# Patient Record
Sex: MALE | Race: BLACK OR AFRICAN AMERICAN | Employment: UNEMPLOYED | URBAN - METROPOLITAN AREA
[De-identification: names, ages, dates, MRNs, and addresses within clinical notes are randomized per-mention and may not be internally consistent; named-entity substitution may affect disease eponyms.]

---

## 2017-01-25 ENCOUNTER — OFFICE VISIT (OUTPATIENT)
Dept: CARDIOLOGY CLINIC | Age: 52
End: 2017-01-25

## 2017-01-25 VITALS
HEIGHT: 71 IN | DIASTOLIC BLOOD PRESSURE: 100 MMHG | SYSTOLIC BLOOD PRESSURE: 165 MMHG | WEIGHT: 173 LBS | HEART RATE: 100 BPM | BODY MASS INDEX: 24.22 KG/M2

## 2017-01-25 DIAGNOSIS — R06.02 SHORT OF BREATH ON EXERTION: Primary | ICD-10-CM

## 2017-01-25 DIAGNOSIS — M51.36 DDD (DEGENERATIVE DISC DISEASE), LUMBAR: ICD-10-CM

## 2017-01-25 DIAGNOSIS — I10 ESSENTIAL HYPERTENSION: ICD-10-CM

## 2017-01-25 DIAGNOSIS — D86.9 SARCOID: ICD-10-CM

## 2017-01-25 DIAGNOSIS — I42.9 CARDIOMYOPATHY (HCC): ICD-10-CM

## 2017-01-25 RX ORDER — LOSARTAN POTASSIUM 25 MG/1
TABLET ORAL DAILY
COMMUNITY
End: 2017-01-25 | Stop reason: SDUPTHER

## 2017-01-25 RX ORDER — LOSARTAN POTASSIUM 25 MG/1
25 TABLET ORAL DAILY
Qty: 30 TAB | Refills: 11 | Status: SHIPPED | OUTPATIENT
Start: 2017-01-25 | End: 2017-05-30 | Stop reason: SDUPTHER

## 2017-01-25 NOTE — LETTER
1/25/2017 11:52 PM 
Patient:  Mitchell Adams YOB: 1955 Date of Visit: 1/25/2017 Dear MD Dragan Jade [facility] [address] [facility] 7 [address] VIA In Basket Sandy Dye MD 
[address] [facility] [address] [facility] 7 [address] VIA Facsimile: [phone] 
 : 
Mr. Andres Gonzalez is a 62 yo M with a history of pulmonary sarcoid, alcohol abuse (quit in December 2015), bipolar, had an admission for abdominal pain and CHF in December 2015, found to have an EF of 10-15%; was felt to be alcohol related. Also had cholecystitis and a drain in place, later 3/2016 lap choly with Dr. Nick Sanchez. Echo 2/2016 noted EF improved to 40%. Echo 5/2016 EF had decreased to 20%. Cardiac cath 05/24/2016 noted no coronary artery disease. EF 9/2016 EF normalized to 55%. Sees PCP for herniated disc with sciatica. He had been doing okay, but the last three days he has had a headache. He does admit to having a lot of stressors and a sister that is missing. He has been short of breath at times when he is walking stairs. He denies any exertional chest pains, but will have occasional brief chest discomfort at rest.  He has continued to avoid tobacco and alcohol use. He did gain weight after being off alcohol and tobacco and Percocet over the holidays and we talked a little bit about this. He was on Lisinopril before, but had a dry mouth and this was stopped. He said there was some discussion about putting him on Clonidine when he sees his primary care physician. He is compensated on exam with clear lungs. Assessment and Plan: 1. Shortness of breath. Given his history of cardiomyopathy, will obtain an echocardiogram for further evaluation. He overall seems euvolemic and compensated here. His last echocardiogram noted an EF of 55%. Cardiac cath 5/2016 that noted no CAD. His EF had been as low as 25% in 5/2016. Will continue his beta-blocker. Instead of Clonidine, he may do better on ARB and gave him a prescription for Losartan 25 mg. He will see his primary care physician next week and discuss this further. Will tentatively have him follow up in four to five months if his echocardiogram is okay. 2. Essential hypertension. His blood pressure had been normal, but the last few days it has been higher. It is likely secondary to his headaches. Adding Losartan 25 mg as noted above. 3. Chest pains. Noncardiac. Possibly musculoskeletal.  Cardiac catheterization 2016 noted no CAD. 4. Chronic back pains. Followed by Dr. Carrol Rosales. 5. Pulmonary sarcoid. Followed by [facility]. 6. History of alcohol abuse and tobacco use. Quit in 12/2015 and 03/2016. If you have questions, please do not hesitate to call me. Sincerely, Parveen Sinclair MD

## 2017-01-25 NOTE — PROGRESS NOTES
[place]: Vicki Rodriguez  [phone]    History of Present Illness:   Mr. Rowena Heredia is a 64 yo M with a history of pulmonary sarcoid, alcohol abuse (quit in December 2015), bipolar, had an admission for abdominal pain and CHF in December 2015, found to have an EF of 10-15%; was felt to be alcohol related. Also had cholecystitis and a drain in place, later 3/2016 lap choly with Dr. Shawna Avitia. Echo 2/2016 noted EF improved to 40%. Echo 5/2016 EF had decreased to 20%. Cardiac cath 05/24/2016 noted no coronary artery disease. EF 9/2016 EF normalized to 55%. Sees PCP for herniated disc with sciatica. He had been doing okay, but the last three days he has had a headache. He does admit to having a lot of stressors and a sister that is missing. He has been short of breath at times when he is walking stairs. He denies any exertional chest pains, but will have occasional brief chest discomfort at rest.  He has continued to avoid tobacco and alcohol use. He did gain weight after being off alcohol and tobacco and Percocet over the holidays and we talked a little bit about this. He was on Lisinopril before, but had a dry mouth and this was stopped. He said there was some discussion about putting him on Clonidine when he sees his primary care physician. He is compensated on exam with clear lungs and no lower extremity edema. Assessment and Plan:   1. Shortness of breath. Given his history of cardiomyopathy, will obtain an echocardiogram for further evaluation. He overall seems euvolemic and compensated here. His last echocardiogram noted an EF of 55%. Cardiac cath 5/2016 that noted no CAD. His EF had been as low as 25% in 5/2016. Will continue his beta-blocker. Instead of Clonidine, he may do better on ARB and gave him a prescription for Losartan 25 mg. He will see his primary care physician next week and discuss this further.   Will tentatively have him follow up in four to five months if his echocardiogram is okay. 2. Headache. It appears like this may be a tension headache. Recommend he take prn Tylenol. 3. Essential hypertension. His blood pressure had been normal, but the last few days it has been higher. It is likely secondary to his headaches. Adding Losartan 25 mg as noted above. 4. Chest pains. Noncardiac. Possibly musculoskeletal.  Cardiac catheterization 2016 noted no CAD. 5. Chronic back pains. Followed by Dr. Matt Ferrer. 6. Pulmonary sarcoid. Followed by [facility]. 7. History of alcohol abuse and tobacco use. Quit in 12/2015 and 03/2016.   8. Bipolar. He  has a past medical history of Asthma; Bipolar disorder ([address] Cataracts, bilateral; DDD (degenerative disc disease); Fracture of left humerus; Glaucoma; Heart failure ([address] Hypertension; Ill-defined condition (2000); Ill-defined condition; Ill-defined condition (2016); Other ill-defined conditions(799.89); Sarcoid ([address] and Sarcoidosis ([facility] [address] All other systems negative except as above. PE  Vitals:    01/25/17 1529   BP: (!) 165/100   Pulse: 100   Weight: 173 lb (78.5 kg)   Height: 5' 11\" (1.803 m)    Body mass index is 24.13 kg/(m^2).    General appearance - alert, well appearing, and in no distress  Mental status - affect appropriate to mood  Eyes - sclera anicteric, moist mucous membranes  Neck - supple, no JVD  Chest - clear to auscultation, no wheezes, rales or rhonchi  Heart - normal rate, regular rhythm, normal S1, S2, no murmurs, rubs, clicks or gallops  Abdomen - soft, nontender, nondistended, no masses or organomegaly  Extremities - peripheral pulses normal, no pedal edema      Recent Labs:  Lab Results   Component Value Date/Time    Cholesterol, total 143 12/30/2015 03:24 AM    HDL Cholesterol 84 12/30/2015 03:24 AM    LDL, calculated 45.8 12/30/2015 03:24 AM    Triglyceride 66 12/30/2015 03:24 AM    CHOL/HDL Ratio 1.7 12/30/2015 03:24 AM     Lab Results   Component Value Date/Time    Creatinine (POC) 1.1 12/28/2015 07:03 PM    Creatinine 1.22 06/07/2016 04:00 AM     Lab Results   Component Value Date/Time    BUN 17 06/07/2016 04:00 AM    BUN (POC) 14 12/28/2015 07:03 PM     Lab Results   Component Value Date/Time    Potassium 4.2 06/07/2016 04:00 AM     No results found for: HBA1C, HGBE8, WDI5VTPI, YUF2QDDH  Lab Results   Component Value Date/Time    Hemoglobin (POC) 18.7 12/28/2015 07:03 PM    HGB 15.3 06/07/2016 04:00 AM     Lab Results   Component Value Date/Time    PLATELET 755 82/33/2368 04:00 AM       Reviewed:  Past Medical History   Diagnosis Date    Asthma     Bipolar disorder (HCC)      Bi-polar, Anxiety    Cataracts, bilateral     DDD (degenerative disc disease)     Fracture of left humerus      non operative    Glaucoma     Heart failure ([facility]      Degenerated heart failure    Hypertension     Ill-defined condition 2000     DX WITH SARCODOSIS    Ill-defined condition      USES O2 AT 3;30AM TO 5 AM EVERDAY & AS NEEDED    Ill-defined condition 2016     HEP C    Other ill-defined conditions(799.89)      Glaucoma    Sarcoid ([facility]     Sarcoidosis ([facility] [address] [facility]      lung      History   Smoking Status    Former Smoker    Packs/day: 0.25    Years: 6.00    Quit date: 2/6/2016   Smokeless Tobacco    Never Used     History   Alcohol Use No     Allergies   Allergen Reactions    Pcn [Penicillins] Anaphylaxis    Shellfish Derived Anaphylaxis    Iodine Hives and Swelling       Current Outpatient Prescriptions   Medication Sig    predniSONE (DELTASONE) 20 mg tablet Take 1 Tab by mouth daily (with breakfast). Indications: sarcoidosis    carvedilol (COREG) 25 mg tablet Take 1 Tab by mouth two (2) times daily (with meals). Indications: CHRONIC HEART FAILURE, HYPERTENSION    furosemide (LASIX) 40 mg tablet Take 1 Tab by mouth daily.  potassium chloride SR 20 mEq TbER Take 20 mEq by mouth daily.  lithium carbonate 150 mg capsule Take 150 mg by mouth two (2) times a day.     prednisoLONE acetate (PRED FORTE) 1 % ophthalmic suspension Administer 1 Drop to both eyes four (4) times daily. Indications: CATARACTS; SARCOIDOSIS    aspirin delayed-release 325 mg tablet Take 325 mg by mouth daily.  lidocaine (LIDODERM) 5 % 1 Patch by TransDERmal route daily as needed for Pain. Apply patch to the affected area for 12 hours a day and remove for 12 hours a day.  nitroglycerin (NITROSTAT) 0.4 mg SL tablet 1 Tab by SubLINGual route every five (5) minutes as needed for Chest Pain (Max dose of 3).  ALPRAZolam (XANAX) 1 mg tablet Take 1 mg by mouth nightly. No current facility-administered medications for this visit.         Edward Jeffers MD  [facility] [place]  [address] [facility] [address]  [address]

## 2017-01-25 NOTE — MR AVS SNAPSHOT
Visit Information Date & Time Provider Department Dept. Phone Encounter #  
 1/25/2017  2:00 PM Hayde Lopez MD [facility] [phone] [id number] Your Appointments 2/6/2017  3:00 PM  
ECHO CARDIOGRAMS 2D with Henry Walker [facility] (HOLLY SCHEDULING) Appt Note: Per Dr. Elena Portillo dx SOB  
 [address] [facility] [address] [facility] [address] [facility] [address]  
[address] [facility] [address] [address]  
  
    
 2/24/2017  8:30 AM  
Any with Sujatha Galvez MD  
[address] [facility] [address]) Appt Note: follow up 3mnths; 3 month follow up  
 [address] [facility]  
  
   
 [phone] [id number] 5/24/2017  2:00 PM  
ESTABLISHED PATIENT with Hayde Lopez MD  
[facility] ([address]) Appt Note: 4-5 month follow up  
 [address]  
[address] [address] Upcoming Health Maintenance Date Due Hepatitis C Screening 3/9/1955 Pneumococcal 19-64 Medium Risk (1 of 1 - PPSV23) 3/9/1976 FOBT Q 1 YEAR AGE 50-75 3/9/2007 DTaP/Tdap/Td series (1 - Tdap) 12/18/2009 INFLUENZA AGE 9 TO ADULT 8/1/2016 Allergies as of 1/25/2017  Review Complete On: 1/25/2017 By: Cristina Villa RN Severity Noted Reaction Type Reactions Pcn [Penicillins] High 01/18/2016    Anaphylaxis Shellfish Derived High 03/01/2016    Anaphylaxis Iodine  04/28/2010    Hives, Swelling Current Immunizations  Reviewed on 1/3/2016 Name Date Influenza Vaccine Whole 11/28/2009 TD Vaccine 12/17/2009 Not reviewed this visit You Were Diagnosed With   
  
 Codes Comments Systolic congestive heart failure, unspecified congestive heart failure chronicity ([facility] [address]    -  Primary ICD-10-CM: I50.20 ICD-9-CM: 428.20, 428.0 Essential hypertension     ICD-10-CM: I10 
ICD-9-CM: 401.9 Essential hypertension with goal blood pressure less than 140/90     ICD-10-CM: I10 
ICD-9-CM: 401.9 Vitals BP Pulse Height(growth percentile) Weight(growth percentile) BMI Smoking Status (!) 165/100 (BP 1 Location: Right arm, BP Patient Position: Sitting) 100 5' 11\" (1.803 m) 173 lb (78.5 kg) 24.13 kg/m2 Former Smoker Vitals History BMI and BSA Data Body Mass Index Body Surface Area  
 24.13 kg/m 2 1.98 m 2 Preferred Pharmacy Pharmacy Name Phone [address], [address] [phone] Your Updated Medication List  
  
   
This list is accurate as of: 1/25/17  3:54 PM.  Always use your most recent med list.  
  
  
  
  
 ALPRAZolam 1 mg tablet Commonly known as:  Ivan January Take 1 mg by mouth nightly. aspirin delayed-release 325 mg tablet Take 325 mg by mouth daily. carvedilol 25 mg tablet Commonly known as:  Chilo Marshal Take 1 Tab by mouth two (2) times daily (with meals). Indications: CHRONIC HEART FAILURE, HYPERTENSION  
  
 furosemide 40 mg tablet Commonly known as:  LASIX Take 1 Tab by mouth daily. lidocaine 5 % Commonly known as:  LIDODERM  
1 Patch by TransDERmal route daily as needed for Pain. Apply patch to the affected area for 12 hours a day and remove for 12 hours a day. lithium carbonate 150 mg capsule Take 150 mg by mouth two (2) times a day. losartan 25 mg tablet Commonly known as:  COZAAR Take 1 Tab by mouth daily. nitroglycerin 0.4 mg SL tablet Commonly known as:  NITROSTAT  
1 Tab by SubLINGual route every five (5) minutes as needed for Chest Pain (Max dose of 3). potassium chloride SR 20 mEq tablet Commonly known as:  K-TAB Take 20 mEq by mouth daily. prednisoLONE acetate 1 % ophthalmic suspension Commonly known as:  PRED FORTE Administer 1 Drop to both eyes four (4) times daily. Indications: CATARACTS; SARCOIDOSIS  
  
 predniSONE 20 mg tablet Commonly known as:  Shade Ok Take 1 Tab by mouth daily (with breakfast). Indications: sarcoidosis Prescriptions Printed Refills  
 losartan (COZAAR) 25 mg tablet 11 Sig: Take 1 Tab by mouth daily. Class: Print Route: Oral  
  
We Performed the Following AMB POC EKG ROUTINE W/ 12 LEADS, INTER & REP [78236 CPT(R)] Introducing [facility]! Jasmine Asif introduces [employer] patient portal. Now you can access parts of your medical record, email your doctor's office, and request medication refills online. 1. In your internet browser, go to [email or web address] 2. Click on the First Time User? Click Here link in the Sign In box. You will see the New Member Sign Up page. 3. Enter your [employer] Access Code exactly as it appears below. You will not need to use this code after youve completed the sign-up process. If you do not sign up before the expiration date, you must request a new code. · [employer] Access Code: [email or web address] Expires: 4/25/2017  3:24 PM 
 
4. Enter the last four digits of your Social Security Number (xxxx) and Date of Birth (mm/dd/yyyy) as indicated and click Submit. You will be taken to the next sign-up page. 5. Create a [employer] ID. This will be your [employer] login ID and cannot be changed, so think of one that is secure and easy to remember. 6. Create a [employer] password. You can change your password at any time. 7. Enter your Password Reset Question and Answer. This can be used at a later time if you forget your password. 8. Enter your e-mail address. You will receive e-mail notification when new information is available in [address] 9. Click Sign Up. You can now view and download portions of your medical record.  
10. Click the Download Summary menu link to download a portable copy of your medical information. If you have questions, please visit the Frequently Asked Questions section of the [employer] website. Remember, [employer] is NOT to be used for urgent needs. For medical emergencies, dial 911. Now available from your iPhone and Android! Please provide this summary of care documentation to your next provider. Your primary care clinician is listed as Lorenzo Mtz. If you have any questions after today's visit, please call [phone].

## 2017-02-06 ENCOUNTER — CLINICAL SUPPORT (OUTPATIENT)
Dept: CARDIOLOGY CLINIC | Age: 52
End: 2017-02-06

## 2017-02-06 DIAGNOSIS — D86.9 SARCOID: ICD-10-CM

## 2017-02-06 DIAGNOSIS — I42.9 CARDIOMYOPATHY (HCC): ICD-10-CM

## 2017-02-06 DIAGNOSIS — R06.02 SHORT OF BREATH ON EXERTION: ICD-10-CM

## 2017-02-06 DIAGNOSIS — I10 ESSENTIAL HYPERTENSION: ICD-10-CM

## 2017-02-06 DIAGNOSIS — M51.36 DDD (DEGENERATIVE DISC DISEASE), LUMBAR: ICD-10-CM

## 2017-02-08 ENCOUNTER — TELEPHONE (OUTPATIENT)
Dept: CARDIOLOGY CLINIC | Age: 52
End: 2017-02-08

## 2017-02-08 NOTE — TELEPHONE ENCOUNTER
----- Message from Roverto Rodriguez MD sent at 2/7/2017  2:37 PM EST -----  Please let pt know echo was normal, if shortness of breath persists, would do treadmill nuclear stress test.  F/u in 2-3 months if not already scheduled. thx      Above echo results given. Scheduled patient for a treadmill nuc. Pre-testing instructions mailed to patient.  2 pt identifiers used      Future Appointments  Date Time Provider [facility] [place]   2/20/2017 1:00 PM [place] [address] Karena [facility]   2/24/2017 8:30 AM Shelia Gibson  [address]   5/24/2017 2:00 PM Roverto Rodriguez  [address]

## 2017-02-20 ENCOUNTER — CLINICAL SUPPORT (OUTPATIENT)
Dept: CARDIOLOGY CLINIC | Age: 52
End: 2017-02-20

## 2017-02-20 DIAGNOSIS — I42.8 NON-ISCHEMIC CARDIOMYOPATHY (HCC): ICD-10-CM

## 2017-02-20 DIAGNOSIS — R06.02 SHORTNESS OF BREATH: ICD-10-CM

## 2017-02-21 ENCOUNTER — TELEPHONE (OUTPATIENT)
Dept: CARDIOLOGY CLINIC | Age: 52
End: 2017-02-21

## 2017-02-21 NOTE — TELEPHONE ENCOUNTER
----- Message from Talia Holden MD sent at 2/21/2017  2:11 PM EST -----  Please let pt know stress test was normal.  Symptoms non cardiac. Consider pulmonary evaluation if still short of breath. thx      Above stress test results given to patient. 2 pt identifiers used  He will f/u with his PCP.

## 2017-02-27 ENCOUNTER — OFFICE VISIT (OUTPATIENT)
Dept: INTERNAL MEDICINE CLINIC | Age: 52
End: 2017-02-27

## 2017-02-27 VITALS
WEIGHT: 178 LBS | TEMPERATURE: 97.9 F | SYSTOLIC BLOOD PRESSURE: 149 MMHG | RESPIRATION RATE: 18 BRPM | BODY MASS INDEX: 24.83 KG/M2 | OXYGEN SATURATION: 94 % | DIASTOLIC BLOOD PRESSURE: 106 MMHG | HEART RATE: 86 BPM

## 2017-02-27 DIAGNOSIS — I25.2 HX OF MYOCARDIAL INFARCTION: ICD-10-CM

## 2017-02-27 DIAGNOSIS — M54.41 CHRONIC LOW BACK PAIN WITH RIGHT-SIDED SCIATICA, UNSPECIFIED BACK PAIN LATERALITY: ICD-10-CM

## 2017-02-27 DIAGNOSIS — R06.09 DOE (DYSPNEA ON EXERTION): Primary | ICD-10-CM

## 2017-02-27 DIAGNOSIS — R09.02 HYPOXIA: ICD-10-CM

## 2017-02-27 DIAGNOSIS — G89.29 CHRONIC LOW BACK PAIN WITH RIGHT-SIDED SCIATICA, UNSPECIFIED BACK PAIN LATERALITY: ICD-10-CM

## 2017-02-27 RX ORDER — METHYLPREDNISOLONE 4 MG/1
TABLET ORAL
Qty: 1 DOSE PACK | Refills: 0 | Status: SHIPPED | OUTPATIENT
Start: 2017-02-27 | End: 2017-08-17 | Stop reason: ALTCHOICE

## 2017-02-27 RX ORDER — TRAMADOL HYDROCHLORIDE 50 MG/1
50 TABLET ORAL
Qty: 30 TAB | Refills: 0 | Status: SHIPPED | OUTPATIENT
Start: 2017-02-27 | End: 2017-08-17 | Stop reason: SDUPTHER

## 2017-02-27 NOTE — PROGRESS NOTES
Mr. Nadira Ivory is a 63y.o. year old male who had concerns including Back Pain and Breathing Problem. HPI:  Chief Complaint   Patient presents with    Back Pain     chronic back pain    Breathing Problem     shortness of breath x3 weeks, stress test done on 2/20/17, ECHO done per patient normal. Followed by Dr Pan Saab. Ambulatory oxygen 88%. Past Medical History:   Diagnosis Date    Asthma     Bipolar disorder (HCC)     Bi-polar, Anxiety    Cataracts, bilateral     DDD (degenerative disc disease)     Fracture of left humerus     non operative    Glaucoma     Heart failure (HCC)     Degenerated heart failure    Hypertension     Ill-defined condition 2000    DX WITH SARCODOSIS    Ill-defined condition     USES O2 AT 3;30AM TO 5 AM EVERDAY & AS NEEDED    Ill-defined condition 2016    HEP C    Other ill-defined conditions(799.89)     Glaucoma    Sarcoid ([address]     Sarcoidosis (HCC)     lung      Current Outpatient Prescriptions   Medication Sig Dispense    methylPREDNISolone (MEDROL DOSEPACK) 4 mg tablet Per pack instruction 1 Dose Pack    traMADol (ULTRAM) 50 mg tablet Take 1 Tab by mouth every eight (8) hours as needed for Pain. Max Daily Amount: 150 mg. 30 Tab    losartan (COZAAR) 25 mg tablet Take 1 Tab by mouth daily. 30 Tab    predniSONE (DELTASONE) 20 mg tablet Take 1 Tab by mouth daily (with breakfast). Indications: sarcoidosis 100 Tab    carvedilol (COREG) 25 mg tablet Take 1 Tab by mouth two (2) times daily (with meals). Indications: CHRONIC HEART FAILURE, HYPERTENSION 60 Tab    furosemide (LASIX) 40 mg tablet Take 1 Tab by mouth daily. 30 Tab    potassium chloride SR 20 mEq TbER Take 20 mEq by mouth daily. 30 Tab    lithium carbonate 150 mg capsule Take 150 mg by mouth two (2) times a day.  prednisoLONE acetate (PRED FORTE) 1 % ophthalmic suspension Administer 1 Drop to both eyes four (4) times daily.  Indications: CATARACTS; SARCOIDOSIS     aspirin delayed-release 325 mg tablet Take 325 mg by mouth daily.  lidocaine (LIDODERM) 5 % 1 Patch by TransDERmal route daily as needed for Pain. Apply patch to the affected area for 12 hours a day and remove for 12 hours a day.  nitroglycerin (NITROSTAT) 0.4 mg SL tablet 1 Tab by SubLINGual route every five (5) minutes as needed for Chest Pain (Max dose of 3). 1 Bottle    ALPRAZolam (XANAX) 1 mg tablet Take 1 mg by mouth nightly. No current facility-administered medications for this visit. Reviewed PmHx, RxHx, FmHx, SocHx, AllgHx and updated and dated in the chart. ROS: Negative except for BOLD  General: fever, chills, fatigue  Respiratory: cough, SOB, wheezing  Cardiovascular:  CP, palpitation, LAW, edema   Gastrointestinal: N/V/D, bleeding  Genito-Urinary: dysuria, hematuria  Musculoskeletal: muscle weakness, pain, swelling    OBJECTIVE:   Visit Vitals    BP (!) 149/106    Pulse 86    Temp 97.9 °F (36.6 °C)    Resp 18    Wt 178 lb (80.7 kg)    SpO2 94%    BMI 24.83 kg/m2     GEN: The patient appears well, alert, oriented x 3, in reports pain distress. ENT: bilateral TM and canal normal.  Neck supple. No adenopathy or thyromegaly. HE. Lungs: clear bilaterally, good air entry, no wheezes, rhonchi or rales. Cardiovascular: regular rate and rhythm. S1 and S2 normal, no murmurs,  Abdomen: + BS, soft without tenderness, guarding, rebound, mass or organomegaly. Extremities: no edema, normal peripheral pulses. Neurological: normal, gross sensory and motor in tact without focal findings. Assessment/ Plan:       ICD-10-CM ICD-9-CM    1. LAW (dyspnea on exertion) R06.09 786.09 REFERRAL TO PULMONARY DISEASE   Peristent, cleared by cardiology wit neg stress test.   methylPREDNISolone (MEDROL DOSEPACK) 4 mg tablet   2. Chronic low back pain with right-sided sciatica, unspecified back pain laterality M54.41 724.2     G89.29 724.3      338.29    3.  Hx of myocardial infarction I25.2 412 REFERRAL TO PULMONARY DISEASE      methylPREDNISolone (MEDROL DOSEPACK) 4 mg tablet   4. Hypoxia R09.02 799.02 Qualifies for oxygen. Refer pulmonary start oxgen        reviewed with no sign of misuse. I have discussed the diagnosis with the patient and the intended plan as seen in the above orders. The patient has received an after-visit summary and questions were answered concerning future plans. Medication Side Effects and Warnings were discussed with patient.     Follow-up Disposition:  Return in about 1 month (around 3/27/2017) for Follow up- Chronic Care in Place Pulmonary Sandy Hernandez MD

## 2017-02-27 NOTE — PROGRESS NOTES
Chief Complaint   Patient presents with    Back Pain     chronic back pain    Breathing Problem     shortness of breath x3 weeks, stress test done on 2/20/17   Pt states he is always short of breath for 3 weeks straight. Back pain is constant and at a scale of 10.    Pt's Ambulatory O2 Sat 88%

## 2017-05-01 ENCOUNTER — TELEPHONE (OUTPATIENT)
Dept: INTERNAL MEDICINE CLINIC | Age: 52
End: 2017-05-01

## 2017-05-01 NOTE — TELEPHONE ENCOUNTER
Juarez (pt) called in requesting Rx refill for:lisinopril (PRINIVIL, ZESTRIL) 20 mg tablet. Please send to [address].  Pt contact # [phone]

## 2017-05-01 NOTE — TELEPHONE ENCOUNTER
Received Message stating Patient called requesting refill for Lisinopril. In patient chart lisinopril is not listed.

## 2017-05-03 RX ORDER — LISINOPRIL 5 MG/1
TABLET ORAL
Qty: 30 TAB | Refills: 10 | Status: SHIPPED | OUTPATIENT
Start: 2017-05-03 | End: 2017-05-30 | Stop reason: ALTCHOICE

## 2017-05-30 ENCOUNTER — OFFICE VISIT (OUTPATIENT)
Dept: INTERNAL MEDICINE CLINIC | Age: 52
End: 2017-05-30

## 2017-05-30 VITALS
HEART RATE: 98 BPM | WEIGHT: 174 LBS | SYSTOLIC BLOOD PRESSURE: 146 MMHG | DIASTOLIC BLOOD PRESSURE: 89 MMHG | RESPIRATION RATE: 14 BRPM | OXYGEN SATURATION: 91 % | HEIGHT: 71 IN | BODY MASS INDEX: 24.36 KG/M2 | TEMPERATURE: 98.1 F

## 2017-05-30 DIAGNOSIS — I50.20 SYSTOLIC CONGESTIVE HEART FAILURE, UNSPECIFIED CONGESTIVE HEART FAILURE CHRONICITY: ICD-10-CM

## 2017-05-30 DIAGNOSIS — I10 ESSENTIAL HYPERTENSION: Primary | ICD-10-CM

## 2017-05-30 DIAGNOSIS — I10 ESSENTIAL HYPERTENSION WITH GOAL BLOOD PRESSURE LESS THAN 140/90: ICD-10-CM

## 2017-05-30 DIAGNOSIS — R21 RASH: ICD-10-CM

## 2017-05-30 DIAGNOSIS — D86.9 SARCOID: ICD-10-CM

## 2017-05-30 DIAGNOSIS — F31.70 BIPOLAR DISORDER IN FULL REMISSION, MOST RECENT EPISODE UNSPECIFIED TYPE (HCC): ICD-10-CM

## 2017-05-30 DIAGNOSIS — Z11.59 NEED FOR HEPATITIS C SCREENING TEST: ICD-10-CM

## 2017-05-30 DIAGNOSIS — Z12.11 SCREEN FOR COLON CANCER: ICD-10-CM

## 2017-05-30 RX ORDER — CARVEDILOL 25 MG/1
25 TABLET ORAL 2 TIMES DAILY WITH MEALS
Qty: 120 TAB | Refills: 6 | Status: SHIPPED | OUTPATIENT
Start: 2017-05-30 | End: 2017-06-07 | Stop reason: SDUPTHER

## 2017-05-30 RX ORDER — KETOCONAZOLE 20 MG/G
CREAM TOPICAL DAILY
Qty: 30 G | Refills: 4 | Status: SHIPPED | OUTPATIENT
Start: 2017-05-30 | End: 2017-08-17 | Stop reason: SDUPTHER

## 2017-05-30 RX ORDER — LIDOCAINE 50 MG/G
1 PATCH TOPICAL
Qty: 1 PACKAGE | Refills: 8 | Status: SHIPPED | OUTPATIENT
Start: 2017-05-30 | End: 2017-07-26 | Stop reason: SDUPTHER

## 2017-05-30 RX ORDER — LOSARTAN POTASSIUM 25 MG/1
25 TABLET ORAL DAILY
Qty: 90 TAB | Refills: 3 | Status: SHIPPED | OUTPATIENT
Start: 2017-05-30 | End: 2017-07-26 | Stop reason: SDUPTHER

## 2017-05-30 NOTE — PROGRESS NOTES
Mr. Nael Blanca is a 63y.o. year old male who had concerns including Hypertension; Pain (Chronic); and Rash. HPI:  Chief Complaint   Patient presents with    Hypertension     3 month follow up    Pain (Chronic)     3 month follow up, was seeing Dr. Lane Friend but she moved. Has not found anyone yet because \"no one takes his insurance\".  Rash     Intermittent, on left side of neck. Itching. Has changed soaps/detergents/lotions with no resolution. BP Readings from Last 3 Encounters:   05/30/17 146/89   02/27/17 (!) 149/106   01/25/17 (!) 165/100     Pulmonary_ July 5, next appt. Using oxygen intermittently. 'legs feel weak after walking a few blocks, I have to sit down, and I feel a little light headed'  Gets better after sitting down. He does fine when he is on oxygen. Past Medical History:   Diagnosis Date    Asthma     Bipolar disorder (HCC)     Bi-polar, Anxiety    Cataracts, bilateral     DDD (degenerative disc disease)     Fracture of left humerus     non operative    Glaucoma     Heart failure (HCC)     Degenerated heart failure    Hypertension     Ill-defined condition 2000    DX WITH SARCODOSIS    Ill-defined condition     USES O2 AT 3;30AM TO 5 AM EVERDAY & AS NEEDED    Ill-defined condition 2016    HEP C    Other ill-defined conditions     Glaucoma    Sarcoid ([facility]     Sarcoidosis ([facility]     lung      Current Outpatient Prescriptions   Medication Sig Dispense    carvedilol (COREG) 25 mg tablet Take 1 Tab by mouth two (2) times daily (with meals). Indications: Chronic Heart Failure, hypertension 120 Tab    losartan (COZAAR) 25 mg tablet Take 1 Tab by mouth daily. 90 Tab    lidocaine (LIDODERM) 5 % 1 Patch by TransDERmal route daily as needed for Pain. Apply patch to the affected area for 12 hours a day and remove for 12 hours a day. 1 Package    ketoconazole (NIZORAL) 2 % topical cream Apply  to affected area daily.  Rash on neck, continue for 2 weeks after rash resolves 30 g    methylPREDNISolone (MEDROL DOSEPACK) 4 mg tablet Per pack instruction 1 Dose Pack    traMADol (ULTRAM) 50 mg tablet Take 1 Tab by mouth every eight (8) hours as needed for Pain. Max Daily Amount: 150 mg. 30 Tab    predniSONE (DELTASONE) 20 mg tablet Take 1 Tab by mouth daily (with breakfast). Indications: sarcoidosis 100 Tab    potassium chloride SR 20 mEq TbER Take 20 mEq by mouth daily. 30 Tab    lithium carbonate 150 mg capsule Take 150 mg by mouth two (2) times a day.  prednisoLONE acetate (PRED FORTE) 1 % ophthalmic suspension Administer 1 Drop to both eyes four (4) times daily. Indications: CATARACTS; SARCOIDOSIS     aspirin delayed-release 325 mg tablet Take 325 mg by mouth daily.  nitroglycerin (NITROSTAT) 0.4 mg SL tablet 1 Tab by SubLINGual route every five (5) minutes as needed for Chest Pain (Max dose of 3). 1 Bottle    ALPRAZolam (XANAX) 1 mg tablet Take 1 mg by mouth nightly. No current facility-administered medications for this visit. Reviewed PmHx, RxHx, FmHx, SocHx, AllgHx and updated and dated in the chart. ROS: Negative except for BOLD  General: fever, chills, fatigue  Respiratory: cough, SOB, wheezing  Cardiovascular:  CP, palpitation, LAW, edema   Gastrointestinal: N/V/D, bleeding  Genito-Urinary: dysuria, hematuria  Musculoskeletal: muscle weakness, pain, swelling    OBJECTIVE:   Visit Vitals    /89 (BP 1 Location: Left arm, BP Patient Position: Sitting)    Pulse 98    Temp 98.1 °F (36.7 °C) (Oral)    Resp 14    Ht 5' 11\" (1.803 m)    Wt 174 lb (78.9 kg)    SpO2 91%    BMI 24.27 kg/m2     GEN: The patient appears well, alert, oriented x 3, in no distress. Lungs: clear bilaterally, fiar air entry, no wheezes, rhonchi or rales. Cardiovascular: regular rate and rhythm. S1 and S2 normal, no murmurs,  Abdomen: + BS, soft without tenderness, guarding, rebound, mass or organomegaly. Extremities: no edema, normal peripheral pulses.    Rash: hypopigmented macules on left side of neck. No scaling or pustules. Assessment/ Plan:       ICD-10-CM ICD-9-CM    1. Essential hypertension I10 401.9 carvedilol (COREG) 25 mg tablet      losartan (COZAAR) 25 mg tablet   2. Essential hypertension with goal blood pressure less than 140/90 I10 401.9 carvedilol (COREG) 25 mg tablet      METABOLIC PANEL, BASIC   3. Sarcoid ([facility] D86.9 135    4. Systolic congestive heart failure, unspecified congestive heart failure chronicity (HCC) I50.20 428.20 carvedilol (COREG) 25 mg tablet     428.0 losartan (COZAAR) 25 mg tablet   5. Need for hepatitis C screening test Z11.59 V73.89 HEPATITIS C AB   6. Screen for colon cancer Z12.11 V76.51 OCCULT BLOOD, IMMUNOASSAY (FIT)   7. Bipolar disorder in full remission, most recent episode unspecified type ([email or web address] F31.70 296.80 LITHIUM   8. Rash R21 782.1 ketoconazole (NIZORAL) 2 % topical cream  ?tinea versicolor. Non infectious. Medication compliance? HTN. Goal BP < 140   Keep follow up appointment with pulmonary. Daytime oxygen needed given oxygen <92%, sx given hypoxia. Discussed risk. Pt encouraged to call [facility] for portable oxygen options. 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 The patient has received an after-visit summary and questions were answered concerning future plans. Medication Side Effects and Warnings were discussed with patient. Follow-up Disposition:  Return in about 4 weeks (around 6/27/2017) for annual exam, recheck bp.       Anuel Erazo MD

## 2017-05-30 NOTE — MR AVS SNAPSHOT
Visit Information Date & Time Provider Department Dept. Phone Encounter #  
 5/30/2017  8:30 AM Luz Farley MD [facility] and [address] [place] [address] [phone] [id number] Follow-up Instructions Return in about 4 weeks (around 6/27/2017) for annual exam, recheck bp. Your Appointments 6/2/2017  9:20 AM  
ESTABLISHED PATIENT with Kervin Shaw MD  
[facility] ([address]) Appt Note: 4-5 month follow up; 4-5 month follow up pt r/s from 5/24 to [facility] [address]  
[address] [facility] 7 [address] Upcoming Health Maintenance Date Due Hepatitis C Screening 3/9/1955 Pneumococcal 19-64 Medium Risk (1 of 1 - PPSV23) 3/9/1976 FOBT Q 1 YEAR AGE 50-75 3/9/2007 ZOSTER VACCINE AGE 60> 3/9/2017 DTaP/Tdap/Td series (1 - Tdap) 7/28/2017* INFLUENZA AGE 9 TO ADULT 8/1/2017 *Topic was postponed. The date shown is not the original due date. Allergies as of 5/30/2017  Review Complete On: 5/30/2017 By: Dave Veliz Severity Noted Reaction Type Reactions Pcn [Penicillins] High 01/18/2016    Anaphylaxis Shellfish Derived High 03/01/2016    Anaphylaxis Iodine  04/28/2010    Hives, Swelling Current Immunizations  Reviewed on 1/3/2016 Name Date Influenza Vaccine Whole 11/28/2009 TD Vaccine 12/17/2009 Not reviewed this visit You Were Diagnosed With   
  
 Codes Comments Essential hypertension    -  Primary ICD-10-CM: I10 
ICD-9-CM: 401.9 Essential hypertension with goal blood pressure less than 140/90     ICD-10-CM: I10 
ICD-9-CM: 401.9 Sarcoid ([address]     ICD-10-CM: D86.9 ICD-9-CM: 135 Systolic congestive heart failure, unspecified congestive heart failure chronicity (HCC)     ICD-10-CM: I50.20 ICD-9-CM: 428.20, 428.0  Need for hepatitis C screening test     ICD-10-CM: Z11.59 
 ICD-9-CM: V73.89 Screen for colon cancer     ICD-10-CM: Z12.11 ICD-9-CM: V76.51 Bipolar disorder in full remission, most recent episode unspecified type ([facility]     ICD-10-CM: F31.70 ICD-9-CM: 296.80 Vitals BP Pulse Temp Resp Height(growth percentile) Weight(growth percentile) 146/89 (BP 1 Location: Left arm, BP Patient Position: Sitting) 98 98.1 °F (36.7 °C) (Oral) 14 5' 11\" (1.803 m) 174 lb (78.9 kg) SpO2 BMI Smoking Status 91% 24.27 kg/m2 Former Smoker Vitals History BMI and BSA Data Body Mass Index Body Surface Area  
 24.27 kg/m 2 1.99 m 2 Preferred Pharmacy Pharmacy Name Phone [address] [phone] Your Updated Medication List  
  
   
This list is accurate as of: 5/30/17  9:41 AM.  Always use your most recent med list.  
  
  
  
  
 ALPRAZolam 1 mg tablet Commonly known as:  Chris Oh Take 1 mg by mouth nightly. aspirin delayed-release 325 mg tablet Take 325 mg by mouth daily. carvedilol 25 mg tablet Commonly known as:  Urban Ирина Take 1 Tab by mouth two (2) times daily (with meals). Indications: Chronic Heart Failure, hypertension  
  
 lidocaine 5 % Commonly known as:  LIDODERM  
1 Patch by TransDERmal route daily as needed for Pain. Apply patch to the affected area for 12 hours a day and remove for 12 hours a day. lithium carbonate 150 mg capsule Take 150 mg by mouth two (2) times a day. losartan 25 mg tablet Commonly known as:  COZAAR Take 1 Tab by mouth daily. methylPREDNISolone 4 mg tablet Commonly known as:  Jacquelyn Angie Per pack instruction  
  
 nitroglycerin 0.4 mg SL tablet Commonly known as:  NITROSTAT  
1 Tab by SubLINGual route every five (5) minutes as needed for Chest Pain (Max dose of 3). potassium chloride SR 20 mEq tablet Commonly known as:  K-TAB Take 20 mEq by mouth daily. prednisoLONE acetate 1 % ophthalmic suspension Commonly known as:  PRED FORTE Administer 1 Drop to both eyes four (4) times daily. Indications: CATARACTS; SARCOIDOSIS  
  
 predniSONE 20 mg tablet Commonly known as:  Earlie Reveal Take 1 Tab by mouth daily (with breakfast). Indications: sarcoidosis  
  
 traMADol 50 mg tablet Commonly known as:  ULTRAM  
Take 1 Tab by mouth every eight (8) hours as needed for Pain. Max Daily Amount: 150 mg.  
  
  
  
  
Prescriptions Sent to Pharmacy Refills  
 carvedilol (COREG) 25 mg tablet 6 Sig: Take 1 Tab by mouth two (2) times daily (with meals). Indications: Chronic Heart Failure, hypertension Class: Normal  
 Pharmacy: [address] [place] [address] Ph #: [phone] Route: Oral  
 losartan (COZAAR) 25 mg tablet 3 Sig: Take 1 Tab by mouth daily. Class: Normal  
 Pharmacy: [address] [place] [address] Ph #: [phone] Route: Oral  
 lidocaine (LIDODERM) 5 % 8 Si Patch by TransDERmal route daily as needed for Pain. Apply patch to the affected area for 12 hours a day and remove for 12 hours a day. Class: Normal  
 Pharmacy: [address] [place] [address] Ph #: [phone] Route: TransDERmal  
  
We Performed the Following HEPATITIS C AB [55173 CPT(R)] LITHIUM G806322 CPT(R)] METABOLIC PANEL, BASIC [67055 CPT(R)] OCCULT BLOOD, IMMUNOASSAY (FIT) [id number] CPT(R)] Follow-up Instructions Return in about 4 weeks (around 2017) for annual exam, recheck bp. Introducing [facility]! [employer] introduces RecordSetter patient portal. Now you can access parts of your medical record, email your doctor's office, and request medication refills online. 1. In your internet browser, go to [email or web address] 2. Click on the First Time User? Click Here link in the Sign In box. You will see the New Member Sign Up page. 3. Enter your NONO Access Code exactly as it appears below. You will not need to use this code after youve completed the sign-up process. If you do not sign up before the expiration date, you must request a new code. · NONO Access Code: [email or web address] Expires: 8/28/2017  9:41 AM 
 
4. Enter the last four digits of your Social Security Number (xxxx) and Date of Birth (mm/dd/yyyy) as indicated and click Submit. You will be taken to the next sign-up page. 5. Create a NONO ID. This will be your NONO login ID and cannot be changed, so think of one that is secure and easy to remember. 6. Create a NONO password. You can change your password at any time. 7. Enter your Password Reset Question and Answer. This can be used at a later time if you forget your password. 8. Enter your e-mail address. You will receive e-mail notification when new information is available in [address] 9. Click Sign Up. You can now view and download portions of your medical record. 10. Click the Download Summary menu link to download a portable copy of your medical information. If you have questions, please visit the Frequently Asked Questions section of the NONO website. Remember, NONO is NOT to be used for urgent needs. For medical emergencies, dial 911. Now available from your iPhone and Android! Please provide this summary of care documentation to your next provider. Your primary care clinician is listed as Seamus Hyde. If you have any questions after today's visit, please call [phone].

## 2017-05-30 NOTE — PROGRESS NOTES
1. Have you been to the ER, urgent care clinic since your last visit? Hospitalized since your last visit? No    2. Have you seen or consulted any other health care providers outside of the [address] [place] [address] since your last visit? Include any pap smears or colon screening. No      Chief Complaint   Patient presents with    Hypertension     3 month follow up    Pain (Chronic)     3 month follow up, was seeing Dr. Jessie Gold but she moved. Has not found anyone yet because \"no one takes his insurance\".  Rash     Intermittent, on left side of neck. Itching. Has changed soaps/detergents/lotions with no resolution. Not fasting. Had TDaP and Pneumonia shots at [facility] but is going through a lawsuit with them.

## 2017-05-31 LAB
BUN SERPL-MCNC: 13 MG/DL (ref 8–27)
BUN/CREAT SERPL: 11 (ref 10–24)
CALCIUM SERPL-MCNC: 9.9 MG/DL (ref 8.6–10.2)
CHLORIDE SERPL-SCNC: 101 MMOL/L (ref 96–106)
CO2 SERPL-SCNC: 22 MMOL/L (ref 18–29)
CREAT SERPL-MCNC: 1.16 MG/DL (ref 0.76–1.27)
GLUCOSE SERPL-MCNC: 81 MG/DL (ref 65–99)
HCV AB S/CO SERPL IA: <0.1 S/CO RATIO (ref 0–0.9)
LITHIUM SERPL-SCNC: <0.1 MMOL/L (ref 0.6–1.2)
POTASSIUM SERPL-SCNC: 4.6 MMOL/L (ref 3.5–5.2)
SODIUM SERPL-SCNC: 145 MMOL/L (ref 134–144)

## 2017-06-07 ENCOUNTER — OFFICE VISIT (OUTPATIENT)
Dept: CARDIOLOGY CLINIC | Age: 52
End: 2017-06-07

## 2017-06-07 VITALS
HEART RATE: 68 BPM | BODY MASS INDEX: 24.3 KG/M2 | WEIGHT: 173.6 LBS | DIASTOLIC BLOOD PRESSURE: 100 MMHG | RESPIRATION RATE: 16 BRPM | SYSTOLIC BLOOD PRESSURE: 150 MMHG | HEIGHT: 71 IN

## 2017-06-07 DIAGNOSIS — J45.20 MILD INTERMITTENT ASTHMA WITHOUT COMPLICATION: ICD-10-CM

## 2017-06-07 DIAGNOSIS — I10 ESSENTIAL HYPERTENSION WITH GOAL BLOOD PRESSURE LESS THAN 140/90: ICD-10-CM

## 2017-06-07 DIAGNOSIS — D86.9 SARCOID: ICD-10-CM

## 2017-06-07 DIAGNOSIS — I73.9 CLAUDICATION OF BOTH LOWER EXTREMITIES (HCC): Primary | ICD-10-CM

## 2017-06-07 DIAGNOSIS — I10 BENIGN ESSENTIAL HTN: ICD-10-CM

## 2017-06-07 DIAGNOSIS — I10 ESSENTIAL HYPERTENSION: ICD-10-CM

## 2017-06-07 DIAGNOSIS — I50.20 SYSTOLIC CONGESTIVE HEART FAILURE, UNSPECIFIED CONGESTIVE HEART FAILURE CHRONICITY: ICD-10-CM

## 2017-06-07 DIAGNOSIS — M51.36 DDD (DEGENERATIVE DISC DISEASE), LUMBAR: ICD-10-CM

## 2017-06-07 RX ORDER — CARVEDILOL 25 MG/1
25 TABLET ORAL 2 TIMES DAILY WITH MEALS
Qty: 60 TAB | Refills: 11 | Status: SHIPPED | OUTPATIENT
Start: 2017-06-07 | End: 2017-07-26 | Stop reason: SDUPTHER

## 2017-06-07 NOTE — LETTER
6/7/2017 11:12 PM 
 
Patient:  Curtis Perez YOB: 1955 Date of Visit: 6/7/2017 Dear Kenji Ruby MD 
[address] [facility] [address] [facility] [address] VIA Facsimile: [phone] Geraldina Holstein, MD 
[address] [facility] 7 [address] VIA In Basket 
 : 
Mr. 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 Brand. Cardiac cath 05/24/2016 noted no coronary artery disease. At his last visit due to shortness of breath, obtained an echocardiogram that showed preserved LV function unchanged. He started seeing pulmonary, Dr. Maciel Daily for his history of pulmonary sarcoid and asthma. He was started on steroids and inhaler and this helped some, but he still gets short of breath at times. He does complain of weakness in his legs if he goes half a block. No exertional chest pains. No significant palpitations. He is compensated on exam with clear lungs and no lower extremity edema. Blood pressure is elevated at 150/100, heart rate of 68 bpm, but he said he ran out of his Coreg. Assessment and Plan: 1. Claudication. Will obtain ABIs for further evaluation. 2. Dyspnea, pulmonary sarcoid. Followed closely by pulmonary Dr. Maciel Daily and medications are being adjusted. His echocardiogram earlier this year demonstrated preserved LV function and his cardiac catheterization in 2016 noted no CAD. 3. Essential hypertension. Blood pressure is elevated here. He says he ran out of Coreg and will refill this. 4. Chest pains. Noncardiac, possibly musculoskeletal.  
5. Chronic back pain. Followed by Dr. Arabella Romero. 6. History of alcohol and tobacco abuse. Quit in 12/2015 and 03/2016.  
7. Bipolar. If you have questions, please do not hesitate to call me. Sincerely, Beni Nevarez MD

## 2017-06-07 NOTE — PROGRESS NOTES
[place]: Shauna Hunt  [phone]    History of Present Illness:   Mr. 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 Rox José. Cardiac cath 05/24/2016 noted no coronary artery disease. At his last visit due to shortness of breath, obtained an echocardiogram that showed preserved LV function unchanged. He started seeing pulmonary, Dr. Peggy Ferrer for his history of pulmonary sarcoid and asthma. He was started on steroids and inhaler and this helped some, but he still gets short of breath at times. He does complain of weakness in his legs if he goes half a block. No exertional chest pains. No significant palpitations. He is compensated on exam with clear lungs and no lower extremity edema. Blood pressure is elevated at 150/100, heart rate of 68 bpm, but he said he ran out of his Coreg. Assessment and Plan:   1. Claudication. Will obtain ABIs for further evaluation. 2. Dyspnea, pulmonary sarcoid. Followed closely by pulmonary Dr. Peggy Ferrer and medications are being adjusted. His echocardiogram earlier this year demonstrated preserved LV function and his cardiac catheterization in 2016 noted no CAD. 3. Essential hypertension. Blood pressure is elevated here. He says he ran out of Coreg and will refill this. 4. Chest pains. Noncardiac, possibly musculoskeletal.   5. Chronic back pain. Followed by Dr. Melita Wetzel. 6. History of alcohol and tobacco abuse. Quit in 12/2015 and 03/2016.   7. Bipolar. He  has a past medical history of Asthma; Bipolar disorder ([address] Cataracts, bilateral; DDD (degenerative disc disease); Fracture of left humerus; Glaucoma; Heart failure ([address] Hypertension; Ill-defined condition (2000);  Ill-defined condition; Ill-defined condition (2016); Other ill-defined conditions; Sarcoid ([facility] and Sarcoidosis ([facility] All other systems negative except as above. PE  Vitals:    06/07/17 1247   BP: (!) 150/100   Pulse: 68   Resp: 16   Weight: 173 lb 9.6 oz (78.7 kg)   Height: 5' 11\" (1.803 m)    Body mass index is 24.21 kg/(m^2).    General appearance - alert, well appearing, and in no distress  Mental status - affect appropriate to mood  Eyes - sclera anicteric, moist mucous membranes  Neck - supple, no JVD  Chest - clear to auscultation, no wheezes, rales or rhonchi  Heart - normal rate, regular rhythm, normal S1, S2, no murmurs, rubs, clicks or gallops  Abdomen - soft, nontender, nondistended, no masses or organomegaly  Extremities - peripheral pulses normal, no pedal edema      Recent Labs:  Lab Results   Component Value Date/Time    Cholesterol, total 143 12/30/2015 03:24 AM    HDL Cholesterol 84 12/30/2015 03:24 AM    LDL, calculated 45.8 12/30/2015 03:24 AM    Triglyceride 66 12/30/2015 03:24 AM    CHOL/HDL Ratio 1.7 12/30/2015 03:24 AM     Lab Results   Component Value Date/Time    Creatinine (POC) 1.1 12/28/2015 07:03 PM    Creatinine 1.16 05/30/2017 09:26 AM     Lab Results   Component Value Date/Time    BUN 13 05/30/2017 09:26 AM    BUN (POC) 14 12/28/2015 07:03 PM     Lab Results   Component Value Date/Time    Potassium 4.6 05/30/2017 09:26 AM     No results found for: HBA1C, HGBE8, TWU0WDVP, JVK2IUPE  Lab Results   Component Value Date/Time    Hemoglobin (POC) 18.7 12/28/2015 07:03 PM    HGB 15.3 06/07/2016 04:00 AM     Lab Results   Component Value Date/Time    PLATELET 557 48/38/7969 04:00 AM       Reviewed:  Past Medical History:   Diagnosis Date    Asthma     Bipolar disorder (HCC)     Bi-polar, Anxiety    Cataracts, bilateral     DDD (degenerative disc disease)     Fracture of left humerus     non operative    Glaucoma     Heart failure ([address]     Degenerated heart failure    Hypertension     Ill-defined condition 2000    DX WITH SARCODOSIS    Ill-defined condition     USES O2 AT 3;30AM TO 5 AM EVERDAY & AS NEEDED    Ill-defined condition 2016    HEP C    Other ill-defined conditions     Glaucoma    Sarcoid ([facility]     Sarcoidosis ([facility]     lung      History   Smoking Status    Former Smoker    Packs/day: 0.25    Years: 6.00    Quit date: 2/6/2016   Smokeless Tobacco    Never Used     History   Alcohol Use No     Allergies   Allergen Reactions    Pcn [Penicillins] Anaphylaxis    Shellfish Derived Anaphylaxis    Iodine Hives and Swelling       Current Outpatient Prescriptions   Medication Sig    carvedilol (COREG) 25 mg tablet Take 1 Tab by mouth two (2) times daily (with meals). Indications: Chronic Heart Failure, hypertension    losartan (COZAAR) 25 mg tablet Take 1 Tab by mouth daily.  lidocaine (LIDODERM) 5 % 1 Patch by TransDERmal route daily as needed for Pain. Apply patch to the affected area for 12 hours a day and remove for 12 hours a day.  ketoconazole (NIZORAL) 2 % topical cream Apply  to affected area daily. Rash on neck, continue for 2 weeks after rash resolves    methylPREDNISolone (MEDROL DOSEPACK) 4 mg tablet Per pack instruction    traMADol (ULTRAM) 50 mg tablet Take 1 Tab by mouth every eight (8) hours as needed for Pain. Max Daily Amount: 150 mg.  predniSONE (DELTASONE) 20 mg tablet Take 1 Tab by mouth daily (with breakfast). Indications: sarcoidosis    potassium chloride SR 20 mEq TbER Take 20 mEq by mouth daily.  lithium carbonate 150 mg capsule Take 150 mg by mouth two (2) times a day.  prednisoLONE acetate (PRED FORTE) 1 % ophthalmic suspension Administer 1 Drop to both eyes four (4) times daily. Indications: CATARACTS; SARCOIDOSIS    aspirin delayed-release 325 mg tablet Take 325 mg by mouth daily.  nitroglycerin (NITROSTAT) 0.4 mg SL tablet 1 Tab by SubLINGual route every five (5) minutes as needed for Chest Pain (Max dose of 3).     ALPRAZolam Beatrice Harper) 1 mg tablet Take 1 mg by mouth nightly. No current facility-administered medications for this visit.         Mitali Clark MD  [facility] [place]  [facility], [address] [facility] [address]  [address]

## 2017-06-07 NOTE — TELEPHONE ENCOUNTER
Requested Prescriptions     Signed Prescriptions Disp Refills    carvedilol (COREG) 25 mg tablet 60 Tab 11     Sig: Take 1 Tab by mouth two (2) times daily (with meals).  Indications: Chronic Heart Failure, hypertension     Authorizing Provider: Jeronimo Downs     Ordering User: Lisha Montoya

## 2017-06-07 NOTE — MR AVS SNAPSHOT
Visit Information Date & Time Provider Department Dept. Phone Encounter #  
 6/7/2017  1:20 PM Marija Garcia MD [facility] [place] [phone] [id number] Your Appointments 6/7/2017  1:20 PM  
ESTABLISHED PATIENT with Marija Garcia MD  
[facility] ([address]) Appt Note: 4-5 month follow up; 4-5 month follow up pt r/s from 5/24 to 6/2; 4-5 month follow up pt r/s from 06/02 to 06/06; 4-5 month follow up pt r/s from 6/6 to 6/7  
 [address] [place]  
[address] [place]  
  
    
 6/14/2017  1:00 PM  
VASCULAR TEST with VASCULAR, [facility] (HO[facility] SCHEDULING) Appt Note: Per Dr. Catalina Medina ANNIA dx claudication [address] [facility] [address]  
[address] [facility] [address]  
  
    
 6/27/2017  8:30 AM  
Any with Sina Winters MD  
[address] [place] [address] and Primary Care [address] [facility]) Appt Note: 1 month follow up  
 [address] [facility] [place]  
  
   
 [address] [address]  
  
    
 12/6/2017  1:00 PM  
ESTABLISHED PATIENT with Marija Garcia MD  
[facility] ([address]) Appt Note: 6 month follow up  
 [address] [place]  
[phone] Upcoming Health Maintenance Date Due Pneumococcal 19-64 Medium Risk (1 of 1 - PPSV23) 3/9/1976 FOBT Q 1 YEAR AGE 50-75 3/9/2007 ZOSTER VACCINE AGE 60> 3/9/2017 DTaP/Tdap/Td series (1 - Tdap) 7/28/2017* INFLUENZA AGE 9 TO ADULT 8/1/2017 *Topic was postponed. The date shown is not the original due date. Allergies as of 6/7/2017  Review Complete On: 6/7/2017 By: Esperanza Coy Severity Noted Reaction Type Reactions Pcn [Penicillins] High 01/18/2016    Anaphylaxis Shellfish Derived High 03/01/2016    Anaphylaxis Iodine  04/28/2010    Hives, Swelling Current Immunizations  Reviewed on 1/3/2016 Name Date Influenza Vaccine Whole 11/28/2009 TD Vaccine 12/17/2009 Not reviewed this visit Vitals BP Pulse Resp Height(growth percentile) Weight(growth percentile) BMI  
 (!) 150/100 (BP 1 Location: Left arm, BP Patient Position: Sitting) 68 16 5' 11\" (1.803 m) 173 lb 9.6 oz (78.7 kg) 24.21 kg/m2 Smoking Status Former Smoker Vitals History BMI and BSA Data Body Mass Index Body Surface Area  
 24.21 kg/m 2 1.99 m 2 Preferred Pharmacy Pharmacy Name Phone [address], [address] [phone] Your Updated Medication List  
  
   
This list is accurate as of: 6/7/17  1:18 PM.  Always use your most recent med list.  
  
  
  
  
 ALPRAZolam 1 mg tablet Commonly known as:  Terra Pritchetts Take 1 mg by mouth nightly. aspirin delayed-release 325 mg tablet Take 325 mg by mouth daily. carvedilol 25 mg tablet Commonly known as:  Tessie [place] Take 1 Tab by mouth two (2) times daily (with meals). Indications: Chronic Heart Failure, hypertension  
  
 ketoconazole 2 % topical cream  
Commonly known as:  NIZORAL Apply  to affected area daily. Rash on neck, continue for 2 weeks after rash resolves  
  
 lidocaine 5 % Commonly known as:  LIDODERM  
1 Patch by TransDERmal route daily as needed for Pain. Apply patch to the affected area for 12 hours a day and remove for 12 hours a day. lithium carbonate 150 mg capsule Take 150 mg by mouth two (2) times a day. losartan 25 mg tablet Commonly known as:  COZAAR Take 1 Tab by mouth daily. methylPREDNISolone 4 mg tablet Commonly known as:  Marcejose angel Doherty Per pack instruction  
  
 nitroglycerin 0.4 mg SL tablet Commonly known as:  NITROSTAT 1 Tab by SubLINGual route every five (5) minutes as needed for Chest Pain (Max dose of 3). potassium chloride SR 20 mEq tablet Commonly known as:  K-TAB Take 20 mEq by mouth daily. prednisoLONE acetate 1 % ophthalmic suspension Commonly known as:  PRED FORTE Administer 1 Drop to both eyes four (4) times daily. Indications: CATARACTS; SARCOIDOSIS  
  
 predniSONE 20 mg tablet Commonly known as:  Larrie Doom Take 1 Tab by mouth daily (with breakfast). Indications: sarcoidosis  
  
 traMADol 50 mg tablet Commonly known as:  ULTRAM  
Take 1 Tab by mouth every eight (8) hours as needed for Pain. Max Daily Amount: 150 mg. Introducing [facility]! Vianca Marie introduces [employer] patient portal. Now you can access parts of your medical record, email your doctor's office, and request medication refills online. 1. In your internet browser, go to https://"[employer]". [employer]/"[employer]" 2. Click on the First Time User? Click Here link in the Sign In box. You will see the New Member Sign Up page. 3. Enter your [employer] Access Code exactly as it appears below. You will not need to use this code after youve completed the sign-up process. If you do not sign up before the expiration date, you must request a new code. · [employer] Access Code: [email or web address] Expires: 8/28/2017  9:41 AM 
 
4. Enter the last four digits of your Social Security Number (xxxx) and Date of Birth (mm/dd/yyyy) as indicated and click Submit. You will be taken to the next sign-up page. 5. Create a GPX Softwaret ID. This will be your [employer] login ID and cannot be changed, so think of one that is secure and easy to remember. 6. Create a [employer] password. You can change your password at any time. 7. Enter your Password Reset Question and Answer. This can be used at a later time if you forget your password. 8. Enter your e-mail address.  You will receive e-mail notification when new information is available in [employer]. 9. Click Sign Up. You can now view and download portions of your medical record. 10. Click the Download Summary menu link to download a portable copy of your medical information. If you have questions, please visit the Frequently Asked Questions section of the [employer] website. Remember, [employer] is NOT to be used for urgent needs. For medical emergencies, dial 911. Now available from your iPhone and Android! Please provide this summary of care documentation to your next provider. Your primary care clinician is listed as Claudia Bae. If you have any questions after today's visit, please call [phone].

## 2017-06-14 ENCOUNTER — CLINICAL SUPPORT (OUTPATIENT)
Dept: CARDIOLOGY CLINIC | Age: 52
End: 2017-06-14

## 2017-06-14 DIAGNOSIS — I73.9 CLAUDICATION OF BOTH LOWER EXTREMITIES (HCC): ICD-10-CM

## 2017-06-14 NOTE — PROCEDURES
[facility]  *** FINAL REPORT ***    Name: Mila Floyd  MRN: [id number]       Outpatient  : 09 Mar 1955  HIS Order #: [id number]  [address] [facility] Visit #: [id number]  Date: 2017    TYPE OF TEST: Peripheral Arterial Testing    REASON FOR TEST  Claudication (both sides)    Right Leg  Segmentals: Normal                     mmHg  Brachial         128  High thigh  Low thigh  Calf  Posterior tibial 138  Dorsalis pedis   129  Peroneal  Metatarsal  Toe pressure  Doppler:    Normal  PVR:        Normal  Ankle/Brachial: 1.08    Left Leg  Segmentals: Normal                     mmHg  Brachial         113  High thigh  Low thigh  Calf  Posterior tibial 135  Dorsalis pedis   146  Peroneal  Metatarsal  Toe pressure  Doppler:    Normal  PVR:        Normal  Ankle/Brachial: 1.14  Post exercise results:  Speed:  mph  Grade:  Duration: 5     Brachial  Right Ankle  ANNIA    Left Ankle  ANNIA    1:   143         195     1.36       181     1.27  2:  3:  4:  5:    INTERPRETATION/FINDINGS  PROCEDURE:  Evaluation of lower extremity arteries with systolic blood   pressure measurement at the ankle and brachial level and calculation  of the ankle/brachial pressure index (ANNIA). The exam may also include   pulse volume recording (PVR) plethysmography at the ankle level. FINDINGS:  Normal triphasic Doppler waveforms are demonstrated within  the distal posterior tibial and dorsalis pedis arteries bilaterally. Pulse volume recordings at the ankle and metatarsal levels are normal  in configuration. Resting ABIs are normal at 1.08 on the right and  1.14 on the left. Exercise: The patient walked for 5 minutes. He exhibited significant   dyspnea. Post exercise, there was a rise in ankle pressure  bilaterally and ABIs remained within normal limits at 1.36 on the  right and 1.27 on the left. IMPRESSION:  No evidence of arterial occlusive disease in either lower   extremity.     ADDITIONAL COMMENTS    I have personally reviewed the data relevant to the interpretation of  this  study. TECHNOLOGIST: Jessica Gtz RVT, RDMS, RDCS  Signed: 06/14/2017 01:49 PM    PHYSICIAN: Christine Wells.  Lissett Loja MD  Signed: 06/15/2017 01:36 PM

## 2017-06-16 ENCOUNTER — TELEPHONE (OUTPATIENT)
Dept: CARDIOLOGY CLINIC | Age: 52
End: 2017-06-16

## 2017-06-16 NOTE — TELEPHONE ENCOUNTER
----- Message from Eve Hewitt MD sent at 6/15/2017  2:03 PM EDT -----  Please let pt know ABIs were normal. thx  ----- Message -----     From: Brien Rudd MD     Sent: 6/15/2017   1:36 PM       To: Eve Hewitt MD        The above test results given to patient.   2 pt identifiers used

## 2017-07-24 ENCOUNTER — HOSPITAL ENCOUNTER (OUTPATIENT)
Dept: CT IMAGING | Age: 52
Discharge: HOME | End: 2017-07-24
Attending: NURSE PRACTITIONER
Payer: MEDICAID

## 2017-07-24 DIAGNOSIS — D86.9 SARCOID: ICD-10-CM

## 2017-07-24 PROCEDURE — 71250 CT THORAX DX C-: CPT

## 2017-07-26 ENCOUNTER — OFFICE VISIT (OUTPATIENT)
Dept: INTERNAL MEDICINE CLINIC | Age: 52
End: 2017-07-26

## 2017-07-26 VITALS
OXYGEN SATURATION: 91 % | HEIGHT: 71 IN | HEART RATE: 122 BPM | TEMPERATURE: 98.1 F | SYSTOLIC BLOOD PRESSURE: 150 MMHG | BODY MASS INDEX: 23.13 KG/M2 | DIASTOLIC BLOOD PRESSURE: 95 MMHG | RESPIRATION RATE: 17 BRPM | WEIGHT: 165.2 LBS

## 2017-07-26 DIAGNOSIS — M54.41 CHRONIC LOW BACK PAIN WITH RIGHT-SIDED SCIATICA, UNSPECIFIED BACK PAIN LATERALITY: ICD-10-CM

## 2017-07-26 DIAGNOSIS — I10 ESSENTIAL HYPERTENSION WITH GOAL BLOOD PRESSURE LESS THAN 140/90: ICD-10-CM

## 2017-07-26 DIAGNOSIS — I50.20 SYSTOLIC CONGESTIVE HEART FAILURE, UNSPECIFIED CONGESTIVE HEART FAILURE CHRONICITY: ICD-10-CM

## 2017-07-26 DIAGNOSIS — I10 ESSENTIAL HYPERTENSION: ICD-10-CM

## 2017-07-26 DIAGNOSIS — G89.29 CHRONIC LOW BACK PAIN WITH RIGHT-SIDED SCIATICA, UNSPECIFIED BACK PAIN LATERALITY: ICD-10-CM

## 2017-07-26 RX ORDER — POTASSIUM CHLORIDE 1500 MG/1
20 TABLET, FILM COATED, EXTENDED RELEASE ORAL DAILY
Qty: 30 TAB | Refills: 3 | Status: CANCELLED | OUTPATIENT
Start: 2017-07-26

## 2017-07-26 RX ORDER — TRAMADOL HYDROCHLORIDE 50 MG/1
50 TABLET ORAL
Qty: 30 TAB | Refills: 0 | Status: CANCELLED | OUTPATIENT
Start: 2017-07-26

## 2017-07-26 RX ORDER — CARVEDILOL 25 MG/1
25 TABLET ORAL 2 TIMES DAILY WITH MEALS
Qty: 180 TAB | Refills: 4 | Status: SHIPPED | OUTPATIENT
Start: 2017-07-26 | End: 2018-04-17 | Stop reason: SDUPTHER

## 2017-07-26 RX ORDER — LOSARTAN POTASSIUM 100 MG/1
100 TABLET ORAL DAILY
Qty: 90 TAB | Refills: 4 | Status: SHIPPED | OUTPATIENT
Start: 2017-07-26 | End: 2018-04-17 | Stop reason: SDUPTHER

## 2017-07-26 RX ORDER — LIDOCAINE 50 MG/G
1 PATCH TOPICAL
Qty: 1 PACKAGE | Refills: 12 | Status: SHIPPED | OUTPATIENT
Start: 2017-07-26 | End: 2018-10-29 | Stop reason: SDUPTHER

## 2017-07-26 NOTE — PROGRESS NOTES
Mr. Joceline Hernandez is a 63y.o. year old male who had concerns including Hypertension and Medication Refill. HPI:  Chief Complaint   Patient presents with    Hypertension    Medication Refill     Tuesday 120/80 home BPs  BP Readings from Last 3 Encounters:   07/26/17 (!) 150/95   06/07/17 (!) 150/100   05/30/17 146/89       Past Medical History:   Diagnosis Date    Asthma     Bipolar disorder (HCC)     Bi-polar, Anxiety    Cataracts, bilateral     DDD (degenerative disc disease)     Fracture of left humerus     non operative    Glaucoma     Heart failure (HCC)     Degenerated heart failure    Hypertension     Ill-defined condition 2000    DX WITH SARCODOSIS    Ill-defined condition     USES O2 AT 3;30AM TO 5 AM EVERDAY & AS NEEDED    Ill-defined condition 2016    HEP C    Other ill-defined conditions     Glaucoma    Sarcoid ([address]     Sarcoidosis ([facility] [address]     lung      Current Outpatient Prescriptions   Medication Sig Dispense    lidocaine (LIDODERM) 5 % 1 Patch by TransDERmal route daily as needed for Pain. Apply patch to the affected area for 12 hours a day and remove for 12 hours a day. 1 Package    losartan (COZAAR) 100 mg tablet Take 1 Tab by mouth daily. 90 Tab    carvedilol (COREG) 25 mg tablet Take 1 Tab by mouth two (2) times daily (with meals). Indications: Chronic Heart Failure, hypertension 180 Tab    traMADol (ULTRAM) 50 mg tablet Take 1 Tab by mouth every eight (8) hours as needed for Pain. Max Daily Amount: 150 mg. 30 Tab    predniSONE (DELTASONE) 20 mg tablet Take 1 Tab by mouth daily (with breakfast). Indications: sarcoidosis 100 Tab    potassium chloride SR 20 mEq TbER Take 20 mEq by mouth daily. 30 Tab    lithium carbonate 150 mg capsule Take 150 mg by mouth two (2) times a day.  prednisoLONE acetate (PRED FORTE) 1 % ophthalmic suspension Administer 1 Drop to both eyes four (4) times daily.  Indications: CATARACTS; SARCOIDOSIS     aspirin delayed-release 325 mg tablet Take 325 mg by mouth daily.  nitroglycerin (NITROSTAT) 0.4 mg SL tablet 1 Tab by SubLINGual route every five (5) minutes as needed for Chest Pain (Max dose of 3). 1 Bottle    ALPRAZolam (XANAX) 1 mg tablet Take 1 mg by mouth nightly.  ketoconazole (NIZORAL) 2 % topical cream Apply  to affected area daily. Rash on neck, continue for 2 weeks after rash resolves 30 g    methylPREDNISolone (MEDROL DOSEPACK) 4 mg tablet Per pack instruction 1 Dose Pack     No current facility-administered medications for this visit. Reviewed PmHx, RxHx, FmHx, SocHx, AllgHx and updated and dated in the chart. ROS: Negative except for BOLD  General: fever, chills, fatigue  Respiratory: cough, SOB, wheezing  Cardiovascular:  CP, palpitation, LAW, edema   Gastrointestinal: N/V/D, bleeding  Genito-Urinary: dysuria, hematuria  Musculoskeletal: muscle weakness, pain, swelling    OBJECTIVE:   Visit Vitals    BP (!) 150/95 (BP 1 Location: Right arm, BP Patient Position: Sitting)    Pulse (!) 122    Temp 98.1 °F (36.7 °C) (Oral)    Resp 17    Ht 5' 11\" (1.803 m)    Wt 165 lb 3.2 oz (74.9 kg)    SpO2 91%    BMI 23.04 kg/m2     GEN: The patient appears well, alert, oriented x 3, in no distress. ENT: bilateral TM and canal normal.  Neck supple. No adenopathy or thyromegaly. HE. Lungs: clear bilaterally, good air entry, no wheezes, rhonchi or rales. Cardiovascular: regular rate and rhythm. S1 and S2 normal, no murmurs,  Abdomen: + BS, soft without tenderness, guarding, rebound, mass or organomegaly. Extremities: no edema, normal peripheral pulses. Neurological: normal, gross sensory and motor in tact without focal findings. Assessment/ Plan:       ICD-10-CM ICD-9-CM    1. Essential hypertension I10 401.9 losartan (COZAAR) 100 mg tablet      carvedilol (COREG) 25 mg tablet   2.  Systolic congestive heart failure, unspecified congestive heart failure chronicity (HCC) I50.20 428.20 losartan (COZAAR) 100 mg tablet     428.0 carvedilol (COREG) 25 mg tablet   3. Essential hypertension with goal blood pressure less than 140/90 I10 401.9 carvedilol (COREG) 25 mg tablet   4. Chronic low back pain with right-sided sciatica, unspecified back pain laterality M54.41 724.2 lidocaine (LIDODERM) 5 %    G89.29 724.3      338.29        Increase BP medication for better control. I have discussed the diagnosis with the patient and the intended plan as seen in the above orders. The patient has received an after-visit summary and questions were answered concerning future plans. Medication Side Effects and Warnings were discussed with patient.     Follow-up Disposition: Not on R Norma Canas MD

## 2017-08-17 ENCOUNTER — OFFICE VISIT (OUTPATIENT)
Dept: INTERNAL MEDICINE CLINIC | Age: 52
End: 2017-08-17

## 2017-08-17 VITALS
SYSTOLIC BLOOD PRESSURE: 101 MMHG | HEART RATE: 81 BPM | RESPIRATION RATE: 17 BRPM | DIASTOLIC BLOOD PRESSURE: 67 MMHG | WEIGHT: 167.3 LBS | TEMPERATURE: 98.1 F | HEIGHT: 71 IN | BODY MASS INDEX: 23.42 KG/M2 | OXYGEN SATURATION: 93 %

## 2017-08-17 DIAGNOSIS — R21 RASH: ICD-10-CM

## 2017-08-17 DIAGNOSIS — G89.29 CHRONIC LOW BACK PAIN WITH RIGHT-SIDED SCIATICA, UNSPECIFIED BACK PAIN LATERALITY: ICD-10-CM

## 2017-08-17 DIAGNOSIS — F43.9 STRESS: ICD-10-CM

## 2017-08-17 DIAGNOSIS — D86.9 SARCOIDOSIS: Primary | ICD-10-CM

## 2017-08-17 DIAGNOSIS — M26.622 ARTHRALGIA OF LEFT TEMPOROMANDIBULAR JOINT: ICD-10-CM

## 2017-08-17 DIAGNOSIS — M54.41 CHRONIC LOW BACK PAIN WITH RIGHT-SIDED SCIATICA, UNSPECIFIED BACK PAIN LATERALITY: ICD-10-CM

## 2017-08-17 RX ORDER — TRAMADOL HYDROCHLORIDE 50 MG/1
50 TABLET ORAL
Qty: 30 TAB | Refills: 0 | Status: SHIPPED | OUTPATIENT
Start: 2017-08-17 | End: 2018-03-30

## 2017-08-17 RX ORDER — KETOCONAZOLE 20 MG/G
CREAM TOPICAL DAILY
Qty: 60 G | Refills: 6 | Status: SHIPPED | OUTPATIENT
Start: 2017-08-17 | End: 2018-03-30

## 2017-08-17 RX ORDER — PREDNISONE 5 MG/1
5 TABLET ORAL
Qty: 90 TAB | Refills: 4 | Status: SHIPPED | OUTPATIENT
Start: 2017-08-17 | End: 2017-11-16 | Stop reason: SDUPTHER

## 2017-08-17 NOTE — MR AVS SNAPSHOT
Visit Information Date & Time Provider Department Dept. Phone Encounter #  
 8/17/2017  9:00 AM Kimberly Reis MD [facility] and [place] [address] [id number] Follow-up Instructions Return in about 6 months (around 2/17/2018), or if symptoms worsen or fail to improve, for Blood Pressure. Follow-up and Disposition History Your Appointments 10/25/2017  8:00 AM  
Any with Kimberly Reis MD  
[address] [place] [address] and Primary Care [address] [place] [address]) Appt Note: follow up 3mnths  
 [address] [facility] [place]  
  
   
 [address] [phone]  
  
    
 12/6/2017  1:00 PM  
ESTABLISHED PATIENT with Lulu Sandra MD  
[facility] ([address] [facility]) Appt Note: 6 month follow up  
 [address] [place] [address]  
[address] [facility] [address] Upcoming Health Maintenance Date Due Pneumococcal 19-64 Medium Risk (1 of 1 - PPSV23) 3/9/1976 FOBT Q 1 YEAR AGE 50-75 3/9/2007 DTaP/Tdap/Td series (1 - Tdap) 12/18/2009 ZOSTER VACCINE AGE 60> 1/9/2017 INFLUENZA AGE 9 TO ADULT 8/1/2017 Allergies as of 8/17/2017  Review Complete On: 8/17/2017 By: Cathy Quigley Severity Noted Reaction Type Reactions Pcn [Penicillins] High 01/18/2016    Anaphylaxis Shellfish Derived High 03/01/2016    Anaphylaxis Iodine  04/28/2010    Hives, Swelling Current Immunizations  Reviewed on 1/3/2016 Name Date Influenza Vaccine Whole 11/28/2009 TD Vaccine 12/17/2009 Not reviewed this visit You Were Diagnosed With   
  
 Codes Comments Sarcoidosis ([facility] [address]    -  Primary ICD-10-CM: P92.5 ICD-9-CM: 135 Chronic low back pain with right-sided sciatica, unspecified back pain laterality     ICD-10-CM: M54.41, G89.29 ICD-9-CM: 724.2, 724.3, 338.29 Rash     ICD-10-CM: R21 
ICD-9-CM: 782.1 Arthralgia of left temporomandibular joint     ICD-10-CM: D01.518 ICD-9-CM: 524.62 Stress     ICD-10-CM: F43.9 ICD-9-CM: V62.89 Vitals BP Pulse Temp Resp Height(growth percentile) Weight(growth percentile) 101/67 (BP 1 Location: Right arm, BP Patient Position: Sitting) 81 98.1 °F (36.7 °C) (Oral) 17 5' 11\" (1.803 m) 167 lb 4.8 oz (75.9 kg) SpO2 BMI Smoking Status 93% 23.33 kg/m2 Former Smoker Vitals History BMI and BSA Data Body Mass Index Body Surface Area  
 23.33 kg/m 2 1.95 m 2 Preferred Pharmacy Pharmacy Name Phone Rebelle Bridal [email or web address], [address] [phone] Your Updated Medication List  
  
   
This list is accurate as of: 8/17/17 10:24 AM.  Always use your most recent med list.  
  
  
  
  
 ALPRAZolam 1 mg tablet Commonly known as:  Samira [place] Take 1 mg by mouth nightly. Takes 2 X day  
  
 aspirin delayed-release 325 mg tablet Take 325 mg by mouth daily. carvedilol 25 mg tablet Commonly known as:  Cherylnia Elders Take 1 Tab by mouth two (2) times daily (with meals). Indications: Chronic Heart Failure, hypertension  
  
 ketoconazole 2 % topical cream  
Commonly known as:  NIZORAL Apply  to affected area daily. Rash on neck, continue for 2 weeks after rash resolves  
  
 lidocaine 5 % Commonly known as:  LIDODERM  
1 Patch by TransDERmal route daily as needed for Pain. Apply patch to the affected area for 12 hours a day and remove for 12 hours a day. lithium carbonate 150 mg capsule Take 150 mg by mouth two (2) times a day. losartan 100 mg tablet Commonly known as:  COZAAR Take 1 Tab by mouth daily. nitroglycerin 0.4 mg SL tablet Commonly known as:  NITROSTAT  
1 Tab by SubLINGual route every five (5) minutes as needed for Chest Pain (Max dose of 3). potassium chloride SR 20 mEq tablet Commonly known as:  K-TAB Take 20 mEq by mouth daily. prednisoLONE acetate 1 % ophthalmic suspension Commonly known as:  PRED FORTE Administer 1 Drop to both eyes four (4) times daily. Indications: CATARACTS; SARCOIDOSIS  
  
 predniSONE 5 mg tablet Commonly known as:  Maribeth [place] Take 1 Tab by mouth daily (with breakfast). Indications: sarcoidosis  
  
 traMADol 50 mg tablet Commonly known as:  ULTRAM  
Take 1 Tab by mouth every eight (8) hours as needed for Pain. Max Daily Amount: 150 mg.  
  
  
  
  
Prescriptions Printed Refills  
 traMADol (ULTRAM) 50 mg tablet 0 Sig: Take 1 Tab by mouth every eight (8) hours as needed for Pain. Max Daily Amount: 150 mg.  
 Class: Print Route: Oral  
  
Prescriptions Sent to Pharmacy Refills  
 predniSONE (DELTASONE) 5 mg tablet 4 Sig: Take 1 Tab by mouth daily (with breakfast). Indications: sarcoidosis Class: Normal  
 Pharmacy: [email or web address] [address] Ph #: [phone] Route: Oral  
 ketoconazole (NIZORAL) 2 % topical cream 6 Sig: Apply  to affected area daily. Rash on neck, continue for 2 weeks after rash resolves Class: Normal  
 Pharmacy: [email or web address] [address] Ph #: [phone] Route: Topical  
  
Follow-up Instructions Return in about 6 months (around 2/17/2018), or if symptoms worsen or fail to improve, for Blood Pressure. Patient Instructions Temporomandibular Disorder: Care Instructions Your Care Instructions Temporomandibular (TM) disorders are a problem with the muscles and joints that connect your jaw to your skull. They cause pain when you open your mouth, chew, or yawn. You may feel this pain on one or both sides. TM disorders are often caused by tight jaw muscles. The tightness can be caused by clenching or grinding your teeth. This may happen when you have a lot of stress in your life.  
If you lower your stress, you may be able to stop clenching or grinding your teeth. This will help relax your jaw and reduce your pain. You may also be able to do some things at home to feel better. But if none of this works, your doctor may prescribe medicine to help relax your muscles and control the pain. Follow-up care is a key part of your treatment and safety. Be sure to make and go to all appointments, and call your doctor if you are having problems. It's also a good idea to know your test results and keep a list of the medicines you take. How can you care for yourself at home? · Put a warm, moist cloth or heating pad set on low on your jaw. Do this for 10 to 20 minutes at a time. Put a thin cloth between the heating pad and your skin. · Avoid hard or chewy foods that cause your jaws to work very hard. Examples include popcorn, jerky, tough meats, chewy breads, gum, and raw apples and carrots. · Choose softer foods that are easy to chew. These include eggs, yogurt, and soup. · Cut your food into small pieces. Chew slowly. · If your jaw gets too painful to chew, or if it locks, you may need to puree your food for a few days or weeks. · To relax your jaw, repeat this exercise for a few minutes every morning and evening. Watch yourself in a mirror. Gently open and close your mouth. Move your jaw straight up and down. But don't do this if it makes your pain worse. · Get at least 30 minutes of exercise on most days of the week to relieve stress. Walking is a good choice. You also may want to do other activities, such as running, swimming, cycling, or playing tennis or team sports. · Do not: 
¨ Hold a phone between your shoulder and your jaw. ¨ Open your mouth all the way, like when you sing loudly or yawn. ¨ Clench or grind your teeth, bite your lips, or chew your fingernails. ¨ Clench things such as pens, pipes, or cigars between your teeth. When should you call for help? Call your doctor now or seek immediate medical care if: · Your jaw is locked open or shut or it is hard to move your jaw. Watch closely for changes in your health, and be sure to contact your doctor if: 
· Your jaw pain gets worse. · Your face is swollen. · You do not get better as expected. Where can you learn more? Go to http://marylin-chan.info/. Enter D423 in the search box to learn more about \"Temporomandibular Disorder: Care Instructions. \" Current as of: August 9, 2016 Content Version: 11.3 © 9275-1933 [employer]. Care instructions adapted under license by [employer] (which disclaims liability or warranty for this information). If you have questions about a medical condition or this instruction, always ask your healthcare professional. [address] any warranty or liability for your use of this information. Introducing [facility]! Evangelina Blair introduces [employer] patient portal. Now you can access parts of your medical record, email your doctor's office, and request medication refills online. 1. In your internet browser, go to [email or web address] 2. Click on the First Time User? Click Here link in the Sign In box. You will see the New Member Sign Up page. 3. Enter your [employer] Access Code exactly as it appears below. You will not need to use this code after youve completed the sign-up process. If you do not sign up before the expiration date, you must request a new code. · [employer] Access Code: [email or web address] Expires: 8/28/2017  9:41 AM 
 
4. Enter the last four digits of your Social Security Number (xxxx) and Date of Birth (mm/dd/yyyy) as indicated and click Submit. You will be taken to the next sign-up page. 5. Create a [employer]ic Glacialt ID. This will be your [employer] login ID and cannot be changed, so think of one that is secure and easy to remember. 6. Create a [employer] password. You can change your password at any time. 7. Enter your Password Reset Question and Answer. This can be used at a later time if you forget your password. 8. Enter your e-mail address. You will receive e-mail notification when new information is available in [address] 9. Click Sign Up. You can now view and download portions of your medical record. 10. Click the Download Summary menu link to download a portable copy of your medical information. If you have questions, please visit the Frequently Asked Questions section of the [employer] website. Remember, [employer] is NOT to be used for urgent needs. For medical emergencies, dial 911. Now available from your iPhone and Android! Please provide this summary of care documentation to your next provider. Your primary care clinician is listed as [employer] If you have any questions after today's visit, please call [phone].

## 2017-08-17 NOTE — PROGRESS NOTES
Mr. Elizabeth Michel is a 63y.o. year old male who had concerns including Ear Pain; Rash; Medication Refill; and Hypertension. HPI:  Chief Complaint   Patient presents with    Ear Pain     LT [place]    Rash     LT Neck Prednisone    Medication Refill    Hypertension:      BP Readings from Last 3 Encounters:   08/17/17 101/67   07/26/17 (!) 150/95   06/07/17 (!) 150/100       Past Medical History:   Diagnosis Date    Asthma     Bipolar disorder (HCC)     Bi-polar, Anxiety    Cataracts, bilateral     DDD (degenerative disc disease)     Fracture of left humerus     non operative    Glaucoma     Heart failure (HCC)     Degenerated heart failure    Hypertension     Ill-defined condition 2000    DX WITH SARCODOSIS    Ill-defined condition     USES O2 AT 3;30AM TO 5 AM EVERDAY & AS NEEDED    Ill-defined condition 2016    HEP C    Other ill-defined conditions     Glaucoma    Sarcoid ([facility] [address]     Sarcoidosis ([facility] [address]     lung      Current Outpatient Prescriptions   Medication Sig Dispense    predniSONE (DELTASONE) 5 mg tablet Take 1 Tab by mouth daily (with breakfast). Indications: sarcoidosis 90 Tab    traMADol (ULTRAM) 50 mg tablet Take 1 Tab by mouth every eight (8) hours as needed for Pain. Max Daily Amount: 150 mg. 30 Tab    ketoconazole (NIZORAL) 2 % topical cream Apply  to affected area daily. Rash on neck, continue for 2 weeks after rash resolves 60 g    lidocaine (LIDODERM) 5 % 1 Patch by TransDERmal route daily as needed for Pain. Apply patch to the affected area for 12 hours a day and remove for 12 hours a day. 1 Package    losartan (COZAAR) 100 mg tablet Take 1 Tab by mouth daily. 90 Tab    carvedilol (COREG) 25 mg tablet Take 1 Tab by mouth two (2) times daily (with meals). Indications: Chronic Heart Failure, hypertension 180 Tab    lithium carbonate 150 mg capsule Take 150 mg by mouth two (2) times a day.      prednisoLONE acetate (PRED FORTE) 1 % ophthalmic suspension Administer 1 Drop to both eyes four (4) times daily. Indications: CATARACTS; SARCOIDOSIS     aspirin delayed-release 325 mg tablet Take 325 mg by mouth daily.  nitroglycerin (NITROSTAT) 0.4 mg SL tablet 1 Tab by SubLINGual route every five (5) minutes as needed for Chest Pain (Max dose of 3). 1 Bottle    ALPRAZolam (XANAX) 1 mg tablet Take 1 mg by mouth nightly. Takes 2 X day     potassium chloride SR 20 mEq TbER Take 20 mEq by mouth daily. 30 Tab     No current facility-administered medications for this visit. Reviewed PmHx, RxHx, FmHx, SocHx, AllgHx and updated and dated in the chart. ROS: Negative except for BOLD  General: fever, chills, fatigue  Respiratory: cough, SOB, wheezing  Cardiovascular:  CP, palpitation, LAW, edema   Gastrointestinal: N/V/D, bleeding  Genito-Urinary: dysuria, hematuria  Musculoskeletal: muscle weakness, pain, swelling    OBJECTIVE:   Visit Vitals    /67 (BP 1 Location: Right arm, BP Patient Position: Sitting)    Pulse 81    Temp 98.1 °F (36.7 °C) (Oral)    Resp 17    Ht 5' 11\" (1.803 m)    Wt 167 lb 4.8 oz (75.9 kg)    SpO2 93%    BMI 23.33 kg/m2     GEN: The patient appears well, alert, oriented x 3, in no distress. ENT: bilateral TM and canal normal.  Neck supple. No adenopathy or thyromegaly. HE. Left TMJ tenderness  Lungs: clear bilaterally, good air entry, no wheezes, rhonchi or rales. Cardiovascular: regular rate and rhythm. S1 and S2 normal, no murmurs,  Abdomen: + BS, soft without tenderness, guarding, rebound, mass or organomegaly. Extremities: no edema, normal peripheral pulses. Neurological: normal, gross sensory and motor in tact without focal findings. Derm hypopigmented rash on neck    Assessment/ Plan:       ICD-10-CM ICD-9-CM    1. Sarcoidosis ([facility] [address] D86.9 135 predniSONE (DELTASONE) 5 mg tablet   2.  Chronic low back pain with right-sided sciatica, unspecified back pain laterality M54.41 724.2 traMADol (ULTRAM) 50 mg tablet    G89.29 724.3 338. 29    3. Rash R21 782.1 ketoconazole (NIZORAL) 2 % topical cream   4. Arthralgia of left temporomandibular joint M26.622 524.62    5. Stress F43.9 V62.89      Use mouth guard to minimize TMJ    I have discussed the diagnosis with the patient and the intended plan as seen in the above orders. The patient has received an after-visit summary and questions were answered concerning future plans. Medication Side Effects and Warnings were discussed with patient. Follow-up Disposition:  Return in about 6 months (around 2/17/2018), or if symptoms worsen or fail to improve, for Blood Pressure.       [employer], MD

## 2017-08-17 NOTE — PATIENT INSTRUCTIONS
Temporomandibular Disorder: Care Instructions  Your Care Instructions    Temporomandibular (TM) disorders are a problem with the muscles and joints that connect your jaw to your skull. They cause pain when you open your mouth, chew, or yawn. You may feel this pain on one or both sides. TM disorders are often caused by tight jaw muscles. The tightness can be caused by clenching or grinding your teeth. This may happen when you have a lot of stress in your life. If you lower your stress, you may be able to stop clenching or grinding your teeth. This will help relax your jaw and reduce your pain. You may also be able to do some things at home to feel better. But if none of this works, your doctor may prescribe medicine to help relax your muscles and control the pain. Follow-up care is a key part of your treatment and safety. Be sure to make and go to all appointments, and call your doctor if you are having problems. It's also a good idea to know your test results and keep a list of the medicines you take. How can you care for yourself at home? · Put a warm, moist cloth or heating pad set on low on your jaw. Do this for 10 to 20 minutes at a time. Put a thin cloth between the heating pad and your skin. · Avoid hard or chewy foods that cause your jaws to work very hard. Examples include popcorn, jerky, tough meats, chewy breads, gum, and raw apples and carrots. · Choose softer foods that are easy to chew. These include eggs, yogurt, and soup. · Cut your food into small pieces. Chew slowly. · If your jaw gets too painful to chew, or if it locks, you may need to puree your food for a few days or weeks. · To relax your jaw, repeat this exercise for a few minutes every morning and evening. Watch yourself in a mirror. Gently open and close your mouth. Move your jaw straight up and down. But don't do this if it makes your pain worse.   · Get at least 30 minutes of exercise on most days of the week to relieve stress. Walking is a good choice. You also may want to do other activities, such as running, swimming, cycling, or playing tennis or team sports. · Do not:  ¨ Hold a phone between your shoulder and your jaw. ¨ Open your mouth all the way, like when you sing loudly or yawn. ¨ Clench or grind your teeth, bite your lips, or chew your fingernails. ¨ Clench things such as pens, pipes, or cigars between your teeth. When should you call for help? Call your doctor now or seek immediate medical care if:  · Your jaw is locked open or shut or it is hard to move your jaw. Watch closely for changes in your health, and be sure to contact your doctor if:  · Your jaw pain gets worse. · Your face is swollen. · You do not get better as expected. Where can you learn more? Go to http://marylin-chan.info/. Enter R132 in the search box to learn more about \"Temporomandibular Disorder: Care Instructions. \"  Current as of: August 9, 2016  Content Version: 11.3  © 9831-3977 [employer]. Care instructions adapted under license by [employer] (which disclaims liability or warranty for this information). If you have questions about a medical condition or this instruction, always ask your healthcare professional. [address] any warranty or liability for your use of this information.

## 2017-10-25 ENCOUNTER — OFFICE VISIT (OUTPATIENT)
Dept: INTERNAL MEDICINE CLINIC | Age: 52
End: 2017-10-25

## 2017-10-25 VITALS
TEMPERATURE: 97.8 F | BODY MASS INDEX: 23.83 KG/M2 | HEIGHT: 71 IN | SYSTOLIC BLOOD PRESSURE: 139 MMHG | DIASTOLIC BLOOD PRESSURE: 98 MMHG | OXYGEN SATURATION: 91 % | RESPIRATION RATE: 18 BRPM | HEART RATE: 77 BPM | WEIGHT: 170.2 LBS

## 2017-10-25 DIAGNOSIS — I15.0 RENOVASCULAR HYPERTENSION: Primary | ICD-10-CM

## 2017-10-25 DIAGNOSIS — D86.9 SARCOID: ICD-10-CM

## 2017-10-25 NOTE — PROGRESS NOTES
Mr. Belkis Harry is a 63y.o. year old male who had concerns including Hypertension and Cold Symptoms. HPI:  Chief Complaint   Patient presents with    Hypertension     patient states blood pressure has been good    Cold Symptoms     congestion x1 week, productive cough with clear colored mucus      Flu shot given 2 weeks ago-sx started afterwards. Pt on prednisone  BP Readings from Last 3 Encounters:   10/25/17 (!) 139/98   08/17/17 101/67   07/26/17 (!) 150/95       Past Medical History:   Diagnosis Date    Asthma     Bipolar disorder (HCC)     Bi-polar, Anxiety    Cataracts, bilateral     DDD (degenerative disc disease)     Fracture of left humerus     non operative    Glaucoma     Heart failure (HCC)     Degenerated heart failure    Hypertension     Ill-defined condition 2000    DX WITH SARCODOSIS    Ill-defined condition     USES O2 AT 3;30AM TO 5 AM EVERDAY & AS NEEDED    Ill-defined condition 2016    HEP C    Other ill-defined conditions(799.89)     Glaucoma    Sarcoid     Sarcoidosis     lung      Current Outpatient Prescriptions   Medication Sig Dispense    predniSONE (DELTASONE) 5 mg tablet Take 1 Tab by mouth daily (with breakfast). Indications: sarcoidosis 90 Tab    ketoconazole (NIZORAL) 2 % topical cream Apply  to affected area daily. Rash on neck, continue for 2 weeks after rash resolves 60 g    lidocaine (LIDODERM) 5 % 1 Patch by TransDERmal route daily as needed for Pain. Apply patch to the affected area for 12 hours a day and remove for 12 hours a day. 1 Package    losartan (COZAAR) 100 mg tablet Take 1 Tab by mouth daily. 90 Tab    carvedilol (COREG) 25 mg tablet Take 1 Tab by mouth two (2) times daily (with meals). Indications: Chronic Heart Failure, hypertension 180 Tab    lithium carbonate 150 mg capsule Take 150 mg by mouth two (2) times a day.  prednisoLONE acetate (PRED FORTE) 1 % ophthalmic suspension Administer 1 Drop to both eyes four (4) times daily. Indications: CATARACTS; SARCOIDOSIS     aspirin delayed-release 325 mg tablet Take 325 mg by mouth daily.  ALPRAZolam (XANAX) 1 mg tablet Take 1 mg by mouth nightly. Takes 2 X day     traMADol (ULTRAM) 50 mg tablet Take 1 Tab by mouth every eight (8) hours as needed for Pain. Max Daily Amount: 150 mg. 30 Tab    potassium chloride SR 20 mEq TbER Take 20 mEq by mouth daily. 30 Tab    nitroglycerin (NITROSTAT) 0.4 mg SL tablet 1 Tab by SubLINGual route every five (5) minutes as needed for Chest Pain (Max dose of 3). 1 Bottle     No current facility-administered medications for this visit. Reviewed PmHx, RxHx, FmHx, SocHx, AllgHx and updated and dated in the chart. ROS: Negative except for BOLD  General: fever, chills, fatigue  Respiratory: cough, SOB, wheezing  Cardiovascular:  CP, palpitation, LAW, edema   Gastrointestinal: N/V/D, bleeding  Genito-Urinary: dysuria, hematuria  Musculoskeletal: muscle weakness, pain, swelling    OBJECTIVE:   Visit Vitals    BP (!) 139/98 (BP 1 Location: Right arm, BP Patient Position: Sitting)    Pulse 77    Temp 97.8 °F (36.6 °C) (Oral)    Resp 18    Ht 5' 11\" (1.803 m)    Wt 170 lb 3.2 oz (77.2 kg)    SpO2 91%    BMI 23.74 kg/m2     GEN: The patient appears well, alert, oriented x 3, in no distress. ENT: bilateral TM and canal normal.  Neck supple. No adenopathy or thyromegaly. HE. Pharyngeal erythema, no pND  Lungs: clear bilaterally, good air entry, no wheezes, rhonchi or rales. Cardiovascular: regular rate and rhythm. S1 and S2 normal, no murmurs,  Abdomen: + BS, soft without tenderness, guarding, rebound, mass or organomegaly. Extremities: no edema, normal peripheral pulses. Neurological: normal, gross sensory and motor in tact without focal findings.     Results for orders placed or performed in visit on 05/30/17   HEPATITIS C AB   Result Value Ref Range    Hep C Virus Ab <0.1 0.0 - 0.9 s/co ratio   METABOLIC PANEL, BASIC   Result Value Ref Range    Glucose 81 65 - 99 mg/dL    BUN 13 8 - 27 mg/dL    Creatinine 1.16 0.76 - 1.27 mg/dL    GFR est non-AA 68 >59 mL/min/1.73    GFR est AA 79 >59 mL/min/1.73    BUN/Creatinine ratio 11 10 - 24    Sodium 145 (H) 134 - 144 mmol/L    Potassium 4.6 3.5 - 5.2 mmol/L    Chloride 101 96 - 106 mmol/L    CO2 22 18 - 29 mmol/L    Calcium 9.9 8.6 - 10.2 mg/dL   LITHIUM   Result Value Ref Range    Lithium level <0.1 (L) 0.6 - 1.2 mmol/L         Assessment/ Plan:       ICD-10-CM ICD-9-CM    1. Renovascular hypertension I15.0 939.37      Diastolic blood pressure elevated. Patient states this may be secondary to not sleeping. Symptomatic care. Patient is on prednisone low-dose which would lower his immune system and may be moderately symptomatic after 2 vaccines. No signs or symptoms strongly indicated for antibiotic use at this time. Patient will call back if he has worsening symptoms are not improving. I have discussed the diagnosis with the patient and the intended plan as seen in the above orders. The patient has received an after-visit summary and questions were answered concerning future plans. Medication Side Effects and Warnings were discussed with patient. Follow-up Disposition:  Return in about 6 months (around 4/25/2018) for Blood Pressure.       Som Lacey MD

## 2017-10-25 NOTE — PROGRESS NOTES
Chief Complaint   Patient presents with    Hypertension     patient states blood pressure has been good    Cold Symptoms     congestion x1 week, productive cough with clear colored mucus      1. Have you been to the ER, urgent care clinic since your last visit? Hospitalized since your last visit? No    2. Have you seen or consulted any other health care providers outside of the [address] [place] [address] since your last visit? Include any pap smears or colon screening. No       Patient did go to [address] [place] [address] specialist. Patient is here for BP follow up but is experiencing cold symptoms for more than 1 week. He said that he has been experiencing them since he received the flu shot.      Patient states that he received the shingles vaccine while at the pulmonologist

## 2017-11-09 ENCOUNTER — HOSPITAL ENCOUNTER (EMERGENCY)
Age: 52
Discharge: HOME | End: 2017-11-09
Attending: EMERGENCY MEDICINE
Payer: MEDICAID

## 2017-11-09 ENCOUNTER — APPOINTMENT (OUTPATIENT)
Dept: GENERAL RADIOLOGY | Age: 52
End: 2017-11-09
Attending: EMERGENCY MEDICINE
Payer: MEDICAID

## 2017-11-09 VITALS
HEART RATE: 84 BPM | WEIGHT: 173 LBS | RESPIRATION RATE: 20 BRPM | SYSTOLIC BLOOD PRESSURE: 154 MMHG | OXYGEN SATURATION: 98 % | TEMPERATURE: 97.8 F | DIASTOLIC BLOOD PRESSURE: 102 MMHG | HEIGHT: 71 IN | BODY MASS INDEX: 24.22 KG/M2

## 2017-11-09 DIAGNOSIS — R06.02 SHORTNESS OF BREATH: ICD-10-CM

## 2017-11-09 DIAGNOSIS — J41.8 MIXED SIMPLE AND MUCOPURULENT CHRONIC BRONCHITIS (HCC): Primary | ICD-10-CM

## 2017-11-09 LAB
ALBUMIN SERPL-MCNC: 3 G/DL (ref 3.5–5)
ALBUMIN/GLOB SERPL: 0.8 {RATIO} (ref 1.1–2.2)
ALP SERPL-CCNC: 387 U/L (ref 45–117)
ALT SERPL-CCNC: 174 U/L (ref 12–78)
ANION GAP SERPL CALC-SCNC: 13 MMOL/L (ref 5–15)
AST SERPL-CCNC: 79 U/L (ref 15–37)
BASOPHILS # BLD: 0 K/UL (ref 0–0.1)
BASOPHILS NFR BLD: 0 % (ref 0–1)
BILIRUB SERPL-MCNC: 1 MG/DL (ref 0.2–1)
BNP SERPL-MCNC: 506 PG/ML (ref 0–125)
BUN SERPL-MCNC: 15 MG/DL (ref 6–20)
BUN/CREAT SERPL: 13 (ref 12–20)
CALCIUM SERPL-MCNC: 8.4 MG/DL (ref 8.5–10.1)
CHLORIDE SERPL-SCNC: 105 MMOL/L (ref 97–108)
CO2 SERPL-SCNC: 23 MMOL/L (ref 21–32)
CREAT SERPL-MCNC: 1.19 MG/DL (ref 0.7–1.3)
D DIMER PPP FEU-MCNC: 0.41 MG/L FEU (ref 0–0.65)
EOSINOPHIL # BLD: 0 K/UL (ref 0–0.4)
EOSINOPHIL NFR BLD: 0 % (ref 0–7)
ERYTHROCYTE [DISTWIDTH] IN BLOOD BY AUTOMATED COUNT: 16.5 % (ref 11.5–14.5)
GLOBULIN SER CALC-MCNC: 3.7 G/DL (ref 2–4)
GLUCOSE SERPL-MCNC: 132 MG/DL (ref 65–100)
HCT VFR BLD AUTO: 47 % (ref 36.6–50.3)
HGB BLD-MCNC: 15.9 G/DL (ref 12.1–17)
INR PPP: 0.9 (ref 0.9–1.1)
LYMPHOCYTES # BLD: 1.9 K/UL (ref 0.8–3.5)
LYMPHOCYTES NFR BLD: 15 % (ref 12–49)
MCH RBC QN AUTO: 30 PG (ref 26–34)
MCHC RBC AUTO-ENTMCNC: 33.8 G/DL (ref 30–36.5)
MCV RBC AUTO: 88.7 FL (ref 80–99)
MONOCYTES # BLD: 0.9 K/UL (ref 0–1)
MONOCYTES NFR BLD: 7 % (ref 5–13)
NEUTS SEG # BLD: 10.3 K/UL (ref 1.8–8)
NEUTS SEG NFR BLD: 78 % (ref 32–75)
PLATELET # BLD AUTO: 183 K/UL (ref 150–400)
POTASSIUM SERPL-SCNC: 3.5 MMOL/L (ref 3.5–5.1)
PROT SERPL-MCNC: 6.7 G/DL (ref 6.4–8.2)
PROTHROMBIN TIME: 9.1 SEC (ref 9–11.1)
RBC # BLD AUTO: 5.3 M/UL (ref 4.1–5.7)
SODIUM SERPL-SCNC: 141 MMOL/L (ref 136–145)
TROPONIN I BLD-MCNC: <0.04 NG/ML (ref 0–0.08)
WBC # BLD AUTO: 13.2 K/UL (ref 4.1–11.1)

## 2017-11-09 PROCEDURE — 84484 ASSAY OF TROPONIN QUANT: CPT

## 2017-11-09 PROCEDURE — 85610 PROTHROMBIN TIME: CPT | Performed by: EMERGENCY MEDICINE

## 2017-11-09 PROCEDURE — 99285 EMERGENCY DEPT VISIT HI MDM: CPT

## 2017-11-09 PROCEDURE — 93005 ELECTROCARDIOGRAM TRACING: CPT

## 2017-11-09 PROCEDURE — 85025 COMPLETE CBC W/AUTO DIFF WBC: CPT | Performed by: EMERGENCY MEDICINE

## 2017-11-09 PROCEDURE — 36415 COLL VENOUS BLD VENIPUNCTURE: CPT | Performed by: EMERGENCY MEDICINE

## 2017-11-09 PROCEDURE — 80053 COMPREHEN METABOLIC PANEL: CPT | Performed by: EMERGENCY MEDICINE

## 2017-11-09 PROCEDURE — 83880 ASSAY OF NATRIURETIC PEPTIDE: CPT | Performed by: EMERGENCY MEDICINE

## 2017-11-09 PROCEDURE — 71010 XR CHEST PORT: CPT

## 2017-11-09 PROCEDURE — 85379 FIBRIN DEGRADATION QUANT: CPT | Performed by: EMERGENCY MEDICINE

## 2017-11-09 RX ORDER — FUROSEMIDE 20 MG/1
20 TABLET ORAL DAILY
Qty: 3 TAB | Refills: 0 | Status: SHIPPED | OUTPATIENT
Start: 2017-11-09 | End: 2017-11-16 | Stop reason: SDUPTHER

## 2017-11-09 RX ORDER — GUAIFENESIN 100 MG/5ML
325 LIQUID (ML) ORAL
Status: DISCONTINUED | OUTPATIENT
Start: 2017-11-09 | End: 2017-11-09

## 2017-11-09 RX ORDER — AZITHROMYCIN 250 MG/1
TABLET, FILM COATED ORAL
Qty: 6 TAB | Refills: 0 | Status: SHIPPED | OUTPATIENT
Start: 2017-11-09 | End: 2018-02-20 | Stop reason: ALTCHOICE

## 2017-11-09 NOTE — ED NOTES
Plan of care discussed with pt. Pt reported that his SOB was better with oxygen, able to talk in full sentences with no issues noted. No si/s of acute distress. Call bell within reach.

## 2017-11-09 NOTE — DISCHARGE INSTRUCTIONS
Shortness of Breath: Care Instructions  Your Care Instructions  Shortness of breath has many causes. Sometimes conditions such as anxiety can lead to shortness of breath. Some people get mild shortness of breath when they exercise. Trouble breathing also can be a symptom of a serious problem, such as asthma, lung disease, emphysema, heart problems, and pneumonia. If your shortness of breath continues, you may need tests and treatment. Watch for any changes in your breathing and other symptoms. Follow-up care is a key part of your treatment and safety. Be sure to make and go to all appointments, and call your doctor if you are having problems. It's also a good idea to know your test results and keep a list of the medicines you take. How can you care for yourself at home? · Do not smoke or allow others to smoke around you. If you need help quitting, talk to your doctor about stop-smoking programs and medicines. These can increase your chances of quitting for good. · Get plenty of rest and sleep. · Take your medicines exactly as prescribed. Call your doctor if you think you are having a problem with your medicine. · Find healthy ways to deal with stress. ¨ Exercise daily. ¨ Get plenty of sleep. ¨ Eat regularly and well. When should you call for help? Call 911 anytime you think you may need emergency care. For example, call if:  ? · You have severe shortness of breath. ? · You have symptoms of a heart attack. These may include:  ¨ Chest pain or pressure, or a strange feeling in the chest.  ¨ Sweating. ¨ Shortness of breath. ¨ Nausea or vomiting. ¨ Pain, pressure, or a strange feeling in the back, neck, jaw, or upper belly or in one or both shoulders or arms. ¨ Lightheadedness or sudden weakness. ¨ A fast or irregular heartbeat. After you call 911, the  may tell you to chew 1 adult-strength or 2 to 4 low-dose aspirin. Wait for an ambulance. Do not try to drive yourself.    ?Call your doctor now or seek immediate medical care if:  ? · Your shortness of breath gets worse or you start to wheeze. Wheezing is a high-pitched sound when you breathe. ? · You wake up at night out of breath or have to prop your head up on several pillows to breathe. ? · You are short of breath after only light activity or while at rest.   ? Watch closely for changes in your health, and be sure to contact your doctor if:  ? · You do not get better over the next 1 to 2 days. Where can you learn more? Go to http://marylin-chan.info/. Enter S780 in the search box to learn more about \"Shortness of Breath: Care Instructions. \"  Current as of: May 12, 2017  Content Version: 11.4  © 1296-9885 [employer]. Care instructions adapted under license by [employer] (which disclaims liability or warranty for this information). If you have questions about a medical condition or this instruction, always ask your healthcare professional. [place] [address] any warranty or liability for your use of this information. Heart Failure: Care Instructions  Your Care Instructions    Heart failure occurs when your heart does not pump as much blood as the body needs. Failure does not mean that the heart has stopped pumping but rather that it is not pumping as well as it should. Over time, this causes fluid buildup in your lungs and other parts of your body. Fluid buildup can cause shortness of breath, fatigue, swollen ankles, and other problems. By taking medicines regularly, reducing sodium (salt) in your diet, checking your weight every day, and making lifestyle changes, you can feel better and live longer. Follow-up care is a key part of your treatment and safety. Be sure to make and go to all appointments, and call your doctor if you are having problems. It's also a good idea to know your test results and keep a list of the medicines you take.   How can you care for yourself at home? Medicines  ? · Be safe with medicines. Take your medicines exactly as prescribed. Call your doctor if you think you are having a problem with your medicine. ? · Do not take any vitamins, over-the-counter medicine, or herbal products without talking to your doctor first. Rainer Needles not take ibuprofen (Advil or Motrin) and naproxen (Aleve) without talking to your doctor first. They could make your heart failure worse. ? · You may be taking some of the following medicine. ¨ Beta-blockers can slow heart rate, decrease blood pressure, and improve your condition. Taking a beta-blocker may lower your chance of needing to be hospitalized. ¨ Angiotensin-converting enzyme inhibitors (ACEIs) reduce the heart's workload, lower blood pressure, and reduce swelling. Taking an ACEI may lower your chance of needing to be hospitalized again. ¨ Angiotensin II receptor blockers (ARBs) work like ACEIs. Your doctor may prescribe them instead of ACEIs. ¨ Diuretics, also called water pills, reduce swelling. ¨ Potassium supplements replace this important mineral, which is sometimes lost with diuretics. ¨ Aspirin and other blood thinners prevent blood clots, which can cause a stroke or heart attack. ? You will get more details on the specific medicines your doctor prescribes. Diet  ? · Your doctor may suggest that you limit sodium to 2,000 milligrams (mg) a day or less. That is less than 1 teaspoon of salt a day, including all the salt you eat in cooking or in packaged foods. People get most of their sodium from processed foods. Fast food and restaurant meals also tend to be very high in sodium. ? · Ask your doctor how much liquid you can drink each day. You may have to limit liquids. ?Weight  ? · Weigh yourself without clothing at the same time each day. Record your weight. Call your doctor if you have a sudden weight gain, such as more than 2 to 3 pounds in a day or 5 pounds in a week.  (Your doctor may suggest a different range of weight gain.) A sudden weight gain may mean that your heart failure is getting worse. ? Activity level  ? · Start light exercise (if your doctor says it is okay). Even if you can only do a small amount, exercise will help you get stronger, have more energy, and manage your weight and your stress. Walking is an easy way to get exercise. Start out by walking a little more than you did before. Bit by bit, increase the amount you walk. ? · When you exercise, watch for signs that your heart is working too hard. You are pushing yourself too hard if you cannot talk while you are exercising. If you become short of breath or dizzy or have chest pain, stop, sit down, and rest.   ? · If you feel \"wiped out\" the day after you exercise, walk slower or for a shorter distance until you can work up to a better pace. ? · Get enough rest at night. Sleeping with 1 or 2 pillows under your upper body and head may help you breathe easier. ? Lifestyle changes  ? · Do not smoke. Smoking can make a heart condition worse. If you need help quitting, talk to your doctor about stop-smoking programs and medicines. These can increase your chances of quitting for good. Quitting smoking may be the most important step you can take to protect your heart. ? · Limit alcohol to 2 drinks a day for men and 1 drink a day for women. Too much alcohol can cause health problems. ? · Avoid getting sick from colds and the flu. Get a pneumococcal vaccine shot. If you have had one before, ask your doctor whether you need another dose. Get a flu shot each year. If you must be around people with colds or the flu, wash your hands often. When should you call for help? Call 911 if you have symptoms of sudden heart failure such as:  ? · You have severe trouble breathing. ? · You cough up pink, foamy mucus. ? · You have a new irregular or rapid heartbeat.    ?Call your doctor now or seek immediate medical care if:  ? · You have new or increased shortness of breath. ? · You are dizzy or lightheaded, or you feel like you may faint. ? · You have sudden weight gain, such as more than 2 to 3 pounds in a day or 5 pounds in a week. (Your doctor may suggest a different range of weight gain.)   ? · You have increased swelling in your legs, ankles, or feet. ? · You are suddenly so tired or weak that you cannot do your usual activities. ? Watch closely for changes in your health, and be sure to contact your doctor if you develop new symptoms. Where can you learn more? Go to http://marylin-chan.info/. Enter P638 in the search box to learn more about \"Heart Failure: Care Instructions. \"  Current as of: September 21, 2016  Content Version: 11.4  © 4559-2905 [employer]. Care instructions adapted under license by [employer] (which disclaims liability or warranty for this information). If you have questions about a medical condition or this instruction, always ask your healthcare professional. [place] [address] any warranty or liability for your use of this information. Bronchitis: Care Instructions  Your Care Instructions    Bronchitis is inflammation of the bronchial tubes, which carry air to the lungs. The tubes swell and produce mucus, or phlegm. The mucus and inflamed bronchial tubes make you cough. You may have trouble breathing. Most cases of bronchitis are caused by viruses like those that cause colds. Antibiotics usually do not help and they may be harmful. Bronchitis usually develops rapidly and lasts about 2 to 3 weeks in otherwise healthy people. Follow-up care is a key part of your treatment and safety. Be sure to make and go to all appointments, and call your doctor if you are having problems. It's also a good idea to know your test results and keep a list of the medicines you take. How can you care for yourself at home? · Take all medicines exactly as prescribed.  Call your doctor if you think you are having a problem with your medicine. · Get some extra rest.  · Take an over-the-counter pain medicine, such as acetaminophen (Tylenol), ibuprofen (Advil, Motrin), or naproxen (Aleve) to reduce fever and relieve body aches. Read and follow all instructions on the label. · Do not take two or more pain medicines at the same time unless the doctor told you to. Many pain medicines have acetaminophen, which is Tylenol. Too much acetaminophen (Tylenol) can be harmful. · Take an over-the-counter cough medicine that contains dextromethorphan to help quiet a dry, hacking cough so that you can sleep. Avoid cough medicines that have more than one active ingredient. Read and follow all instructions on the label. · Breathe moist air from a humidifier, hot shower, or sink filled with hot water. The heat and moisture will thin mucus so you can cough it out. · Do not smoke. Smoking can make bronchitis worse. If you need help quitting, talk to your doctor about stop-smoking programs and medicines. These can increase your chances of quitting for good. When should you call for help? Call 911 anytime you think you may need emergency care. For example, call if:  ? · You have severe trouble breathing. ?Call your doctor now or seek immediate medical care if:  ? · You have new or worse trouble breathing. ? · You cough up dark brown or bloody mucus (sputum). ? · You have a new or higher fever. ? · You have a new rash. ? Watch closely for changes in your health, and be sure to contact your doctor if:  ? · You cough more deeply or more often, especially if you notice more mucus or a change in the color of your mucus. ? · You are not getting better as expected. Where can you learn more? Go to http://marylin-chan.info/. Enter H333 in the search box to learn more about \"Bronchitis: Care Instructions. \"  Current as of:  May 12, 2017  Content Version: 11.4  © 7792-0379 [employer]. Care instructions adapted under license by [employer] (which disclaims liability or warranty for this information). If you have questions about a medical condition or this instruction, always ask your healthcare professional. [address] any warranty or liability for your use of this information.

## 2017-11-09 NOTE — ED NOTES

## 2017-11-09 NOTE — ED TRIAGE NOTES
Shortness of breath x 2 days, cough, pt is on home O2 for sarcoidosis, home oxygen ran out last night, new tank to be delivered today. Pt states, \"the last time I felt like this I had a slight heart attack. \"

## 2017-11-09 NOTE — PROGRESS NOTES
CM reviewed chart due to patient having Core Measures Diagnosis. Patient presented to ER for SOB and Back pain. Patient PCP on file is Monik Adams MD. Patient next appointment with this provider is on 4/25/2018. CM has scheduled a follow up appointment with this PCP on 11/16/2017 at 10:30am. Patient has [facility] Medicaid. Patient is still being evaluated. Will continue to follow for further discharge planning.       Alexys Portillo RN  [phone]

## 2017-11-09 NOTE — ED NOTES
Pt reported that his SOB had resolved but \"my back still hurts. \" dr Erica Sandy md, was notified. No si/s of acute distress. Call bell within reach. Nonverbal pain of 0/10.

## 2017-11-09 NOTE — ED NOTES
....Discharge summary and discharge medications reviewed with patient and appropriate educational materials and side effects teaching were provided. patient  Given 0 paper prescriptions and 2 electronic prescriptions sent to pt's listed pharmacy. Patient (s) verbalized understanding of the importance of discussing medications with his or her physician or clinic they will be following up with. No si/s of acute distress prior to discharge. Patient offered wheelchair from treatment area to hospital entrance, patient refused wheelchair. Pt reported 4/10 back pain, tolerable for discharge. No other pt complaints. Pt discharged with a steady gait with no si/s of acute distress. Pt reported \"i'm meeting my daughter at the pharmacy and she is taking me with my home oxygen. \"
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
## 2017-11-09 NOTE — ED NOTES
Orders clarified with dr Jesse Minor md. He was reported that pt reported that he already took 325mg of aspirin this AM as part of his daily medications. Coy Irizarry for 1 ekg at present per md. Orders changed.

## 2017-11-09 NOTE — ED NOTES
Pt reported that his daughter could pick him up and that his home oxygen had already been delivered.

## 2017-11-10 LAB
ATRIAL RATE: 78 BPM
CALCULATED P AXIS, ECG09: 57 DEGREES
CALCULATED R AXIS, ECG10: 74 DEGREES
CALCULATED T AXIS, ECG11: -8 DEGREES
DIAGNOSIS, 93000: NORMAL
P-R INTERVAL, ECG05: 126 MS
Q-T INTERVAL, ECG07: 386 MS
QRS DURATION, ECG06: 82 MS
QTC CALCULATION (BEZET), ECG08: 440 MS
VENTRICULAR RATE, ECG03: 78 BPM

## 2017-11-16 ENCOUNTER — OFFICE VISIT (OUTPATIENT)
Dept: INTERNAL MEDICINE CLINIC | Age: 52
End: 2017-11-16

## 2017-11-16 VITALS
WEIGHT: 173.7 LBS | HEIGHT: 71 IN | RESPIRATION RATE: 20 BRPM | BODY MASS INDEX: 24.32 KG/M2 | SYSTOLIC BLOOD PRESSURE: 113 MMHG | DIASTOLIC BLOOD PRESSURE: 80 MMHG | OXYGEN SATURATION: 90 % | TEMPERATURE: 98.3 F | HEART RATE: 76 BPM

## 2017-11-16 DIAGNOSIS — I50.9 ACUTE ON CHRONIC CONGESTIVE HEART FAILURE, UNSPECIFIED CONGESTIVE HEART FAILURE TYPE: Primary | ICD-10-CM

## 2017-11-16 DIAGNOSIS — D86.9 SARCOIDOSIS: ICD-10-CM

## 2017-11-16 DIAGNOSIS — R06.02 SOB (SHORTNESS OF BREATH): ICD-10-CM

## 2017-11-16 RX ORDER — FUROSEMIDE 20 MG/1
20 TABLET ORAL 2 TIMES DAILY
Qty: 60 TAB | Refills: 0 | Status: SHIPPED | OUTPATIENT
Start: 2017-11-16

## 2017-11-16 RX ORDER — PREDNISONE 5 MG/1
TABLET ORAL
Qty: 180 TAB | Refills: 4 | Status: SHIPPED | OUTPATIENT
Start: 2017-11-16 | End: 2018-06-14 | Stop reason: SDUPTHER

## 2017-11-16 NOTE — PROGRESS NOTES
Chief Complaint   Patient presents with   [facility] Follow Up     rm 4 patient is here to follow up with Dr. Fidencio Alexandra after an ER visit for SOB     Back Pain     complains of back pain    Cough     complains of constant coughing      1. Have you been to the ER, urgent care clinic since your last visit? Hospitalized since your last visit? Yes When: 11/9/17 Where: [facility] Reason for visit: SOB     2. Have you seen or consulted any other health care providers outside of the [address] [place] [address] since your last visit? Include any pap smears or colon screening.  No

## 2017-11-16 NOTE — PROGRESS NOTES
Mr. Jonathan Rosas is a 63y.o. year old male who had concerns including Hospital Follow Up; Back Pain; and Cough. HPI:  Chief Complaint   Patient presents with   [facility] Follow Up     rm 4 patient is here to follow up with Dr. Tigre Bates after an ER visit for SOB     Back Pain     complains of back pain    Cough     complains of constant coughing      Acute heart failure- TID lasix 20 mg. Wt Readings from Last 3 Encounters:   11/16/17 173 lb 11.2 oz (78.8 kg)   11/09/17 173 lb (78.5 kg)   10/25/17 170 lb 3.2 oz (77.2 kg)       Past Medical History:   Diagnosis Date    Asthma     Bipolar disorder (HCC)     Bi-polar, Anxiety    Cataracts, bilateral     DDD (degenerative disc disease)     Fracture of left humerus     non operative    Glaucoma     Heart failure (HCC)     Degenerated heart failure    Hypertension     Ill-defined condition 2000    DX WITH SARCODOSIS    Ill-defined condition     USES O2 AT 3;30AM TO 5 AM EVERDAY & AS NEEDED    Ill-defined condition 2016    HEP C    Other ill-defined conditions(799.89)     Glaucoma    Sarcoid     Sarcoidosis     lung      Current Outpatient Prescriptions   Medication Sig Dispense    predniSONE (DELTASONE) 5 mg tablet 30 mg daily  Indications: sarcoidosis 180 Tab    furosemide (LASIX) 20 mg tablet Take 1 Tab by mouth two (2) times a day. 60 Tab    azithromycin (ZITHROMAX Z-SUSHIL) 250 mg tablet Take two tablets today then one tablet daily 6 Tab    traMADol (ULTRAM) 50 mg tablet Take 1 Tab by mouth every eight (8) hours as needed for Pain. Max Daily Amount: 150 mg. 30 Tab    ketoconazole (NIZORAL) 2 % topical cream Apply  to affected area daily. Rash on neck, continue for 2 weeks after rash resolves 60 g    lidocaine (LIDODERM) 5 % 1 Patch by TransDERmal route daily as needed for Pain. Apply patch to the affected area for 12 hours a day and remove for 12 hours a day. 1 Package    losartan (COZAAR) 100 mg tablet Take 1 Tab by mouth daily.  90 Tab    carvedilol (COREG) 25 mg tablet Take 1 Tab by mouth two (2) times daily (with meals). Indications: Chronic Heart Failure, hypertension 180 Tab    potassium chloride SR 20 mEq TbER Take 20 mEq by mouth daily. 30 Tab    lithium carbonate 150 mg capsule Take 150 mg by mouth two (2) times a day.  prednisoLONE acetate (PRED FORTE) 1 % ophthalmic suspension Administer 1 Drop to both eyes three (3) times daily. Indications: CATARACTS; SARCOIDOSIS     aspirin delayed-release 325 mg tablet Take 325 mg by mouth daily.  nitroglycerin (NITROSTAT) 0.4 mg SL tablet 1 Tab by SubLINGual route every five (5) minutes as needed for Chest Pain (Max dose of 3). 1 Bottle    ALPRAZolam (XANAX) 1 mg tablet Take 1 mg by mouth nightly. Takes 2 X day      No current facility-administered medications for this visit. Reviewed PmHx, RxHx, FmHx, SocHx, AllgHx and updated and dated in the chart. ROS: Negative except for BOLD  General: fever, chills, fatigue  Respiratory: cough, SOB, wheezing  Cardiovascular:  CP, palpitation, LAW, edema   Gastrointestinal: N/V/D, bleeding  Genito-Urinary: dysuria, hematuria  Musculoskeletal: muscle weakness, pain, swelling    OBJECTIVE:   Visit Vitals    /80 (BP 1 Location: Right arm, BP Patient Position: Sitting)    Pulse 76    Temp 98.3 °F (36.8 °C) (Oral)    Resp 20    Ht 5' 11\" (1.803 m)    Wt 173 lb 11.2 oz (78.8 kg)    SpO2 90%     L/min    BMI 24.23 kg/m2     GEN: The patient appears well, alert, oriented x 3, in no distress. ENT: bilateral TM and canal normal.  Neck supple. No adenopathy or thyromegaly. HE. Lungs: clear bilaterally, good air entry, no wheezes, rhonchi or rales. Cardiovascular: regular rate and rhythm. S1 and S2 normal, no murmurs,  Abdomen: + BS, soft without tenderness, guarding, rebound, mass or organomegaly. Extremities: no edema, normal peripheral pulses.    Neurological: normal, gross sensory and motor in tact without focal findings. Results for orders placed or performed during the hospital encounter of 11/09/17   CBC WITH AUTOMATED DIFF   Result Value Ref Range    WBC 13.2 (H) 4.1 - 11.1 K/uL    RBC 5.30 4. 10 - 5.70 M/uL    HGB 15.9 12.1 - 17.0 g/dL    HCT 47.0 36.6 - 50.3 %    MCV 88.7 80.0 - 99.0 FL    MCH 30.0 26.0 - 34.0 PG    MCHC 33.8 30.0 - 36.5 g/dL    RDW 16.5 (H) 11.5 - 14.5 %    PLATELET 180 310 - 736 K/uL    NEUTROPHILS 78 (H) 32 - 75 %    LYMPHOCYTES 15 12 - 49 %    MONOCYTES 7 5 - 13 %    EOSINOPHILS 0 0 - 7 %    BASOPHILS 0 0 - 1 %    ABS. NEUTROPHILS 10.3 (H) 1.8 - 8.0 K/UL    ABS. LYMPHOCYTES 1.9 0.8 - 3.5 K/UL    ABS. MONOCYTES 0.9 0.0 - 1.0 K/UL    ABS. EOSINOPHILS 0.0 0.0 - 0.4 K/UL    ABS. BASOPHILS 0.0 0.0 - 0.1 K/UL   PROTHROMBIN TIME + INR   Result Value Ref Range    INR 0.9 0.9 - 1.1      Prothrombin time 9.1 9.0 - 11.1 sec   D DIMER   Result Value Ref Range    D-dimer 0.41 0.00 - 0.65 mg/L FEU   METABOLIC PANEL, COMPREHENSIVE   Result Value Ref Range    Sodium 141 136 - 145 mmol/L    Potassium 3.5 3.5 - 5.1 mmol/L    Chloride 105 97 - 108 mmol/L    CO2 23 21 - 32 mmol/L    Anion gap 13 5 - 15 mmol/L    Glucose 132 (H) 65 - 100 mg/dL    BUN 15 6 - 20 MG/DL    Creatinine 1.19 0.70 - 1.30 MG/DL    BUN/Creatinine ratio 13 12 - 20      GFR est AA >60 >60 ml/min/1.73m2    GFR est non-AA >60 >60 ml/min/1.73m2    Calcium 8.4 (L) 8.5 - 10.1 MG/DL    Bilirubin, total 1.0 0.2 - 1.0 MG/DL    ALT (SGPT) 174 (H) 12 - 78 U/L    AST (SGOT) 79 (H) 15 - 37 U/L    Alk.  phosphatase 387 (H) 45 - 117 U/L    Protein, total 6.7 6.4 - 8.2 g/dL    Albumin 3.0 (L) 3.5 - 5.0 g/dL    Globulin 3.7 2.0 - 4.0 g/dL    A-G Ratio 0.8 (L) 1.1 - 2.2     NT-PRO BNP   Result Value Ref Range    NT pro- (H) 0 - 125 PG/ML   POC TROPONIN-I   Result Value Ref Range    Troponin-I (POC) <0.04 0.00 - 0.08 ng/mL   EKG, 12 LEAD, INITIAL   Result Value Ref Range    Ventricular Rate 78 BPM    Atrial Rate 78 BPM    P-R Interval 126 ms    QRS Duration 82 ms    Q-T Interval 386 ms    QTC Calculation (Bezet) 440 ms    Calculated P Axis 57 degrees    Calculated R Axis 74 degrees    Calculated T Axis -8 degrees    Diagnosis       Normal sinus rhythm  Possible Left atrial enlargement  Nonspecific ST and T wave abnormality  When compared with ECG of 06-JUN-2016 10:01,  premature ventricular complexes are no longer present  Questionable change in QRS axis  Nonspecific T wave abnormality has replaced inverted T waves in Lateral leads  QT has shortened  Confirmed by Casper Del Toro ([id number]) on 11/10/2017 10:06:19 AM             Assessment/ Plan:       ICD-10-CM ICD-9-CM    1. Acute on chronic congestive heart failure, unspecified congestive heart failure type (HCC) I50.9 428.0    2. SOB (shortness of breath) R06.02 786.05 furosemide (LASIX) 20 mg tablet   3. Sarcoidosis D86.9 135 predniSONE (DELTASONE) 5 mg tablet       Increase to 60 prednison x 3  Days, then 40 x 3 days then 30 daily per pulmonary    I have discussed the diagnosis with the patient and the intended plan as seen in the above orders. The patient has received an after-visit summary and questions were answered concerning future plans. Medication Side Effects and Warnings were discussed with patient. Follow-up Disposition:  Return in about 4 days (around 11/20/2017) for Weight check, CHF follow up.       Yuan Cardoso MD

## 2017-11-24 ENCOUNTER — CLINICAL SUPPORT (OUTPATIENT)
Dept: INTERNAL MEDICINE CLINIC | Age: 52
End: 2017-11-24

## 2017-11-29 ENCOUNTER — OFFICE VISIT (OUTPATIENT)
Dept: INTERNAL MEDICINE CLINIC | Age: 52
End: 2017-11-29

## 2017-11-29 VITALS
DIASTOLIC BLOOD PRESSURE: 100 MMHG | HEIGHT: 71 IN | HEART RATE: 69 BPM | OXYGEN SATURATION: 90 % | BODY MASS INDEX: 24.57 KG/M2 | SYSTOLIC BLOOD PRESSURE: 150 MMHG | TEMPERATURE: 97.9 F | RESPIRATION RATE: 14 BRPM | WEIGHT: 175.5 LBS

## 2017-11-29 DIAGNOSIS — D86.9 SARCOIDOSIS: ICD-10-CM

## 2017-11-29 DIAGNOSIS — R79.81 LOW OXYGEN SATURATION: ICD-10-CM

## 2017-11-29 DIAGNOSIS — Z99.81 HISTORY OF HOME OXYGEN THERAPY: ICD-10-CM

## 2017-11-29 DIAGNOSIS — I10 ELEVATED BLOOD PRESSURE READING IN OFFICE WITH DIAGNOSIS OF HYPERTENSION: Primary | ICD-10-CM

## 2017-11-29 NOTE — MR AVS SNAPSHOT
Visit Information Date & Time Provider Department Dept. Phone Encounter #  
 11/29/2017 10:15 AM Aida Robles MD [facility] and [place] [address] [id number] Follow-up Instructions Return in about 3 months (around 2/19/2018), or if symptoms worsen or fail to improve, for Blood Pressure. Follow-up and Disposition History Your Appointments 12/6/2017  1:00 PM  
ESTABLISHED PATIENT with Mikhail George MD  
[facility] Appt Note: 6 month follow up  
 [address] [facility] [address] [facility] [address]  
[address] [facility] [address]  
  
    
 4/25/2018  9:00 AM  
Any with Aida Robles MD  
[address] [place] [address] and Primary Care [facility]) Appt Note: follow up  
 [address] [facility]  
  
   
 [address] [id number] Upcoming Health Maintenance Date Due Pneumococcal 19-64 Medium Risk (1 of 1 - PPSV23) 1/25/2018* FOBT Q 1 YEAR AGE 50-75 1/25/2018* DTaP/Tdap/Td series (2 - Td) 10/25/2027 *Topic was postponed. The date shown is not the original due date. Allergies as of 11/29/2017  Review Complete On: 11/29/2017 By: Diya Tanner Severity Noted Reaction Type Reactions Pcn [Penicillins] High 01/18/2016    Anaphylaxis Shellfish Derived High 03/01/2016    Anaphylaxis Iodine  04/28/2010    Hives, Swelling Current Immunizations  Reviewed on 1/3/2016 Name Date Influenza Vaccine Whole 11/28/2009 TD Vaccine 12/17/2009 Not reviewed this visit You Were Diagnosed With   
  
 Codes Comments Elevated blood pressure reading in office with diagnosis of hypertension    -  Primary ICD-10-CM: I10 
ICD-9-CM: 401.9 Sarcoidosis     ICD-10-CM: D86.9 ICD-9-CM: 135 Low oxygen saturation     ICD-10-CM: R79.81 ICD-9-CM: 790.91   
 History of home oxygen therapy     ICD-10-CM: Z99.81 ICD-9-CM: V46.2 Vitals BP Pulse Temp Resp Height(growth percentile) Weight(growth percentile) (!) 150/100 (BP 1 Location: Right arm, BP Patient Position: Sitting) 69 97.9 °F (36.6 °C) (Oral) 14 5' 11\" (1.803 m) 175 lb 8 oz (79.6 kg) SpO2 BMI Smoking Status 90% 24.48 kg/m2 Former Smoker Vitals History BMI and BSA Data Body Mass Index Body Surface Area  
 24.48 kg/m 2 2 m 2 Preferred Pharmacy Pharmacy Name Phone [email or web address], [address] [phone] Your Updated Medication List  
  
   
This list is accurate as of: 11/29/17 12:10 PM.  Always use your most recent med list.  
  
  
  
  
 ALPRAZolam 1 mg tablet Commonly known as:  Willaim Mac Take 1 mg by mouth nightly. Takes 2 X day  
  
 aspirin delayed-release 325 mg tablet Take 325 mg by mouth daily. azithromycin 250 mg tablet Commonly known as:  Hilaria Conway Take two tablets today then one tablet daily  
  
 carvedilol 25 mg tablet Commonly known as:  Geanro Gut Take 1 Tab by mouth two (2) times daily (with meals). Indications: Chronic Heart Failure, hypertension  
  
 furosemide 20 mg tablet Commonly known as:  LASIX Take 1 Tab by mouth two (2) times a day.  
  
 ketoconazole 2 % topical cream  
Commonly known as:  NIZORAL Apply  to affected area daily. Rash on neck, continue for 2 weeks after rash resolves  
  
 lidocaine 5 % Commonly known as:  LIDODERM  
1 Patch by TransDERmal route daily as needed for Pain. Apply patch to the affected area for 12 hours a day and remove for 12 hours a day. lithium carbonate 150 mg capsule Take 150 mg by mouth two (2) times a day. losartan 100 mg tablet Commonly known as:  COZAAR Take 1 Tab by mouth daily. nitroglycerin 0.4 mg SL tablet Commonly known as:  NITROSTAT 1 Tab by SubLINGual route every five (5) minutes as needed for Chest Pain (Max dose of 3). potassium chloride SR 20 mEq tablet Commonly known as:  K-TAB Take 20 mEq by mouth daily. prednisoLONE acetate 1 % ophthalmic suspension Commonly known as:  PRED FORTE Administer 1 Drop to both eyes three (3) times daily. Indications: CATARACTS; SARCOIDOSIS  
  
 predniSONE 5 mg tablet Commonly known as:  DELTASONE  
30 mg daily  Indications: sarcoidosis  
  
 traMADol 50 mg tablet Commonly known as:  ULTRAM  
Take 1 Tab by mouth every eight (8) hours as needed for Pain. Max Daily Amount: 150 mg. Follow-up Instructions Return in about 3 months (around 2/19/2018), or if symptoms worsen or fail to improve, for Blood Pressure. Introducing [facility]! Mandeep Cosme introduces Web[employer]unch patient portal. Now you can access parts of your medical record, email your doctor's office, and request medication refills online. 1. In your internet browser, go to [email or web address] 2. Click on the First Time User? Click Here link in the Sign In box. You will see the New Member Sign Up page. 3. Enter your Webcrunch Access Code exactly as it appears below. You will not need to use this code after youve completed the sign-up process. If you do not sign up before the expiration date, you must request a new code. · Web[employer]unch Access Code: [email or web address] Expires: 1/23/2018  8:14 AM 
 
4. Enter the last four digits of your Social Security Number (xxxx) and Date of Birth (mm/dd/yyyy) as indicated and click Submit. You will be taken to the next sign-up page. 5. Create a Semantriat ID. This will be your Webcrunch login ID and cannot be changed, so think of one that is secure and easy to remember. 6. Create a Webcrunch password. You can change your password at any time. 7. Enter your Password Reset Question and Answer.  This can be used at a later time if you forget your password. 8. Enter your e-mail address. You will receive e-mail notification when new information is available in [address] 9. Click Sign Up. You can now view and download portions of your medical record. 10. Click the Download Summary menu link to download a portable copy of your medical information. If you have questions, please visit the Frequently Asked Questions section of the [email or web address] website. Remember, [email or web address] is NOT to be used for urgent needs. For medical emergencies, dial 911. Now available from your iPhone and Android! Please provide this summary of care documentation to your next provider. Your primary care clinician is listed as [employer] If you have any questions after today's visit, please call [phone].
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
## 2017-11-29 NOTE — PROGRESS NOTES
A user error has taken place:no services performed patient scheduled to see Provider.   Juice Em LPN

## 2017-11-29 NOTE — PROGRESS NOTES
Chief Complaint   Patient presents with    Back Pain     follow up      1. Have you been to the ER, urgent care clinic since your last visit? Hospitalized since your last visit? No    2. Have you seen or consulted any other health care providers outside of the [address] [place] [address] since your last visit? Include any pap smears or colon screening.  No

## 2017-12-12 ENCOUNTER — OFFICE VISIT (OUTPATIENT)
Dept: CARDIOLOGY CLINIC | Age: 52
End: 2017-12-12

## 2017-12-12 VITALS
HEIGHT: 71 IN | BODY MASS INDEX: 25.42 KG/M2 | DIASTOLIC BLOOD PRESSURE: 90 MMHG | OXYGEN SATURATION: 95 % | WEIGHT: 181.6 LBS | RESPIRATION RATE: 16 BRPM | SYSTOLIC BLOOD PRESSURE: 130 MMHG | HEART RATE: 88 BPM

## 2017-12-12 DIAGNOSIS — D86.9 SARCOID: ICD-10-CM

## 2017-12-12 DIAGNOSIS — R06.02 SHORT OF BREATH ON EXERTION: Primary | ICD-10-CM

## 2017-12-12 DIAGNOSIS — I10 BENIGN ESSENTIAL HTN: ICD-10-CM

## 2017-12-12 DIAGNOSIS — J43.9 PULMONARY EMPHYSEMA, UNSPECIFIED EMPHYSEMA TYPE (HCC): ICD-10-CM

## 2017-12-12 DIAGNOSIS — F31.81 BIPOLAR 2 DISORDER (HCC): ICD-10-CM

## 2017-12-12 NOTE — PROGRESS NOTES
[place]: Kimberly Thompson  [phone]    History of Present Illness:   Mr. 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 Marcela Patel. Cardiac cath 05/24/2016 noted no coronary artery disease. He is here now due to recent chest pain a few weeks ago. He says he was eating turkey and felt sudden severe persistent chest pain. He went to [facility] and workup there was unrevealing. A few nights ago, he had recurrent chest discomfort also while at rest.  He continues to have exertional shortness of breath, but says this has been stable. He is followed by pulmonary, Dr. Neymar Vasquez. Back in 08/2017 when they did a CT scan, they noted significant emphysema and scarring with sarcoid. He needs a new pain and back specialist and his primary care is helping to work on this. He is compensated on exam with clear lungs and no lower extremity edema. Blood pressure was 130/90 with a heart rate of 88. Assessment and Plan:   1. Chest pain. Noncardiac and possibly GI related. His cardiac catheterization in 2016 was normal and reassured him. 2. Shortness of breath. Secondary to emphysema and sarcoid. Followed closely by pulmonary, Dr. Neymar Vasquez. His echocardiogram earlier this year was normal.   3. Essential hypertension. Blood pressure is controlled and no changes made. 4. Chronic back pain. Followed by Dr. Soledad Swanson. 5. History of alcohol and tobacco use. Quit in 2015 and 2016 respectively. 6. Bipolar. No active cardiac issues and he can follow here on an as needed basis. He  has a past medical history of Asthma; Bipolar disorder ([address] Cataracts, bilateral; DDD (degenerative disc disease);  Fracture of left humerus; Glaucoma; Heart failure ([facility] [address] Hypertension; Ill-defined condition (2000); Ill-defined condition; Ill-defined condition (2016); Other ill-defined conditions(799.89); Sarcoid; and Sarcoidosis. All other systems negative except as above. PE  Vitals:    12/12/17 1520   BP: 130/90   Pulse: 88   Resp: 16   SpO2: 95%   Weight: 181 lb 9.6 oz (82.4 kg)   Height: 5' 11\" (1.803 m)    Body mass index is 25.33 kg/(m^2).    General appearance - alert, well appearing, and in no distress  Mental status - affect appropriate to mood  Eyes - sclera anicteric, moist mucous membranes  Neck - supple, no JVD  Chest - clear to auscultation, no wheezes, rales or rhonchi  Heart - normal rate, regular rhythm, normal S1, S2, no murmurs, rubs, clicks or gallops  Abdomen - soft, nontender, nondistended, no masses or organomegaly  Extremities - peripheral pulses normal, no pedal edema      Recent Labs:  Lab Results   Component Value Date/Time    Cholesterol, total 143 12/30/2015 03:24 AM    HDL Cholesterol 84 12/30/2015 03:24 AM    LDL, calculated 45.8 12/30/2015 03:24 AM    Triglyceride 66 12/30/2015 03:24 AM    CHOL/HDL Ratio 1.7 12/30/2015 03:24 AM     Lab Results   Component Value Date/Time    Creatinine (POC) 1.1 12/28/2015 07:03 PM    Creatinine 1.19 11/09/2017 09:04 AM     Lab Results   Component Value Date/Time    BUN 15 11/09/2017 09:04 AM    BUN (POC) 14 12/28/2015 07:03 PM     Lab Results   Component Value Date/Time    Potassium 3.5 11/09/2017 09:04 AM     No results found for: HBA1C, HGBE8, HVI0ONBT, ZKM6FBLM  Lab Results   Component Value Date/Time    Hemoglobin (POC) 18.7 12/28/2015 07:03 PM    HGB 15.9 11/09/2017 09:04 AM     Lab Results   Component Value Date/Time    PLATELET 898 26/70/4678 09:04 AM       Reviewed:  Past Medical History:   Diagnosis Date    Asthma     Bipolar disorder (HCC)     Bi-polar, Anxiety    Cataracts, bilateral     DDD (degenerative disc disease)     Fracture of left humerus     non operative    Glaucoma     Heart failure ([facility] [address]     Degenerated heart failure    Hypertension     Ill-defined condition 2000    DX WITH SARCODOSIS    Ill-defined condition     USES O2 AT 3;30AM TO 5 AM EVERDAY & AS NEEDED    Ill-defined condition 2016    HEP C    Other ill-defined conditions(799.89)     Glaucoma    Sarcoid     Sarcoidosis     lung      History   Smoking Status    Former Smoker    Packs/day: 0.25    Years: 6.00    Quit date: 2/6/2016   Smokeless Tobacco    Never Used     History   Alcohol Use No     Comment: quit 2 years or so ago     Allergies   Allergen Reactions    Pcn [Penicillins] Anaphylaxis    Shellfish Derived Anaphylaxis    Iodine Hives and Swelling       Current Outpatient Prescriptions   Medication Sig    predniSONE (DELTASONE) 5 mg tablet 30 mg daily  Indications: sarcoidosis    furosemide (LASIX) 20 mg tablet Take 1 Tab by mouth two (2) times a day.  azithromycin (ZITHROMAX Z-SUSHIL) 250 mg tablet Take two tablets today then one tablet daily    traMADol (ULTRAM) 50 mg tablet Take 1 Tab by mouth every eight (8) hours as needed for Pain. Max Daily Amount: 150 mg.    ketoconazole (NIZORAL) 2 % topical cream Apply  to affected area daily. Rash on neck, continue for 2 weeks after rash resolves    lidocaine (LIDODERM) 5 % 1 Patch by TransDERmal route daily as needed for Pain. Apply patch to the affected area for 12 hours a day and remove for 12 hours a day.  losartan (COZAAR) 100 mg tablet Take 1 Tab by mouth daily.  carvedilol (COREG) 25 mg tablet Take 1 Tab by mouth two (2) times daily (with meals). Indications: Chronic Heart Failure, hypertension    potassium chloride SR 20 mEq TbER Take 20 mEq by mouth daily.  lithium carbonate 150 mg capsule Take 150 mg by mouth two (2) times a day.  prednisoLONE acetate (PRED FORTE) 1 % ophthalmic suspension Administer 1 Drop to both eyes three (3) times daily.  Indications: CATARACTS; SARCOIDOSIS    aspirin delayed-release 325 mg tablet Take 325 mg by mouth daily.  nitroglycerin (NITROSTAT) 0.4 mg SL tablet 1 Tab by SubLINGual route every five (5) minutes as needed for Chest Pain (Max dose of 3).  ALPRAZolam (XANAX) 1 mg tablet Take 1 mg by mouth nightly. Takes 2 X day     No current facility-administered medications for this visit.         Diane Gray MD  [facility] [place]  [address] [facility] [address]  [facility], [address]

## 2017-12-12 NOTE — MR AVS SNAPSHOT
Visit Information Date & Time Provider Department Dept. Phone Encounter #  
 12/12/2017  3:20 PM Bettina Guillen MD [facility] Annel Mejias [phone] [id number] Your Appointments 12/27/2017  9:00 AM  
Any with Jose Callejas MD  
[facility] [address]) Appt Note: 1 month follow up  
 Adan Mensah 1 [facility]  
  
   
 Adan Cramer [phone]  
  
    
 4/25/2018  9:00 AM  
Any with Jose Callejas MD  
[facility] [address]) Appt Note: follow up  
 [address]  
[phone] Upcoming Health Maintenance Date Due Pneumococcal 19-64 Medium Risk (1 of 1 - PPSV23) 1/25/2018* FOBT Q 1 YEAR AGE 50-75 1/25/2018* DTaP/Tdap/Td series (2 - Td) 10/25/2027 *Topic was postponed. The date shown is not the original due date. Allergies as of 12/12/2017  Review Complete On: 12/12/2017 By: Bettina Guillen MD  
  
 Severity Noted Reaction Type Reactions Pcn [Penicillins] High 01/18/2016    Anaphylaxis Shellfish Derived High 03/01/2016    Anaphylaxis Iodine  04/28/2010    Hives, Swelling Current Immunizations  Reviewed on 1/3/2016 Name Date Influenza Vaccine Whole 11/28/2009 TD Vaccine 12/17/2009 Not reviewed this visit Vitals BP Pulse Resp Height(growth percentile) Weight(growth percentile) SpO2  
 130/90 (BP 1 Location: Left arm, BP Patient Position: Sitting) 88 16 5' 11\" (1.803 m) 181 lb 9.6 oz (82.4 kg) 95% BMI Smoking Status 25.33 kg/m2 Former Smoker Vitals History BMI and BSA Data Body Mass Index Body Surface Area  
 25.33 kg/m 2 2.03 m 2 Preferred Pharmacy Pharmacy Name Phone Responsa [email or web address] [facility] [address] [phone] Your Updated Medication List  
  
   
 This list is accurate as of: 12/12/17  5:07 PM.  Always use your most recent med list.  
  
  
  
  
 ALPRAZolam 1 mg tablet Commonly known as:  Wendy Fell Take 1 mg by mouth nightly. Takes 2 X day  
  
 aspirin delayed-release 325 mg tablet Take 325 mg by mouth daily. azithromycin 250 mg tablet Commonly known as:  Socorro [place] Take two tablets today then one tablet daily  
  
 carvedilol 25 mg tablet Commonly known as:  Rosa Les Take 1 Tab by mouth two (2) times daily (with meals). Indications: Chronic Heart Failure, hypertension  
  
 furosemide 20 mg tablet Commonly known as:  LASIX Take 1 Tab by mouth two (2) times a day.  
  
 ketoconazole 2 % topical cream  
Commonly known as:  NIZORAL Apply  to affected area daily. Rash on neck, continue for 2 weeks after rash resolves  
  
 lidocaine 5 % Commonly known as:  LIDODERM  
1 Patch by TransDERmal route daily as needed for Pain. Apply patch to the affected area for 12 hours a day and remove for 12 hours a day. lithium carbonate 150 mg capsule Take 150 mg by mouth two (2) times a day. losartan 100 mg tablet Commonly known as:  COZAAR Take 1 Tab by mouth daily. nitroglycerin 0.4 mg SL tablet Commonly known as:  NITROSTAT  
1 Tab by SubLINGual route every five (5) minutes as needed for Chest Pain (Max dose of 3). potassium chloride SR 20 mEq tablet Commonly known as:  K-TAB Take 20 mEq by mouth daily. prednisoLONE acetate 1 % ophthalmic suspension Commonly known as:  PRED FORTE Administer 1 Drop to both eyes three (3) times daily. Indications: CATARACTS; SARCOIDOSIS  
  
 predniSONE 5 mg tablet Commonly known as:  DELTASONE  
30 mg daily  Indications: sarcoidosis  
  
 traMADol 50 mg tablet Commonly known as:  ULTRAM  
Take 1 Tab by mouth every eight (8) hours as needed for Pain. Max Daily Amount: 150 mg. Introducing [facility]! Glenn Bonilla introduces [employer] patient portal. Now you can access parts of your medical record, email your doctor's office, and request medication refills online. 1. In your internet browser, go to [email or web address] 2. Click on the First Time User? Click Here link in the Sign In box. You will see the New Member Sign Up page. 3. Enter your [employer] Access Code exactly as it appears below. You will not need to use this code after youve completed the sign-up process. If you do not sign up before the expiration date, you must request a new code. · [employer] Access Code: [email or web address] Expires: 1/23/2018  8:14 AM 
 
4. Enter the last four digits of your Social Security Number (xxxx) and Date of Birth (mm/dd/yyyy) as indicated and click Submit. You will be taken to the next sign-up page. 5. Create a [employer] ID. This will be your [employer] login ID and cannot be changed, so think of one that is secure and easy to remember. 6. Create a [employer] password. You can change your password at any time. 7. Enter your Password Reset Question and Answer. This can be used at a later time if you forget your password. 8. Enter your e-mail address. You will receive e-mail notification when new information is available in [address] 9. Click Sign Up. You can now view and download portions of your medical record. 10. Click the Download Summary menu link to download a portable copy of your medical information. If you have questions, please visit the Frequently Asked Questions section of the [employer] website. Remember, [employer] is NOT to be used for urgent needs. For medical emergencies, dial 911. Now available from your iPhone and Android! Please provide this summary of care documentation to your next provider. Your primary care clinician is listed as Mary Duff. If you have any questions after today's visit, please call [phone].

## 2017-12-27 ENCOUNTER — OFFICE VISIT (OUTPATIENT)
Dept: INTERNAL MEDICINE CLINIC | Age: 52
End: 2017-12-27

## 2017-12-27 VITALS
HEART RATE: 97 BPM | BODY MASS INDEX: 26.28 KG/M2 | HEIGHT: 71 IN | RESPIRATION RATE: 22 BRPM | SYSTOLIC BLOOD PRESSURE: 149 MMHG | DIASTOLIC BLOOD PRESSURE: 75 MMHG | TEMPERATURE: 97.7 F | WEIGHT: 187.7 LBS | OXYGEN SATURATION: 89 %

## 2017-12-27 DIAGNOSIS — K21.9 GASTROESOPHAGEAL REFLUX DISEASE WITHOUT ESOPHAGITIS: Primary | ICD-10-CM

## 2017-12-27 DIAGNOSIS — Z79.52 CURRENT CHRONIC USE OF SYSTEMIC STEROIDS: ICD-10-CM

## 2017-12-27 DIAGNOSIS — M51.36 DDD (DEGENERATIVE DISC DISEASE), LUMBAR: ICD-10-CM

## 2017-12-27 DIAGNOSIS — D86.9 SARCOIDOSIS: ICD-10-CM

## 2017-12-27 DIAGNOSIS — I10 ESSENTIAL HYPERTENSION: ICD-10-CM

## 2017-12-27 RX ORDER — PHENOL/SODIUM PHENOLATE
40 AEROSOL, SPRAY (ML) MUCOUS MEMBRANE DAILY
Qty: 120 TAB | Refills: 12 | Status: SHIPPED | OUTPATIENT
Start: 2017-12-27 | End: 2019-08-15 | Stop reason: DRUGHIGH

## 2017-12-27 RX ORDER — FERROUS SULFATE, DRIED 160(50) MG
1 TABLET, EXTENDED RELEASE ORAL 2 TIMES DAILY WITH MEALS
Qty: 120 TAB | Refills: 12 | Status: SHIPPED | OUTPATIENT
Start: 2017-12-27

## 2017-12-27 NOTE — PROGRESS NOTES
Mr. Rachid Muñoz is a 63y.o. year old male who had concerns including Hypertension and GERD. HPI:  Chief Complaint   Patient presents with    Hypertension     rm 5 here to follow up    GERD     patient states he is having acid reflux          Past Medical History:   Diagnosis Date    Asthma     Bipolar disorder ([facility]     Bi-polar, Anxiety    Cataracts, bilateral     DDD (degenerative disc disease)     Fracture of left humerus     non operative    Glaucoma     Heart failure ([facility]     Degenerated heart failure    Hypertension     Ill-defined condition 2000    DX WITH SARCODOSIS    Ill-defined condition     USES O2 AT 3;30AM TO 5 AM EVERDAY & AS NEEDED    Ill-defined condition 2016    HEP C    Other ill-defined conditions(799.89)     Glaucoma    Sarcoid     Sarcoidosis     lung      Current Outpatient Prescriptions   Medication Sig Dispense    Omeprazole delayed release (PRILOSEC D/R) 20 mg tablet Take 2 Tabs by mouth daily. Take 10-20 min before breakfast 120 Tab    calcium-vitamin D (CALCIUM 500+D) 500 mg(1,250mg) -200 unit per tablet Take 1 Tab by mouth two (2) times daily (with meals). 120 Tab    predniSONE (DELTASONE) 5 mg tablet 30 mg daily  Indications: sarcoidosis 180 Tab    furosemide (LASIX) 20 mg tablet Take 1 Tab by mouth two (2) times a day. 60 Tab    ketoconazole (NIZORAL) 2 % topical cream Apply  to affected area daily. Rash on neck, continue for 2 weeks after rash resolves 60 g    lidocaine (LIDODERM) 5 % 1 Patch by TransDERmal route daily as needed for Pain. Apply patch to the affected area for 12 hours a day and remove for 12 hours a day. 1 Package    losartan (COZAAR) 100 mg tablet Take 1 Tab by mouth daily. 90 Tab    carvedilol (COREG) 25 mg tablet Take 1 Tab by mouth two (2) times daily (with meals). Indications: Chronic Heart Failure, hypertension 180 Tab    lithium carbonate 150 mg capsule Take 150 mg by mouth two (2) times a day.      prednisoLONE acetate (PRED FORTE) 1 % ophthalmic suspension Administer 1 Drop to both eyes three (3) times daily. Indications: CATARACTS; SARCOIDOSIS     aspirin delayed-release 325 mg tablet Take 325 mg by mouth daily.  nitroglycerin (NITROSTAT) 0.4 mg SL tablet 1 Tab by SubLINGual route every five (5) minutes as needed for Chest Pain (Max dose of 3). 1 Bottle    ALPRAZolam (XANAX) 1 mg tablet Take 1 mg by mouth nightly. Takes 2 X day     azithromycin (ZITHROMAX Z-SUSHIL) 250 mg tablet Take two tablets today then one tablet daily 6 Tab    traMADol (ULTRAM) 50 mg tablet Take 1 Tab by mouth every eight (8) hours as needed for Pain. Max Daily Amount: 150 mg. 30 Tab    potassium chloride SR 20 mEq TbER Take 20 mEq by mouth daily. 30 Tab     No current facility-administered medications for this visit. Reviewed PmHx, RxHx, FmHx, SocHx, AllgHx and updated and dated in the chart. ROS: Negative except for BOLD  General: fever, chills, fatigue  Respiratory: cough, SOB, wheezing  Cardiovascular:  CP, palpitation, LAW, edema   Gastrointestinal: N/V/D, bleeding  Genito-Urinary: dysuria, hematuria  Musculoskeletal: muscle weakness, pain, swelling    OBJECTIVE:   Visit Vitals    /75 (BP 1 Location: Left arm, BP Patient Position: Sitting)    Pulse 97    Temp 97.7 °F (36.5 °C) (Oral)    Resp 22    Ht 5' 11\" (1.803 m)    Wt 187 lb 11.2 oz (85.1 kg)    SpO2 (!) 89%    BMI 26.18 kg/m2     GEN: The patient appears well, alert, oriented x 3, in no distress. Lungs: clear bilaterally, good air entry, no wheezes, rhonchi or rales. Cardiovascular: regular rate and rhythm. S1 and S2 normal, no murmurs,  Abdomen: + BS, soft with epigastric tenderness, no guarding, rebound, mass or organomegaly. Extremities: no edema, normal peripheral pulses. Neurological: normal, gross sensory and motor in tact without focal findings.     Results for orders placed or performed during the hospital encounter of 11/09/17   CBC WITH AUTOMATED DIFF Result Value Ref Range    WBC 13.2 (H) 4.1 - 11.1 K/uL    RBC 5.30 4. 10 - 5.70 M/uL    HGB 15.9 12.1 - 17.0 g/dL    HCT 47.0 36.6 - 50.3 %    MCV 88.7 80.0 - 99.0 FL    MCH 30.0 26.0 - 34.0 PG    MCHC 33.8 30.0 - 36.5 g/dL    RDW 16.5 (H) 11.5 - 14.5 %    PLATELET 726 048 - 448 K/uL    NEUTROPHILS 78 (H) 32 - 75 %    LYMPHOCYTES 15 12 - 49 %    MONOCYTES 7 5 - 13 %    EOSINOPHILS 0 0 - 7 %    BASOPHILS 0 0 - 1 %    ABS. NEUTROPHILS 10.3 (H) 1.8 - 8.0 K/UL    ABS. LYMPHOCYTES 1.9 0.8 - 3.5 K/UL    ABS. MONOCYTES 0.9 0.0 - 1.0 K/UL    ABS. EOSINOPHILS 0.0 0.0 - 0.4 K/UL    ABS. BASOPHILS 0.0 0.0 - 0.1 K/UL   PROTHROMBIN TIME + INR   Result Value Ref Range    INR 0.9 0.9 - 1.1      Prothrombin time 9.1 9.0 - 11.1 sec   D DIMER   Result Value Ref Range    D-dimer 0.41 0.00 - 0.65 mg/L FEU   METABOLIC PANEL, COMPREHENSIVE   Result Value Ref Range    Sodium 141 136 - 145 mmol/L    Potassium 3.5 3.5 - 5.1 mmol/L    Chloride 105 97 - 108 mmol/L    CO2 23 21 - 32 mmol/L    Anion gap 13 5 - 15 mmol/L    Glucose 132 (H) 65 - 100 mg/dL    BUN 15 6 - 20 MG/DL    Creatinine 1.19 0.70 - 1.30 MG/DL    BUN/Creatinine ratio 13 12 - 20      GFR est AA >60 >60 ml/min/1.73m2    GFR est non-AA >60 >60 ml/min/1.73m2    Calcium 8.4 (L) 8.5 - 10.1 MG/DL    Bilirubin, total 1.0 0.2 - 1.0 MG/DL    ALT (SGPT) 174 (H) 12 - 78 U/L    AST (SGOT) 79 (H) 15 - 37 U/L    Alk.  phosphatase 387 (H) 45 - 117 U/L    Protein, total 6.7 6.4 - 8.2 g/dL    Albumin 3.0 (L) 3.5 - 5.0 g/dL    Globulin 3.7 2.0 - 4.0 g/dL    A-G Ratio 0.8 (L) 1.1 - 2.2     NT-PRO BNP   Result Value Ref Range    NT pro- (H) 0 - 125 PG/ML   POC TROPONIN-I   Result Value Ref Range    Troponin-I (POC) <0.04 0.00 - 0.08 ng/mL   EKG, 12 LEAD, INITIAL   Result Value Ref Range    Ventricular Rate 78 BPM    Atrial Rate 78 BPM    P-R Interval 126 ms    QRS Duration 82 ms    Q-T Interval 386 ms    QTC Calculation (Bezet) 440 ms    Calculated P Axis 57 degrees    Calculated R Axis 74 degrees    Calculated T Axis -8 degrees    Diagnosis       Normal sinus rhythm  Possible Left atrial enlargement  Nonspecific ST and T wave abnormality  When compared with ECG of 06-JUN-2016 10:01,  premature ventricular complexes are no longer present  Questionable change in QRS axis  Nonspecific T wave abnormality has replaced inverted T waves in Lateral leads  QT has shortened  Confirmed by Clark Arreola ([id number]) on 11/10/2017 10:06:19 AM           Assessment/ Plan:       ICD-10-CM ICD-9-CM    1. Gastroesophageal reflux disease without esophagitis K21.9 530.81 Omeprazole delayed release (PRILOSEC D/R) 20 mg tablet- 40mg   Daily for gastric ulcers and possible duodenal ulcers       calcium-vitamin D (CALCIUM 500+D) 500 mg(1,250mg) -200 unit per tablet   2. Current chronic use of systemic steroids Z79.52 V58.65 Omeprazole delayed release (PRILOSEC D/R) 20 mg tablet      calcium-vitamin D (CALCIUM 500+D) 500 mg(1,250mg) -200 unit per tablet   3. Essential hypertension I10 401.9    4. DDD (degenerative disc disease), lumbar M51.36 722.52    5. Sarcoidosis D86.9 135      Start on PPI for GI protection with chronic oral steroids. Calcium and Vitamin D needed for osteopenia protection. Pt is actually 49 yo - error in chart. I have discussed the diagnosis with the patient and the intended plan as seen in the above orders. The patient has received an after-visit summary and questions were answered concerning future plans. Medication Side Effects and Warnings were discussed with patient. Follow-up Disposition:  Return in about 7 weeks (around 2/13/2018) for GERD.       Rashid Laurent MD

## 2017-12-27 NOTE — PROGRESS NOTES
Discussed the patient's BMI with him. The BMI follow up plan is as follows:     dietary management education, guidance, and counseling  encourage exercise  monitor weight  prescribed dietary intake    An After Visit Summary was printed and given to the patient.

## 2017-12-27 NOTE — PATIENT INSTRUCTIONS
Gastroesophageal Reflux Disease (GERD): Care Instructions  Your Care Instructions    Gastroesophageal reflux disease (GERD) is the backward flow of stomach acid into the esophagus. The esophagus is the tube that leads from your throat to your stomach. A one-way valve prevents the stomach acid from moving up into this tube. When you have GERD, this valve does not close tightly enough. If you have mild GERD symptoms including heartburn, you may be able to control the problem with antacids or over-the-counter medicine. Changing your diet, losing weight, and making other lifestyle changes can also help reduce symptoms. Follow-up care is a key part of your treatment and safety. Be sure to make and go to all appointments, and call your doctor if you are having problems. It's also a good idea to know your test results and keep a list of the medicines you take. How can you care for yourself at home? · Take your medicines exactly as prescribed. Call your doctor if you think you are having a problem with your medicine. · Your doctor may recommend over-the-counter medicine. For mild or occasional indigestion, antacids, such as Tums, Gaviscon, Mylanta, or Maalox, may help. Your doctor also may recommend over-the-counter acid reducers, such as Pepcid AC, Tagamet HB, Zantac 75, or Prilosec. Read and follow all instructions on the label. If you use these medicines often, talk with your doctor. · Change your eating habits. ¨ It's best to eat several small meals instead of two or three large meals. ¨ After you eat, wait 2 to 3 hours before you lie down. ¨ Chocolate, mint, and alcohol can make GERD worse. ¨ Spicy foods, foods that have a lot of acid (like tomatoes and oranges), and coffee can make GERD symptoms worse in some people. If your symptoms are worse after you eat a certain food, you may want to stop eating that food to see if your symptoms get better.   · Do not smoke or chew tobacco. Smoking can make GERD worse. If you need help quitting, talk to your doctor about stop-smoking programs and medicines. These can increase your chances of quitting for good. · If you have GERD symptoms at night, raise the head of your bed 6 to 8 inches by putting the frame on blocks or placing a foam wedge under the head of your mattress. (Adding extra pillows does not work.)  · Do not wear tight clothing around your middle. · Lose weight if you need to. Losing just 5 to 10 pounds can help. When should you call for help? Call your doctor now or seek immediate medical care if:  ? · You have new or different belly pain. ? · Your stools are black and tarlike or have streaks of blood. ? Watch closely for changes in your health, and be sure to contact your doctor if:  ? · Your symptoms have not improved after 2 days. ? · Food seems to catch in your throat or chest.   Where can you learn more? Go to http://marylin-chan.info/. Enter P829 in the search box to learn more about \"Gastroesophageal Reflux Disease (GERD): Care Instructions. \"  Current as of: May 12, 2017  Content Version: 11.4  © 2370-7027 [employer]. Care instructions adapted under license by [employer] (which disclaims liability or warranty for this information). If you have questions about a medical condition or this instruction, always ask your healthcare professional. [address] any warranty or liability for your use of this information. Body Mass Index: Care Instructions  Your Care Instructions    Body mass index (BMI) can help you see if your weight is raising your risk for health problems. It uses a formula to compare how much you weigh with how tall you are. · A BMI lower than 18.5 is considered underweight. · A BMI between 18.5 and 24.9 is considered healthy. · A BMI between 25 and 29.9 is considered overweight. A BMI of 30 or higher is considered obese.   If your BMI is in the normal range, it means that you have a lower risk for weight-related health problems. If your BMI is in the overweight or obese range, you may be at increased risk for weight-related health problems, such as high blood pressure, heart disease, stroke, arthritis or joint pain, and diabetes. If your BMI is in the underweight range, you may be at increased risk for health problems such as fatigue, lower protection (immunity) against illness, muscle loss, bone loss, hair loss, and hormone problems. BMI is just one measure of your risk for weight-related health problems. You may be at higher risk for health problems if you are not active, you eat an unhealthy diet, or you drink too much alcohol or use tobacco products. Follow-up care is a key part of your treatment and safety. Be sure to make and go to all appointments, and call your doctor if you are having problems. It's also a good idea to know your test results and keep a list of the medicines you take. How can you care for yourself at home? · Practice healthy eating habits. This includes eating plenty of fruits, vegetables, whole grains, lean protein, and low-fat dairy. · If your doctor recommends it, get more exercise. Walking is a good choice. Bit by bit, increase the amount you walk every day. Try for at least 30 minutes on most days of the week. · Do not smoke. Smoking can increase your risk for health problems. If you need help quitting, talk to your doctor about stop-smoking programs and medicines. These can increase your chances of quitting for good. · Limit alcohol to 2 drinks a day for men and 1 drink a day for women. Too much alcohol can cause health problems. If you have a BMI higher than 25  · Your doctor may do other tests to check your risk for weight-related health problems.  This may include measuring the distance around your waist. A waist measurement of more than 40 inches in men or 35 inches in women can increase the risk of weight-related health problems. · Talk with your doctor about steps you can take to stay healthy or improve your health. You may need to make lifestyle changes to lose weight and stay healthy, such as changing your diet and getting regular exercise. If you have a BMI lower than 18.5  · Your doctor may do other tests to check your risk for health problems. · Talk with your doctor about steps you can take to stay healthy or improve your health. You may need to make lifestyle changes to gain or maintain weight and stay healthy, such as getting more healthy foods in your diet and doing exercises to build muscle. Where can you learn more? Go to http://marylin-chan.info/. Enter S176 in the search box to learn more about \"Body Mass Index: Care Instructions. \"  Current as of: October 13, 2016  Content Version: 11.4  © 5592-4587 [employer]. Care instructions adapted under license by [employer] (which disclaims liability or warranty for this information). If you have questions about a medical condition or this instruction, always ask your healthcare professional. [address] any warranty or liability for your use of this information.

## 2017-12-27 NOTE — MR AVS SNAPSHOT
Visit Information Date & Time Provider Department Dept. Phone Encounter #  
 12/27/2017  9:00 AM Eugene Gtz MD [facility] and [address] [place] [address] [phone] [id number] Follow-up Instructions Return in about 7 weeks (around 2/13/2018) for GERD. Follow-up and Disposition History Your Appointments 4/25/2018  9:00 AM  
Any with Eugene Gtz MD  
[address] [place] [address] and Primary Care [address] [place] [address]) Appt Note: follow up  
 Adan Cramer 90 1 [facility]  
  
   
 Adan Cramer [phone] [id number] Upcoming Health Maintenance Date Due Pneumococcal 19-64 Medium Risk (1 of 1 - PPSV23) 1/25/2018* FOBT Q 1 YEAR AGE 50-75 1/25/2018* DTaP/Tdap/Td series (2 - Td) 10/25/2027 *Topic was postponed. The date shown is not the original due date. Allergies as of 12/27/2017  Review Complete On: 12/27/2017 By: Inga Ear Severity Noted Reaction Type Reactions Pcn [Penicillins] High 01/18/2016    Anaphylaxis Shellfish Derived High 03/01/2016    Anaphylaxis Iodine  04/28/2010    Hives, Swelling Current Immunizations  Reviewed on 1/3/2016 Name Date Influenza Vaccine Whole 11/28/2009 TD Vaccine 12/17/2009 Not reviewed this visit You Were Diagnosed With   
  
 Codes Comments Gastroesophageal reflux disease without esophagitis    -  Primary ICD-10-CM: K21.9 ICD-9-CM: 530.81 Current chronic use of systemic steroids     ICD-10-CM: Z79.52 
ICD-9-CM: V58.65 Essential hypertension     ICD-10-CM: I10 
ICD-9-CM: 401.9 DDD (degenerative disc disease), lumbar     ICD-10-CM: M51.36 
ICD-9-CM: 722.52 Sarcoidosis     ICD-10-CM: D86.9 ICD-9-CM: 135 Vitals BP Pulse Temp Resp Height(growth percentile) Weight(growth percentile) 149/75 (BP 1 Location: Left arm, BP Patient Position: Sitting) 97 97.7 °F (36.5 °C) (Oral) 22 5' 11\" (1.803 m) 187 lb 11.2 oz (85.1 kg) SpO2 BMI Smoking Status (!) 89% 26.18 kg/m2 Former Smoker BMI and BSA Data Body Mass Index Body Surface Area  
 26.18 kg/m 2 2.06 m 2 Preferred Pharmacy Pharmacy Name Phone [email or web address] [place], [address] [phone] Your Updated Medication List  
  
   
This list is accurate as of: 12/27/17  9:27 AM.  Always use your most recent med list.  
  
  
  
  
 ALPRAZolam 1 mg tablet Commonly known as:  Mosetta Harp Take 1 mg by mouth nightly. Takes 2 X day  
  
 aspirin delayed-release 325 mg tablet Take 325 mg by mouth daily. azithromycin 250 mg tablet Commonly known as:  Mic Medin Take two tablets today then one tablet daily  
  
 calcium-vitamin D 500 mg(1,250mg) -200 unit per tablet Commonly known as:  CALCIUM 500+D Take 1 Tab by mouth two (2) times daily (with meals). carvedilol 25 mg tablet Commonly known as:  Linda Golas Take 1 Tab by mouth two (2) times daily (with meals). Indications: Chronic Heart Failure, hypertension  
  
 furosemide 20 mg tablet Commonly known as:  LASIX Take 1 Tab by mouth two (2) times a day.  
  
 ketoconazole 2 % topical cream  
Commonly known as:  NIZORAL Apply  to affected area daily. Rash on neck, continue for 2 weeks after rash resolves  
  
 lidocaine 5 % Commonly known as:  LIDODERM  
1 Patch by TransDERmal route daily as needed for Pain. Apply patch to the affected area for 12 hours a day and remove for 12 hours a day. lithium carbonate 150 mg capsule Take 150 mg by mouth two (2) times a day. losartan 100 mg tablet Commonly known as:  COZAAR Take 1 Tab by mouth daily. nitroglycerin 0.4 mg SL tablet Commonly known as:  NITROSTAT  
1 Tab by SubLINGual route every five (5) minutes as needed for Chest Pain (Max dose of 3). Omeprazole delayed release 20 mg tablet Commonly known as:  PRILOSEC D/R  
 Take 2 Tabs by mouth daily. Take 10-20 min before breakfast  
  
 potassium chloride SR 20 mEq tablet Commonly known as:  K-TAB Take 20 mEq by mouth daily. prednisoLONE acetate 1 % ophthalmic suspension Commonly known as:  PRED FORTE Administer 1 Drop to both eyes three (3) times daily. Indications: CATARACTS; SARCOIDOSIS  
  
 predniSONE 5 mg tablet Commonly known as:  DELTASONE  
30 mg daily  Indications: sarcoidosis  
  
 traMADol 50 mg tablet Commonly known as:  ULTRAM  
Take 1 Tab by mouth every eight (8) hours as needed for Pain. Max Daily Amount: 150 mg.  
  
  
  
  
Prescriptions Sent to Pharmacy Refills Omeprazole delayed release (PRILOSEC D/R) 20 mg tablet 12 Sig: Take 2 Tabs by mouth daily. Take 10-20 min before breakfast  
 Class: Normal  
 [facility]: [email or web address] [address] #: [phone] Route: Oral  
 calcium-vitamin D (CALCIUM 500+D) 500 mg(1,250mg) -200 unit per tablet 12 Sig: Take 1 Tab by mouth two (2) times daily (with meals). Class: Normal  
 Pharmacy: [email or web address] [address] #: [phone] Route: Oral  
  
Follow-up Instructions Return in about 7 weeks (around 2/13/2018) for GERD. Patient Instructions Gastroesophageal Reflux Disease (GERD): Care Instructions Your Care Instructions Gastroesophageal reflux disease (GERD) is the backward flow of stomach acid into the esophagus. The esophagus is the tube that leads from your throat to your stomach. A one-way valve prevents the stomach acid from moving up into this tube. When you have GERD, this valve does not close tightly enough. If you have mild GERD symptoms including heartburn, you may be able to control the problem with antacids or over-the-counter medicine. Changing your diet, losing weight, and making other lifestyle changes can also help reduce symptoms. Follow-up care is a key part of your treatment and safety. Be sure to make and go to all appointments, and call your doctor if you are having problems. It's also a good idea to know your test results and keep a list of the medicines you take. How can you care for yourself at home? · Take your medicines exactly as prescribed. Call your doctor if you think you are having a problem with your medicine. · Your doctor may recommend over-the-counter medicine. For mild or occasional indigestion, antacids, such as Tums, Gaviscon, Mylanta, or Maalox, may help. Your doctor also may recommend over-the-counter acid reducers, such as Pepcid AC, Tagamet HB, Zantac 75, or Prilosec. Read and follow all instructions on the label. If you use these medicines often, talk with your doctor. · Change your eating habits. ¨ It's best to eat several small meals instead of two or three large meals. ¨ After you eat, wait 2 to 3 hours before you lie down. ¨ Chocolate, mint, and alcohol can make GERD worse. ¨ Spicy foods, foods that have a lot of acid (like tomatoes and oranges), and coffee can make GERD symptoms worse in some people. If your symptoms are worse after you eat a certain food, you may want to stop eating that food to see if your symptoms get better. · Do not smoke or chew tobacco. Smoking can make GERD worse. If you need help quitting, talk to your doctor about stop-smoking programs and medicines. These can increase your chances of quitting for good. · If you have GERD symptoms at night, raise the head of your bed 6 to 8 inches by putting the frame on blocks or placing a foam wedge under the head of your mattress. (Adding extra pillows does not work.) · Do not wear tight clothing around your middle. · Lose weight if you need to. Losing just 5 to 10 pounds can help. When should you call for help? Call your doctor now or seek immediate medical care if: 
? · You have new or different belly pain. ? · Your stools are black and tarlike or have streaks of blood. ? Watch closely for changes in your health, and be sure to contact your doctor if: 
? · Your symptoms have not improved after 2 days. ? · Food seems to catch in your throat or chest.  
Where can you learn more? Go to http://marylin-chan.info/. Enter P115 in the search box to learn more about \"Gastroesophageal Reflux Disease (GERD): Care Instructions. \" Current as of: May 12, 2017 Content Version: 11.4 © 9555-6255 [employer]. Care instructions adapted under license by [employer] (which disclaims liability or warranty for this information). If you have questions about a medical condition or this instruction, always ask your healthcare professional. [address] any warranty or liability for your use of this information. Body Mass Index: Care Instructions Your Care Instructions Body mass index (BMI) can help you see if your weight is raising your risk for health problems. It uses a formula to compare how much you weigh with how tall you are. · A BMI lower than 18.5 is considered underweight. · A BMI between 18.5 and 24.9 is considered healthy. · A BMI between 25 and 29.9 is considered overweight. A BMI of 30 or higher is considered obese. If your BMI is in the normal range, it means that you have a lower risk for weight-related health problems. If your BMI is in the overweight or obese range, you may be at increased risk for weight-related health problems, such as high blood pressure, heart disease, stroke, arthritis or joint pain, and diabetes. If your BMI is in the underweight range, you may be at increased risk for health problems such as fatigue, lower protection (immunity) against illness, muscle loss, bone loss, hair loss, and hormone problems. BMI is just one measure of your risk for weight-related health problems. You may be at higher risk for health problems if you are not active, you eat an unhealthy diet, or you drink too much alcohol or use tobacco products. Follow-up care is a key part of your treatment and safety. Be sure to make and go to all appointments, and call your doctor if you are having problems. It's also a good idea to know your test results and keep a list of the medicines you take. How can you care for yourself at home? · Practice healthy eating habits. This includes eating plenty of fruits, vegetables, whole grains, lean protein, and low-fat dairy. · If your doctor recommends it, get more exercise. Walking is a good choice. Bit by bit, increase the amount you walk every day. Try for at least 30 minutes on most days of the week. · Do not smoke. Smoking can increase your risk for health problems. If you need help quitting, talk to your doctor about stop-smoking programs and medicines. These can increase your chances of quitting for good. · Limit alcohol to 2 drinks a day for men and 1 drink a day for women. Too much alcohol can cause health problems. If you have a BMI higher than 25 · Your doctor may do other tests to check your risk for weight-related health problems. This may include measuring the distance around your waist. A waist measurement of more than 40 inches in men or 35 inches in women can increase the risk of weight-related health problems. · Talk with your doctor about steps you can take to stay healthy or improve your health. You may need to make lifestyle changes to lose weight and stay healthy, such as changing your diet and getting regular exercise. If you have a BMI lower than 18.5 · Your doctor may do other tests to check your risk for health problems. · Talk with your doctor about steps you can take to stay healthy or improve your health.  You may need to make lifestyle changes to gain or maintain weight and stay healthy, such as getting more healthy foods in your diet and doing exercises to build muscle. Where can you learn more? Go to http://marylin-chan.info/. Enter S176 in the search box to learn more about \"Body Mass Index: Care Instructions. \" Current as of: October 13, 2016 Content Version: 11.4 © 4602-5811 [employer]. Care instructions adapted under license by [employer] (which disclaims liability or warranty for this information). If you have questions about a medical condition or this instruction, always ask your healthcare professional. [address] any warranty or liability for your use of this information. Patient Instructions History Introducing \[facility] [place] [facility]\"" & HEALTH SERVICES! Fransisca Javier introduces L[facility]ro patient portal. Now you can access parts of your medical record, email your doctor's office, and request medication refills online. 1. In your internet browser, go to [email or web address] 2. Click on the First Time User? Click Here link in the Sign In box. You will see the New Member Sign Up page. 3. Enter your Lytro Access Code exactly as it appears below. You will not need to use this code after youve completed the sign-up process. If you do not sign up before the expiration date, you must request a new code. · Lytro Access Code: [email or web address] Expires: 1/23/2018  8:14 AM 
 
4. Enter the last four digits of your Social Security Number (xxxx) and Date of Birth (mm/dd/yyyy) as indicated and click Submit. You will be taken to the next sign-up page. 5. Create a [employer]t ID. This will be your Lytro login ID and cannot be changed, so think of one that is secure and easy to remember. 6. Create a Lytro password. You can change your password at any time. 7. Enter your Password Reset Question and Answer. This can be used at a later time if you forget your password. 8. Enter your e-mail address.  You will receive e-mail notification when new information is available in Demohour. 9. Click Sign Up. You can now view and download portions of your medical record. 10. Click the Download Summary menu link to download a portable copy of your medical information. If you have questions, please visit the Frequently Asked Questions section of the Demohour website. Remember, Demohour is NOT to be used for urgent needs. For medical emergencies, dial 911. Now available from your iPhone and Android! Please provide this summary of care documentation to your next provider. Your primary care clinician is listed as [employer] If you have any questions after today's visit, please call [phone].

## 2017-12-27 NOTE — PROGRESS NOTES
Chief Complaint   Patient presents with    Hypertension     rm 5 here to follow up    GERD     patient states he is having acid reflux     1. Have you been to the ER, urgent care clinic since your last visit? Hospitalized since your last visit? No    2. Have you seen or consulted any other health care providers outside of the [address] [place] [address] since your last visit? Include any pap smears or colon screening.  No

## 2018-02-20 ENCOUNTER — OFFICE VISIT (OUTPATIENT)
Dept: INTERNAL MEDICINE CLINIC | Age: 53
End: 2018-02-20

## 2018-02-20 VITALS
HEIGHT: 71 IN | RESPIRATION RATE: 16 BRPM | OXYGEN SATURATION: 95 % | HEART RATE: 86 BPM | BODY MASS INDEX: 24.86 KG/M2 | WEIGHT: 177.6 LBS | TEMPERATURE: 97.5 F | SYSTOLIC BLOOD PRESSURE: 101 MMHG | DIASTOLIC BLOOD PRESSURE: 76 MMHG

## 2018-02-20 DIAGNOSIS — S03.00XD DISLOCATION OF TEMPOROMANDIBULAR JOINT, SUBSEQUENT ENCOUNTER: Primary | ICD-10-CM

## 2018-02-20 RX ORDER — IBUPROFEN 800 MG/1
800 TABLET ORAL
Qty: 30 TAB | Refills: 0 | Status: SHIPPED | OUTPATIENT
Start: 2018-02-20 | End: 2018-09-26 | Stop reason: ALTCHOICE

## 2018-02-20 NOTE — PATIENT INSTRUCTIONS
Temporomandibular Disorder: Care Instructions  Your Care Instructions    Temporomandibular (TM) disorders are a problem with the muscles and joints that connect your jaw to your skull. They cause pain when you open your mouth, chew, or yawn. You may feel this pain on one or both sides. TM disorders are often caused by tight jaw muscles. The tightness can be caused by clenching or grinding your teeth. This may happen when you have a lot of stress in your life. If you lower your stress, you may be able to stop clenching or grinding your teeth. This will help relax your jaw and reduce your pain. You may also be able to do some things at home to feel better. But if none of this works, your doctor may prescribe medicine to help relax your muscles and control the pain. Follow-up care is a key part of your treatment and safety. Be sure to make and go to all appointments, and call your doctor if you are having problems. It's also a good idea to know your test results and keep a list of the medicines you take. How can you care for yourself at home? · Put a warm, moist cloth or heating pad set on low on your jaw. Do this for 10 to 20 minutes at a time. Put a thin cloth between the heating pad and your skin. · Avoid hard or chewy foods that cause your jaws to work very hard. Examples include popcorn, jerky, tough meats, chewy breads, gum, and raw apples and carrots. · Choose softer foods that are easy to chew. These include eggs, yogurt, and soup. · Cut your food into small pieces. Chew slowly. · If your jaw gets too painful to chew, or if it locks, you may need to puree your food for a few days or weeks. · To relax your jaw, repeat this exercise for a few minutes every morning and evening. Watch yourself in a mirror. Gently open and close your mouth. Move your jaw straight up and down. But don't do this if it makes your pain worse.   · Get at least 30 minutes of exercise on most days of the week to relieve stress. Walking is a good choice. You also may want to do other activities, such as running, swimming, cycling, or playing tennis or team sports. · Do not:  ¨ Hold a phone between your shoulder and your jaw. ¨ Open your mouth all the way, like when you sing loudly or yawn. ¨ Clench or grind your teeth, bite your lips, or chew your fingernails. ¨ Clench things such as pens, pipes, or cigars between your teeth. When should you call for help? Call your doctor now or seek immediate medical care if:  ? · Your jaw is locked open or shut or it is hard to move your jaw. ? Watch closely for changes in your health, and be sure to contact your doctor if:  ? · Your jaw pain gets worse. ? · Your face is swollen. ? · You do not get better as expected. Where can you learn more? Go to http://marylin-chan.info/. Enter G065 in the search box to learn more about \"Temporomandibular Disorder: Care Instructions. \"  Current as of: May 12, 2017  Content Version: 11.4  © 6630-8203 [employer]. Care instructions adapted under license by [employer] (which disclaims liability or warranty for this information). If you have questions about a medical condition or this instruction, always ask your healthcare professional. [address] any warranty or liability for your use of this information.

## 2018-02-20 NOTE — MR AVS SNAPSHOT
[address] [facility] 
 
 
 [address] [id number] 
[phone] Patient: Corrine Phillips MRN: [id number] Visit Information Date & Time Provider Department Dept. Phone Encounter #  
 2/20/2018  3:15 PM Brad Hines MD [address] [place] [address] [facility] [phone] [id number] Follow-up Instructions Return if symptoms worsen or fail to improve. Follow-up and Disposition History Your Appointments 4/25/2018  9:00 AM  
Any with Brad Hines MD  
[address] [place] [address] and [facility]) Appt Note: follow up  
 [address] [facility]  
  
   
 [id number]  
  
    
 6/5/2018  8:30 AM  
Any with Angie Louis MD  
[address] [place] [address] and [facility]) Appt Note: 90 day follow up  
 [address] [facility]  
  
   
 [address] [id number] Upcoming Health Maintenance Date Due Pneumococcal 19-64 Medium Risk (1 of 1 - PPSV23) 3/9/1976 FOBT Q 1 YEAR AGE 50-75 3/9/2007 DTaP/Tdap/Td series (2 - Td) 10/25/2027 Allergies as of 2/20/2018  Review Complete On: 2/20/2018 By: Rose Willis LPN Severity Noted Reaction Type Reactions Pcn [Penicillins] High 01/18/2016    Anaphylaxis Shellfish Derived High 03/01/2016    Anaphylaxis Iodine  04/28/2010    Hives, Swelling Current Immunizations  Reviewed on 1/3/2016 Name Date Influenza Vaccine Whole 11/28/2009 TD Vaccine 12/17/2009 Not reviewed this visit You Were Diagnosed With   
  
 Codes Comments Dislocation of temporomandibular joint, subsequent encounter    -  Primary ICD-10-CM: S03. 00XD ICD-9-CM: V58.89 Vitals BP Pulse Temp Resp Height(growth percentile) Weight(growth percentile)  101/76 (BP 1 Location: Right arm, BP Patient Position: Sitting) 86 97.5 °F (36.4 °C) (Oral) 16 5' 11\" (1.803 m) 177 lb 9.6 oz (80.6 kg) SpO2 BMI Smoking Status 95% 24.77 kg/m2 Former Smoker BMI and BSA Data Body Mass Index Body Surface Area 24.77 kg/m 2 2.01 m 2 Preferred Pharmacy Pharmacy Name Phone [email or web address] [place], [address] [phone] Your Updated Medication List  
  
   
This list is accurate as of: 2/20/18  4:30 PM.  Always use your most recent med list.  
  
  
  
  
 ALPRAZolam 1 mg tablet Commonly known as:  Oliva Haque Take 1 mg by mouth nightly. Takes 2 X day  
  
 aspirin delayed-release 325 mg tablet Take 325 mg by mouth daily. calcium-vitamin D 500 mg(1,250mg) -200 unit per tablet Commonly known as:  CALCIUM 500+D Take 1 Tab by mouth two (2) times daily (with meals). carvedilol 25 mg tablet Commonly known as:  Chasity Carolyn Take 1 Tab by mouth two (2) times daily (with meals). Indications: Chronic Heart Failure, hypertension  
  
 furosemide 20 mg tablet Commonly known as:  LASIX Take 1 Tab by mouth two (2) times a day. ibuprofen 800 mg tablet Commonly known as:  MOTRIN Take 1 Tab by mouth two (2) times daily as needed for Pain. Take with full glass of water  
  
 ketoconazole 2 % topical cream  
Commonly known as:  NIZORAL Apply  to affected area daily. Rash on neck, continue for 2 weeks after rash resolves  
  
 lidocaine 5 % Commonly known as:  LIDODERM  
1 Patch by TransDERmal route daily as needed for Pain. Apply patch to the affected area for 12 hours a day and remove for 12 hours a day. lithium carbonate 150 mg capsule Take 150 mg by mouth two (2) times a day. losartan 100 mg tablet Commonly known as:  COZAAR Take 1 Tab by mouth daily. nitroglycerin 0.4 mg SL tablet Commonly known as:  NITROSTAT  
1 Tab by SubLINGual route every five (5) minutes as needed for Chest Pain (Max dose of 3). Omeprazole delayed release 20 mg tablet Commonly known as:  PRILOSEC D/R Take 2 Tabs by mouth daily. Take 10-20 min before breakfast  
  
 potassium chloride SR 20 mEq tablet Commonly known as:  K-TAB Take 20 mEq by mouth daily. prednisoLONE acetate 1 % ophthalmic suspension Commonly known as:  PRED FORTE Administer 1 Drop to both eyes three (3) times daily. Indications: CATARACTS; SARCOIDOSIS  
  
 predniSONE 5 mg tablet Commonly known as:  DELTASONE  
30 mg daily  Indications: sarcoidosis  
  
 traMADol 50 mg tablet Commonly known as:  ULTRAM  
Take 1 Tab by mouth every eight (8) hours as needed for Pain. Max Daily Amount: 150 mg.  
  
  
  
  
Prescriptions Sent to Pharmacy Refills  
 ibuprofen (MOTRIN) 800 mg tablet 0 Sig: Take 1 Tab by mouth two (2) times daily as needed for Pain. Take with full glass of water Class: Normal  
 Pharmacy: Smit Ovens [email or web address] [place], [address] [place] [address] #: [phone] Route: Oral  
  
We Performed the Following REFERRAL TO ENT-OTOLARYNGOLOGY [JRK05 Custom] Comments:  
 Please evaluate patient for jaw dislocation. TMJ. eval and treat for recovery Follow-up Instructions Return if symptoms worsen or fail to improve. Referral Information Referral ID Referred By Referred To  
  
 [id number] Aj HAYES [facility] [address], [address] Visits Status Start Date End Date 1 New Request 2/20/18 2/20/19 If your referral has a status of pending review or denied, additional information will be sent to support the outcome of this decision. Patient Instructions Temporomandibular Disorder: Care Instructions Your Care Instructions Temporomandibular (TM) disorders are a problem with the muscles and joints that connect your jaw to your skull.  They cause pain when you open your mouth, chew, or yawn. You may feel this pain on one or both sides. TM disorders are often caused by tight jaw muscles. The tightness can be caused by clenching or grinding your teeth. This may happen when you have a lot of stress in your life. If you lower your stress, you may be able to stop clenching or grinding your teeth. This will help relax your jaw and reduce your pain. You may also be able to do some things at home to feel better. But if none of this works, your doctor may prescribe medicine to help relax your muscles and control the pain. Follow-up care is a key part of your treatment and safety. Be sure to make and go to all appointments, and call your doctor if you are having problems. It's also a good idea to know your test results and keep a list of the medicines you take. How can you care for yourself at home? · Put a warm, moist cloth or heating pad set on low on your jaw. Do this for 10 to 20 minutes at a time. Put a thin cloth between the heating pad and your skin. · Avoid hard or chewy foods that cause your jaws to work very hard. Examples include popcorn, jerky, tough meats, chewy breads, gum, and raw apples and carrots. · Choose softer foods that are easy to chew. These include eggs, yogurt, and soup. · Cut your food into small pieces. Chew slowly. · If your jaw gets too painful to chew, or if it locks, you may need to puree your food for a few days or weeks. · To relax your jaw, repeat this exercise for a few minutes every morning and evening. Watch yourself in a mirror. Gently open and close your mouth. Move your jaw straight up and down. But don't do this if it makes your pain worse. · Get at least 30 minutes of exercise on most days of the week to relieve stress. Walking is a good choice. You also may want to do other activities, such as running, swimming, cycling, or playing tennis or team sports. · Do not: 
¨ Hold a phone between your shoulder and your jaw. ¨ Open your mouth all the way, like when you sing loudly or yawn. ¨ Clench or grind your teeth, bite your lips, or chew your fingernails. ¨ Clench things such as pens, pipes, or cigars between your teeth. When should you call for help? Call your doctor now or seek immediate medical care if: 
? · Your jaw is locked open or shut or it is hard to move your jaw. ? Watch closely for changes in your health, and be sure to contact your doctor if: 
? · Your jaw pain gets worse. ? · Your face is swollen. ? · You do not get better as expected. Where can you learn more? Go to http://marylin-chan.info/. Enter O975 in the search box to learn more about \"Temporomandibular Disorder: Care Instructions. \" Current as of: May 12, 2017 Content Version: 11.4 © 0358-6483 [employer] Care instructions adapted under license by [employer] (which disclaims liability or warranty for this information). If you have questions about a medical condition or this instruction, always ask your healthcare professional. [place] [address] any warranty or liability for your use of this information. Introducing [facility]! Alicia Cleary introduces TotalTakeout patient portal. Now you can access parts of your medical record, email your doctor's office, and request medication refills online. 1. In your internet browser, go to [email or web address] 2. Click on the First Time User? Click Here link in the Sign In box. You will see the New Member Sign Up page. 3. Enter your TotalTakeout Access Code exactly as it appears below. You will not need to use this code after youve completed the sign-up process. If you do not sign up before the expiration date, you must request a new code. · TotalTakeout Access Code: [email or web address] Expires: 5/21/2018  4:30 PM 
 
4.  Enter the last four digits of your Social Security Number (xxxx) and Date of Birth (mm/dd/yyyy) as indicated and click Submit. You will be taken to the next sign-up page. 5. Create a sourceasy ID. This will be your sourceasy login ID and cannot be changed, so think of one that is secure and easy to remember. 6. Create a sourceasy password. You can change your password at any time. 7. Enter your Password Reset Question and Answer. This can be used at a later time if you forget your password. 8. Enter your e-mail address. You will receive e-mail notification when new information is available in [address] 9. Click Sign Up. You can now view and download portions of your medical record. 10. Click the Download Summary menu link to download a portable copy of your medical information. If you have questions, please visit the Frequently Asked Questions section of the sourceasy website. Remember, sourceasy is NOT to be used for urgent needs. For medical emergencies, dial 911. Now available from your iPhone and Android! Please provide this summary of care documentation to your next provider. Your primary care clinician is listed as [employer] If you have any questions after today's visit, please call [phone].

## 2018-02-20 NOTE — PROGRESS NOTES
Mr. Griselda Grayer is a 63y.o. year old male who had concerns including GERD and Jaw Pain. HPI:  Chief Complaint   Patient presents with    GERD     f/u    Jaw Pain     states he fell about 3 weeks, the doctor on the tour bus pop his jaw back in place. having pain in the jaw and can't open mouth wide. Past Medical History:   Diagnosis Date    Asthma     Bipolar disorder ([address]     Bi-polar, Anxiety    Cataracts, bilateral     DDD (degenerative disc disease)     Fracture of left humerus     non operative    Glaucoma     Heart failure (HCC)     Degenerated heart failure    Hypertension     Ill-defined condition 2000    DX WITH SARCODOSIS    Ill-defined condition     USES O2 AT 3;30AM TO 5 AM EVERDAY & AS NEEDED    Ill-defined condition 2016    HEP C    Other ill-defined conditions(799.89)     Glaucoma    Sarcoid     Sarcoidosis     lung      Current Outpatient Prescriptions   Medication Sig Dispense    Omeprazole delayed release (PRILOSEC D/R) 20 mg tablet Take 2 Tabs by mouth daily. Take 10-20 min before breakfast 120 Tab    calcium-vitamin D (CALCIUM 500+D) 500 mg(1,250mg) -200 unit per tablet Take 1 Tab by mouth two (2) times daily (with meals). 120 Tab    predniSONE (DELTASONE) 5 mg tablet 30 mg daily  Indications: sarcoidosis 180 Tab    furosemide (LASIX) 20 mg tablet Take 1 Tab by mouth two (2) times a day. 60 Tab    ketoconazole (NIZORAL) 2 % topical cream Apply  to affected area daily. Rash on neck, continue for 2 weeks after rash resolves 60 g    lidocaine (LIDODERM) 5 % 1 Patch by TransDERmal route daily as needed for Pain. Apply patch to the affected area for 12 hours a day and remove for 12 hours a day. 1 Package    carvedilol (COREG) 25 mg tablet Take 1 Tab by mouth two (2) times daily (with meals). Indications: Chronic Heart Failure, hypertension 180 Tab    potassium chloride SR 20 mEq TbER Take 20 mEq by mouth daily.  30 Tab    lithium carbonate 150 mg capsule Take 150 mg by mouth two (2) times a day.  prednisoLONE acetate (PRED FORTE) 1 % ophthalmic suspension Administer 1 Drop to both eyes three (3) times daily. Indications: CATARACTS; SARCOIDOSIS     aspirin delayed-release 325 mg tablet Take 325 mg by mouth daily.  nitroglycerin (NITROSTAT) 0.4 mg SL tablet 1 Tab by SubLINGual route every five (5) minutes as needed for Chest Pain (Max dose of 3). 1 Bottle    ALPRAZolam (XANAX) 1 mg tablet Take 1 mg by mouth nightly. Takes 2 X day     traMADol (ULTRAM) 50 mg tablet Take 1 Tab by mouth every eight (8) hours as needed for Pain. Max Daily Amount: 150 mg. 30 Tab    losartan (COZAAR) 100 mg tablet Take 1 Tab by mouth daily. 90 Tab     No current facility-administered medications for this visit. Reviewed PmHx, RxHx, FmHx, SocHx, AllgHx and updated and dated in the chart. ROS: Negative except for BOLD  General: fever, chills, fatigue  Respiratory: cough, SOB, wheezing  Cardiovascular:  CP, palpitation, LAW, edema   Gastrointestinal: N/V/D, bleeding  Genito-Urinary: dysuria, hematuria  Musculoskeletal: muscle weakness, pain, swelling    OBJECTIVE:   Visit Vitals    /76 (BP 1 Location: Right arm, BP Patient Position: Sitting)    Pulse 86    Temp 97.5 °F (36.4 °C) (Oral)    Resp 16    Ht 5' 11\" (1.803 m)    Wt 177 lb 9.6 oz (80.6 kg)    SpO2 95%    BMI 24.77 kg/m2     GEN: The patient appears well, alert, oriented x 3, in no distress. ENT: bilateral TM and canal normal.  Neck supple. No adenopathy or thyromegaly. HE. Lungs: clear bilaterally, good air entry, no wheezes, rhonchi or rales. Cardiovascular: regular rate and rhythm. S1 and S2 normal, no murmurs,  Abdomen: + BS, soft without tenderness, guarding, rebound, mass or organomegaly. Extremities: no edema, normal peripheral pulses. Neurological: normal, gross sensory and motor in tact without focal findings. Left jaw pain, limited ROM, tender left TMJ.      Assessment/ Plan: ICD-10-CM ICD-9-CM    1. Dislocation of temporomandibular joint, subsequent encounter S03. 00XD V58.89 REFERRAL TO ENT-OTOLARYNGOLOGY     Neg xrays on initial evaluation. Monitor for recovery. ENT if sx worsen or repeat dislocations. I have discussed the diagnosis with the patient and the intended plan as seen in the above orders. The patient has received an after-visit summary and questions were answered concerning future plans. Medication Side Effects and Warnings were discussed with patient. Follow-up Disposition:  Return if symptoms worsen or fail to improve.       Madai Sterling MD

## 2018-02-20 NOTE — PROGRESS NOTES
Juli Mtz is a 63 y.o. male    Chief Complaint   Patient presents with    GERD     f/u    Jaw Pain     states he fell about 3 weeks, the doctor on the tour bus pop his jaw back in place. having pain in the jaw and can't open mouth wide. 1. Have you been to the ER, urgent care clinic since your last visit? Hospitalized since your last visit? No    2. Have you seen or consulted any other health care providers outside of the [address] [place] [address] since your last visit? Include any pap smears or colon screening.  No

## 2018-03-30 ENCOUNTER — APPOINTMENT (OUTPATIENT)
Dept: GENERAL RADIOLOGY | Age: 53
DRG: 710 | End: 2018-03-30
Attending: NURSE PRACTITIONER
Payer: MEDICAID

## 2018-03-30 ENCOUNTER — HOSPITAL ENCOUNTER (INPATIENT)
Age: 53
LOS: 2 days | Discharge: HOME | DRG: 710 | End: 2018-04-01
Attending: EMERGENCY MEDICINE | Admitting: INTERNAL MEDICINE
Payer: MEDICAID

## 2018-03-30 DIAGNOSIS — J18.9 COMMUNITY ACQUIRED PNEUMONIA, UNSPECIFIED LATERALITY: Primary | ICD-10-CM

## 2018-03-30 DIAGNOSIS — L03.011 PARONYCHIA OF FINGER OF RIGHT HAND: ICD-10-CM

## 2018-03-30 PROBLEM — A41.9 SEPSIS (HCC): Status: ACTIVE | Noted: 2018-03-30

## 2018-03-30 LAB
ALBUMIN SERPL-MCNC: 3.6 G/DL (ref 3.5–5)
ALBUMIN/GLOB SERPL: 0.8 {RATIO} (ref 1.1–2.2)
ALP SERPL-CCNC: 463 U/L (ref 45–117)
ALT SERPL-CCNC: 87 U/L (ref 12–78)
ANION GAP SERPL CALC-SCNC: 10 MMOL/L (ref 5–15)
AST SERPL-CCNC: 50 U/L (ref 15–37)
BASOPHILS # BLD: 0 K/UL (ref 0–0.1)
BASOPHILS NFR BLD: 0 % (ref 0–1)
BILIRUB SERPL-MCNC: 1.2 MG/DL (ref 0.2–1)
BUN SERPL-MCNC: 10 MG/DL (ref 6–20)
BUN/CREAT SERPL: 8 (ref 12–20)
CALCIUM SERPL-MCNC: 10 MG/DL (ref 8.5–10.1)
CHLORIDE SERPL-SCNC: 103 MMOL/L (ref 97–108)
CO2 SERPL-SCNC: 25 MMOL/L (ref 21–32)
CREAT SERPL-MCNC: 1.22 MG/DL (ref 0.7–1.3)
DATE LAST DOSE: ABNORMAL
DIFFERENTIAL METHOD BLD: ABNORMAL
EOSINOPHIL # BLD: 0.2 K/UL (ref 0–0.4)
EOSINOPHIL NFR BLD: 1 % (ref 0–7)
ERYTHROCYTE [DISTWIDTH] IN BLOOD BY AUTOMATED COUNT: 14 % (ref 11.5–14.5)
GLOBULIN SER CALC-MCNC: 4.7 G/DL (ref 2–4)
GLUCOSE BLD STRIP.AUTO-MCNC: 103 MG/DL (ref 65–100)
GLUCOSE SERPL-MCNC: 90 MG/DL (ref 65–100)
HCT VFR BLD AUTO: 53.2 % (ref 36.6–50.3)
HGB BLD-MCNC: 17.8 G/DL (ref 12.1–17)
IMM GRANULOCYTES # BLD: 0.1 K/UL (ref 0–0.04)
IMM GRANULOCYTES NFR BLD AUTO: 1 % (ref 0–0.5)
LACTATE SERPL-SCNC: 1.5 MMOL/L (ref 0.4–2)
LITHIUM SERPL-SCNC: 0.35 MMOL/L (ref 0.6–1.2)
LYMPHOCYTES # BLD: 2.5 K/UL (ref 0.8–3.5)
LYMPHOCYTES NFR BLD: 16 % (ref 12–49)
MCH RBC QN AUTO: 29.9 PG (ref 26–34)
MCHC RBC AUTO-ENTMCNC: 33.5 G/DL (ref 30–36.5)
MCV RBC AUTO: 89.4 FL (ref 80–99)
MONOCYTES # BLD: 1.5 K/UL (ref 0–1)
MONOCYTES NFR BLD: 10 % (ref 5–13)
NEUTS SEG # BLD: 11.1 K/UL (ref 1.8–8)
NEUTS SEG NFR BLD: 72 % (ref 32–75)
NRBC # BLD: 0 K/UL (ref 0–0.01)
NRBC BLD-RTO: 0 PER 100 WBC
PLATELET # BLD AUTO: 179 K/UL (ref 150–400)
PMV BLD AUTO: 11.5 FL (ref 8.9–12.9)
POTASSIUM SERPL-SCNC: 4.2 MMOL/L (ref 3.5–5.1)
PROT SERPL-MCNC: 8.3 G/DL (ref 6.4–8.2)
RBC # BLD AUTO: 5.95 M/UL (ref 4.1–5.7)
REPORTED DOSE,DOSE: ABNORMAL UNITS
REPORTED DOSE/TIME,TMG: ABNORMAL
SERVICE CMNT-IMP: ABNORMAL
SODIUM SERPL-SCNC: 138 MMOL/L (ref 136–145)
WBC # BLD AUTO: 15.4 K/UL (ref 4.1–11.1)

## 2018-03-30 PROCEDURE — 77030029684 HC NEB SM VOL KT MONA -A

## 2018-03-30 PROCEDURE — 74011250637 HC RX REV CODE- 250/637: Performed by: INTERNAL MEDICINE

## 2018-03-30 PROCEDURE — 77010033678 HC OXYGEN DAILY

## 2018-03-30 PROCEDURE — 94640 AIRWAY INHALATION TREATMENT: CPT

## 2018-03-30 PROCEDURE — 74011000250 HC RX REV CODE- 250: Performed by: NURSE PRACTITIONER

## 2018-03-30 PROCEDURE — 99284 EMERGENCY DEPT VISIT MOD MDM: CPT

## 2018-03-30 PROCEDURE — 36415 COLL VENOUS BLD VENIPUNCTURE: CPT | Performed by: NURSE PRACTITIONER

## 2018-03-30 PROCEDURE — 96365 THER/PROPH/DIAG IV INF INIT: CPT

## 2018-03-30 PROCEDURE — 82962 GLUCOSE BLOOD TEST: CPT

## 2018-03-30 PROCEDURE — 74011250636 HC RX REV CODE- 250/636: Performed by: NURSE PRACTITIONER

## 2018-03-30 PROCEDURE — 0J9J0ZZ DRAINAGE OF RIGHT HAND SUBCUTANEOUS TISSUE AND FASCIA, OPEN APPROACH: ICD-10-PCS | Performed by: NURSE PRACTITIONER

## 2018-03-30 PROCEDURE — 74011250636 HC RX REV CODE- 250/636: Performed by: INTERNAL MEDICINE

## 2018-03-30 PROCEDURE — 75810000289 HC I&D ABSCESS SIMP/COMP/MULT

## 2018-03-30 PROCEDURE — 71046 X-RAY EXAM CHEST 2 VIEWS: CPT

## 2018-03-30 PROCEDURE — 74011000250 HC RX REV CODE- 250: Performed by: INTERNAL MEDICINE

## 2018-03-30 PROCEDURE — 73140 X-RAY EXAM OF FINGER(S): CPT

## 2018-03-30 PROCEDURE — 87040 BLOOD CULTURE FOR BACTERIA: CPT | Performed by: NURSE PRACTITIONER

## 2018-03-30 PROCEDURE — 85025 COMPLETE CBC W/AUTO DIFF WBC: CPT | Performed by: NURSE PRACTITIONER

## 2018-03-30 PROCEDURE — 87899 AGENT NOS ASSAY W/OPTIC: CPT | Performed by: INTERNAL MEDICINE

## 2018-03-30 PROCEDURE — 83605 ASSAY OF LACTIC ACID: CPT | Performed by: NURSE PRACTITIONER

## 2018-03-30 PROCEDURE — 80178 ASSAY OF LITHIUM: CPT | Performed by: INTERNAL MEDICINE

## 2018-03-30 PROCEDURE — 65270000032 HC RM SEMIPRIVATE

## 2018-03-30 PROCEDURE — 87449 NOS EACH ORGANISM AG IA: CPT | Performed by: INTERNAL MEDICINE

## 2018-03-30 PROCEDURE — 80053 COMPREHEN METABOLIC PANEL: CPT | Performed by: NURSE PRACTITIONER

## 2018-03-30 RX ORDER — CITALOPRAM 20 MG/1
40 TABLET, FILM COATED ORAL DAILY
Status: DISCONTINUED | OUTPATIENT
Start: 2018-03-31 | End: 2018-04-01 | Stop reason: HOSPADM

## 2018-03-30 RX ORDER — FLUTICASONE FUROATE AND VILANTEROL 200; 25 UG/1; UG/1
1 POWDER RESPIRATORY (INHALATION) DAILY
Status: DISCONTINUED | OUTPATIENT
Start: 2018-03-31 | End: 2018-03-30 | Stop reason: CLARIF

## 2018-03-30 RX ORDER — CARVEDILOL 12.5 MG/1
25 TABLET ORAL 2 TIMES DAILY WITH MEALS
Status: DISCONTINUED | OUTPATIENT
Start: 2018-03-31 | End: 2018-04-01 | Stop reason: HOSPADM

## 2018-03-30 RX ORDER — SODIUM CHLORIDE 9 MG/ML
100 INJECTION, SOLUTION INTRAVENOUS CONTINUOUS
Status: DISCONTINUED | OUTPATIENT
Start: 2018-03-30 | End: 2018-04-01

## 2018-03-30 RX ORDER — LEVOFLOXACIN 5 MG/ML
750 INJECTION, SOLUTION INTRAVENOUS
Status: COMPLETED | OUTPATIENT
Start: 2018-03-30 | End: 2018-03-30

## 2018-03-30 RX ORDER — CITALOPRAM 40 MG/1
40 TABLET, FILM COATED ORAL DAILY
COMMUNITY

## 2018-03-30 RX ORDER — LITHIUM CARBONATE 450 MG/1
450 TABLET ORAL 2 TIMES DAILY
Status: DISCONTINUED | OUTPATIENT
Start: 2018-03-30 | End: 2018-04-01 | Stop reason: HOSPADM

## 2018-03-30 RX ORDER — IPRATROPIUM BROMIDE AND ALBUTEROL SULFATE 2.5; .5 MG/3ML; MG/3ML
3 SOLUTION RESPIRATORY (INHALATION)
Status: COMPLETED | OUTPATIENT
Start: 2018-03-30 | End: 2018-03-30

## 2018-03-30 RX ORDER — LEVOFLOXACIN 5 MG/ML
750 INJECTION, SOLUTION INTRAVENOUS EVERY 24 HOURS
Status: DISCONTINUED | OUTPATIENT
Start: 2018-03-31 | End: 2018-04-01

## 2018-03-30 RX ORDER — BUPIVACAINE HYDROCHLORIDE 5 MG/ML
10 INJECTION, SOLUTION EPIDURAL; INTRACAUDAL
Status: COMPLETED | OUTPATIENT
Start: 2018-03-30 | End: 2018-03-30

## 2018-03-30 RX ORDER — SODIUM CHLORIDE 0.9 % (FLUSH) 0.9 %
5-10 SYRINGE (ML) INJECTION AS NEEDED
Status: DISCONTINUED | OUTPATIENT
Start: 2018-03-30 | End: 2018-04-01 | Stop reason: HOSPADM

## 2018-03-30 RX ORDER — LOSARTAN POTASSIUM 50 MG/1
100 TABLET ORAL DAILY
Status: DISCONTINUED | OUTPATIENT
Start: 2018-03-31 | End: 2018-04-01 | Stop reason: HOSPADM

## 2018-03-30 RX ORDER — FLUTICASONE FUROATE AND VILANTEROL 200; 25 UG/1; UG/1
1 POWDER RESPIRATORY (INHALATION) DAILY
COMMUNITY
End: 2018-06-14 | Stop reason: SDUPTHER

## 2018-03-30 RX ORDER — PANTOPRAZOLE SODIUM 40 MG/1
40 TABLET, DELAYED RELEASE ORAL DAILY
Status: DISCONTINUED | OUTPATIENT
Start: 2018-03-31 | End: 2018-04-01 | Stop reason: HOSPADM

## 2018-03-30 RX ORDER — FLUTICASONE PROPIONATE AND SALMETEROL 500; 50 UG/1; UG/1
1 POWDER RESPIRATORY (INHALATION)
Status: DISCONTINUED | OUTPATIENT
Start: 2018-03-30 | End: 2018-04-01 | Stop reason: HOSPADM

## 2018-03-30 RX ORDER — PREDNISONE 5 MG/1
5 TABLET ORAL
Status: DISCONTINUED | OUTPATIENT
Start: 2018-03-31 | End: 2018-04-01 | Stop reason: HOSPADM

## 2018-03-30 RX ORDER — LITHIUM CARBONATE 450 MG/1
450 TABLET ORAL 2 TIMES DAILY
COMMUNITY
End: 2020-02-24 | Stop reason: ALTCHOICE

## 2018-03-30 RX ORDER — ASPIRIN 81 MG/1
81 TABLET ORAL DAILY
Status: DISCONTINUED | OUTPATIENT
Start: 2018-03-31 | End: 2018-04-01 | Stop reason: HOSPADM

## 2018-03-30 RX ORDER — SODIUM CHLORIDE 0.9 % (FLUSH) 0.9 %
5-10 SYRINGE (ML) INJECTION EVERY 8 HOURS
Status: DISCONTINUED | OUTPATIENT
Start: 2018-03-30 | End: 2018-04-01 | Stop reason: HOSPADM

## 2018-03-30 RX ORDER — ALPRAZOLAM 1 MG/1
1 TABLET ORAL
Status: DISCONTINUED | OUTPATIENT
Start: 2018-03-30 | End: 2018-03-31

## 2018-03-30 RX ORDER — IPRATROPIUM BROMIDE AND ALBUTEROL SULFATE 2.5; .5 MG/3ML; MG/3ML
3 SOLUTION RESPIRATORY (INHALATION)
Status: DISCONTINUED | OUTPATIENT
Start: 2018-03-30 | End: 2018-04-01 | Stop reason: HOSPADM

## 2018-03-30 RX ORDER — ENOXAPARIN SODIUM 100 MG/ML
40 INJECTION SUBCUTANEOUS EVERY 24 HOURS
Status: DISCONTINUED | OUTPATIENT
Start: 2018-03-30 | End: 2018-03-31

## 2018-03-30 RX ORDER — ASPIRIN 81 MG/1
81 TABLET ORAL DAILY
COMMUNITY

## 2018-03-30 RX ORDER — FERROUS SULFATE, DRIED 160(50) MG
1 TABLET, EXTENDED RELEASE ORAL 2 TIMES DAILY WITH MEALS
Status: DISCONTINUED | OUTPATIENT
Start: 2018-03-31 | End: 2018-04-01 | Stop reason: HOSPADM

## 2018-03-30 RX ADMIN — ALPRAZOLAM 1 MG: 1 TABLET ORAL at 22:02

## 2018-03-30 RX ADMIN — FLUTICASONE PROPIONATE AND SALMETEROL 1 PUFF: 50; 500 POWDER RESPIRATORY (INHALATION) at 22:19

## 2018-03-30 RX ADMIN — Medication 10 ML: at 22:04

## 2018-03-30 RX ADMIN — IPRATROPIUM BROMIDE AND ALBUTEROL SULFATE 3 ML: .5; 3 SOLUTION RESPIRATORY (INHALATION) at 17:31

## 2018-03-30 RX ADMIN — ENOXAPARIN SODIUM 40 MG: 100 INJECTION SUBCUTANEOUS at 18:42

## 2018-03-30 RX ADMIN — BUPIVACAINE HYDROCHLORIDE 50 MG: 5 INJECTION, SOLUTION EPIDURAL; INTRACAUDAL at 15:06

## 2018-03-30 RX ADMIN — LEVOFLOXACIN 750 MG: 5 INJECTION, SOLUTION INTRAVENOUS at 14:53

## 2018-03-30 RX ADMIN — IPRATROPIUM BROMIDE AND ALBUTEROL SULFATE 3 ML: .5; 3 SOLUTION RESPIRATORY (INHALATION) at 13:18

## 2018-03-30 RX ADMIN — SODIUM CHLORIDE 2500 ML: 900 INJECTION, SOLUTION INTRAVENOUS at 15:43

## 2018-03-30 RX ADMIN — SODIUM CHLORIDE 100 ML/HR: 900 INJECTION, SOLUTION INTRAVENOUS at 20:31

## 2018-03-30 RX ADMIN — LITHIUM CARBONATE 450 MG: 450 TABLET, EXTENDED RELEASE ORAL at 22:02

## 2018-03-30 RX ADMIN — IPRATROPIUM BROMIDE AND ALBUTEROL SULFATE 3 ML: .5; 3 SOLUTION RESPIRATORY (INHALATION) at 23:33

## 2018-03-30 NOTE — PROGRESS NOTES
[facility] Admission Pharmacy Medication Reconciliation     Findings:   1) Patient confirmed Lithium dose at  mg BID. Information obtained from: Patient interview    Past Medical History/Disease States:  Past Medical History:   Diagnosis Date    Asthma     Bipolar disorder ([address]     Bi-polar, Anxiety    Cataracts, bilateral     DDD (degenerative disc disease)     Fracture of left humerus     non operative    Glaucoma     [facility] failure ([facility]     Degenerated heart failure    Hypertension     Ill-defined condition 2000    DX WITH SARCODOSIS    Ill-defined condition     USES O2 AT 3;30AM TO 5 AM EVERDAY & AS NEEDED    Ill-defined condition 2016    HEP C    Other ill-defined conditions(799.89)     Glaucoma    Sarcoid     Sarcoidosis     lung          Patient allergies: Allergies as of 03/30/2018 - Review Complete 03/30/2018   Allergen Reaction Noted    Pcn [penicillins] Anaphylaxis 01/18/2016    Shellfish derived Anaphylaxis 03/01/2016    Iodine Hives and Swelling 04/28/2010         Prior to Admission Medications   Prescriptions Last Dose Informant Patient Reported? Taking? ALPRAZolam (XANAX) 1 mg tablet 3/29/2018  Yes Yes   Sig: Take 1 mg by mouth nightly. Takes 2 X day   Omeprazole delayed release (PRILOSEC D/R) 20 mg tablet 3/23/2018  No Yes   Sig: Take 2 Tabs by mouth daily. Take 10-20 min before breakfast   aspirin delayed-release 81 mg tablet 3/29/2018  Yes Yes   Sig: Take 81 mg by mouth daily. calcium-vitamin D (CALCIUM 500+D) 500 mg(1,250mg) -200 unit per tablet 3/29/2018  No Yes   Sig: Take 1 Tab by mouth two (2) times daily (with meals). carvedilol (COREG) 25 mg tablet 3/29/2018  No Yes   Sig: Take 1 Tab by mouth two (2) times daily (with meals). Indications: Chronic Heart Failure, hypertension   citalopram (CELEXA) 40 mg tablet 3/29/2018  Yes Yes   Sig: Take 40 mg by mouth daily.    fluticasone-vilanterol (BREO ELLIPTA) 200-25 mcg/dose inhaler 3/29/2018  Yes Yes   Sig: Take 1 Puff by inhalation daily. furosemide (LASIX) 20 mg tablet 3/29/2018  No Yes   Sig: Take 1 Tab by mouth two (2) times a day. ibuprofen (MOTRIN) 800 mg tablet 3/23/2018  No Yes   Sig: Take 1 Tab by mouth two (2) times daily as needed for Pain. Take with full glass of water   lidocaine (LIDODERM) 5 % 3/29/2018  No Yes   Si Patch by TransDERmal route daily as needed for Pain. Apply patch to the affected area for 12 hours a day and remove for 12 hours a day. lithium carbonate CR (ESKALITH CR) 450 mg CR tablet 3/29/2018  Yes Yes   Sig: Take 450 mg by mouth two (2) times a day. losartan (COZAAR) 100 mg tablet 3/29/2018  No Yes   Sig: Take 1 Tab by mouth daily. nitroglycerin (NITROSTAT) 0.4 mg SL tablet 2018  No Yes   Si Tab by SubLINGual route every five (5) minutes as needed for Chest Pain (Max dose of 3).    predniSONE (DELTASONE) 5 mg tablet 3/29/2018  No Yes   PATIENT TAKES DIFFERENTLY: 5 mg daily  Si mg daily  Indications: sarcoidosis         Thank you,  Ora Jules, [facility]

## 2018-03-30 NOTE — IP AVS SNAPSHOT
[facility] 
 
 
 [address] Patient: Elizabeth Michel MRN: [id number] :1965 About your hospitalization You were admitted on:  2018 You last received care in the:  [address] You were discharged on:  2018 Why you were hospitalized Your primary diagnosis was:  Sepsis (Hcc) Your diagnoses also included:  Cap (Community Acquired Pneumonia), Sarcoidosis, Bipolar 2 Disorder (Hcc), Ddd (Degenerative Disc Disease), Lumbar Follow-up Information Follow up With Details Comments Contact Info Jd Alvarez, [address] [phone] Discharge Orders None A check katherien indicates which time of day the medication should be taken. My Medications START taking these medications Instructions Each Dose to Equal  
 Morning Noon Evening Bedtime  
 fluticasone 50 mcg/actuation nasal spray Commonly known as:  Raymundojose Baez Start taking on:  2018 Your last dose was: Your next dose is:    
   
   
 Use as directed  
     
   
   
   
  
 levoFLOXacin 750 mg tablet Commonly known as:  Elaine Villarreal Your last dose was: Your next dose is: Take 1 Tab by mouth every twenty-four (24) hours for 7 days. 750 mg  
    
   
   
   
  
 loratadine 10 mg tablet Commonly known as:  Marlen Lemos Your last dose was: Your next dose is: Take 1 Tab by mouth daily as needed for Allergies. 10 mg CHANGE how you take these medications Instructions Each Dose to Equal  
 Morning Noon Evening Bedtime  
 aspirin delayed-release 81 mg tablet What changed:  Another medication with the same name was removed. Continue taking this medication, and follow the directions you see here. Your last dose was: Your next dose is: Take 81 mg by mouth daily. 81 mg  
    
   
   
   
  
 lithium carbonate  mg CR tablet Commonly known as:  ESKALITH CR What changed:  Another medication with the same name was removed. Continue taking this medication, and follow the directions you see here. Your last dose was: Your next dose is: Take 450 mg by mouth two (2) times a day. 450 mg  
    
   
   
   
  
 predniSONE 5 mg tablet Commonly known as:  Rocio Smoker What changed:   
- how much to take 
- how to take this - when to take this 
- additional instructions Your last dose was: Your next dose is:    
   
   
 30 mg daily  Indications: sarcoidosis CONTINUE taking these medications Instructions Each Dose to Equal  
 Morning Noon Evening Bedtime BREO ELLIPTA 200-25 mcg/dose inhaler Generic drug:  fluticasone-vilanterol Your last dose was: Your next dose is: Take 1 Puff by inhalation daily. 1 Puff  
    
   
   
   
  
 calcium-vitamin D 500 mg(1,250mg) -200 unit per tablet Commonly known as:  CALCIUM 500+D Your last dose was: Your next dose is: Take 1 Tab by mouth two (2) times daily (with meals). 1 Tab  
    
   
   
   
  
 carvedilol 25 mg tablet Commonly known as:  Oriska Parrot Your last dose was: Your next dose is: Take 1 Tab by mouth two (2) times daily (with meals). Indications: Chronic Heart Failure, hypertension 25 mg  
    
   
   
   
  
 citalopram 40 mg tablet Commonly known as:  Jaymel Bulla Your last dose was: Your next dose is: Take 40 mg by mouth daily. 40 mg  
    
   
   
   
  
 furosemide 20 mg tablet Commonly known as:  LASIX Your last dose was: Your next dose is: Take 1 Tab by mouth two (2) times a day. 20 mg  
    
   
   
   
  
 ibuprofen 800 mg tablet Commonly known as:  MOTRIN  
   
 Your last dose was: Your next dose is: Take 1 Tab by mouth two (2) times daily as needed for Pain. Take with full glass of water 800 mg  
    
   
   
   
  
 lidocaine 5 % Commonly known as:  Teodoro Kocher Your last dose was: Your next dose is:    
   
   
 1 Patch by TransDERmal route daily as needed for Pain. Apply patch to the affected area for 12 hours a day and remove for 12 hours a day. 1 Patch  
    
   
   
   
  
 losartan 100 mg tablet Commonly known as:  COZAAR Your last dose was: Your next dose is: Take 1 Tab by mouth daily. 100 mg  
    
   
   
   
  
 nitroglycerin 0.4 mg SL tablet Commonly known as:  NITROSTAT Your last dose was: Your next dose is:    
   
   
 1 Tab by SubLINGual route every five (5) minutes as needed for Chest Pain (Max dose of 3). 0.4 mg Omeprazole delayed release 20 mg tablet Commonly known as:  PRILOSEC D/R Your last dose was: Your next dose is: Take 2 Tabs by mouth daily. Take 10-20 min before breakfast  
 40 mg  
    
   
   
   
  
  
STOP taking these medications ALPRAZolam 1 mg tablet Commonly known as:  Konrad Deleon Where to Get Your Medications Information on where to get these meds will be given to you by the nurse or doctor. ! Ask your nurse or doctor about these medications  
  fluticasone 50 mcg/actuation nasal spray  
 levoFLOXacin 750 mg tablet  
 loratadine 10 mg tablet Discharge Instructions Pneumonia: Care Instructions Your Care Instructions Pneumonia is an infection of the lungs. Most cases are caused by infections from bacteria or viruses. Pneumonia may be mild or very severe. If it is caused by bacteria, you will be treated with antibiotics.  It may take a few weeks to a few months to recover fully from pneumonia, depending on how sick you were and whether your overall health is good. Follow-up care is a key part of your treatment and safety. Be sure to make and go to all appointments, and call your doctor if you are having problems. It's also a good idea to know your test results and keep a list of the medicines you take. How can you care for yourself at home? · Take your antibiotics exactly as directed. Do not stop taking the medicine just because you are feeling better. You need to take the full course of antibiotics. · Take your medicines exactly as prescribed. Call your doctor if you think you are having a problem with your medicine. · Get plenty of rest and sleep. You may feel weak and tired for a while, but your energy level will improve with time. · To prevent dehydration, drink plenty of fluids, enough so that your urine is light yellow or clear like water. Choose water and other caffeine-free clear liquids until you feel better. If you have kidney, heart, or liver disease and have to limit fluids, talk with your doctor before you increase the amount of fluids you drink. · Take care of your cough so you can rest. A cough that brings up mucus from your lungs is common with pneumonia. It is one way your body gets rid of the infection. But if coughing keeps you from resting or causes severe fatigue and chest-wall pain, talk to your doctor. He or she may suggest that you take a medicine to reduce the cough. · Use a vaporizer or humidifier to add moisture to your bedroom. Follow the directions for cleaning the machine. · Do not smoke or allow others to smoke around you. Smoke will make your cough last longer. If you need help quitting, talk to your doctor about stop-smoking programs and medicines. These can increase your chances of quitting for good. · Take an over-the-counter pain medicine, such as acetaminophen (Tylenol), ibuprofen (Advil, Motrin), or naproxen (Aleve). Read and follow all instructions on the label. · Do not take two or more pain medicines at the same time unless the doctor told you to. Many pain medicines have acetaminophen, which is Tylenol. Too much acetaminophen (Tylenol) can be harmful. · If you were given a spirometer to measure how well your lungs are working, use it as instructed. This can help your doctor tell how your recovery is going. · To prevent pneumonia in the future, talk to your doctor about getting a flu vaccine (once a year) and a pneumococcal vaccine (one time only for most people). When should you call for help? Call 911 anytime you think you may need emergency care. For example, call if: 
? · You have severe trouble breathing. ?Call your doctor now or seek immediate medical care if: 
? · You cough up dark brown or bloody mucus (sputum). ? · You have new or worse trouble breathing. ? · You are dizzy or lightheaded, or you feel like you may faint. ? Watch closely for changes in your health, and be sure to contact your doctor if: 
? · You have a new or higher fever. ? · You are coughing more deeply or more often. ? · You are not getting better after 2 days (48 hours). ? · You do not get better as expected. Where can you learn more? Go to http://marylin-chan.info/. Enter [id number] in the search box to learn more about \"Pneumonia: Care Instructions. \" Current as of: May 12, 2017 Content Version: 11.4 © 5763-2444 [employer]. Care instructions adapted under license by [employer] (which disclaims liability or warranty for this information). If you have questions about a medical condition or this instruction, always ask your healthcare professional. [address] [address] any warranty or liability for your use of this information. [employer] Announcement We are excited to announce that we are making your provider's discharge notes available to you in [employer].   You will see these notes when they are completed and signed by the physician that discharged you from your recent hospital stay. If you have any questions or concerns about any information you see in [employer], please call the Health Information Department where you were seen or reach out to your Primary Care Provider for more information about your plan of care. Introducing [facility] SERVICES! Shola Rader introduces [employer] patient portal. Now you can access parts of your medical record, email your doctor's office, and request medication refills online. 1. In your internet browser, go to [email or web address] 2. Click on the First Time User? Click Here link in the Sign In box. You will see the New Member Sign Up page. 3. Enter your [employer] Access Code exactly as it appears below. You will not need to use this code after youve completed the sign-up process. If you do not sign up before the expiration date, you must request a new code. · [employer] Access Code: [email or web address] Expires: 5/21/2018  5:30 PM 
 
4. Enter the last four digits of your Social Security Number (xxxx) and Date of Birth (mm/dd/yyyy) as indicated and click Submit. You will be taken to the next sign-up page. 5. Create a Bimbasket ID. This will be your [employer] login ID and cannot be changed, so think of one that is secure and easy to remember. 6. Create a [employer] password. You can change your password at any time. 7. Enter your Password Reset Question and Answer. This can be used at a later time if you forget your password. 8. Enter your e-mail address. You will receive e-mail notification when new information is available in [address] 9. Click Sign Up. You can now view and download portions of your medical record. 10. Click the Download Summary menu link to download a portable copy of your medical information.  
 
If you have questions, please visit the Frequently Asked Questions section of the [employer]. Remember, MyChart is NOT to be used for urgent needs. For medical emergencies, dial 911. Now available from your iPhone and Android! Introducing Thomas Fischer As a [employer] patient, I wanted to make you aware of our electronic visit tool called Thomas Fischer. [employer] 24/7 allows you to connect within minutes with a medical provider 24 hours a day, seven days a week via a mobile device or tablet or logging into a secure website from your computer. You can access Thomas Fischer from anywhere in the [place]. A virtual visit might be right for you when you have a simple condition and feel like you just dont want to get out of bed, or cant get away from work for an appointment, when your regular [employer] provider is not available (evenings, weekends or holidays), or when youre out of town and need minor care. Electronic visits cost only $49 and if the [employer] 24/7 provider determines a prescription is needed to treat your condition, one can be electronically transmitted to a nearby pharmacy*. Please take a moment to enroll today if you have not already done so. The enrollment process is free and takes just a few minutes. To enroll, please download the [employer] 24/7 gil to your tablet or phone, or visit [email or web address] to enroll on your computer. And, as an [address] [place] [address] patient with a [employer] account, the results of your visits will be scanned into your electronic medical record and your primary care provider will be able to view the scanned results. We urge you to continue to see your regular [employer] provider for your ongoing medical care. And while your primary care provider may not be the one available when you seek a Thomas Fischer virtual visit, the peace of mind you get from getting a real diagnosis real time can be priceless. For more information on Thomas Woodrufflissfin, view our Frequently Asked Questions (FAQs) at [email or web address]. Sincerely, 
 
Wagner Cox MD 
Chief Medical Officer Zach Castro *:  certain medications cannot be prescribed via Thomas Ranjantonie Unresulted Labs-Please follow up with your PCP about these lab tests Order Current Status LEGIONELLA PNEUMOPHILA AG, URINE In process S. PNEUMO AG, UR/CSF In process CULTURE, BLOOD Preliminary result CULTURE, BLOOD Preliminary result Providers Seen During Your Hospitalization Provider Specialty Primary office phone Ernestina Taylor MD Emergency Medicine [phone] Jorge Zelaya MD Hospitalist [phone] Your Primary Care Physician (PCP) Primary Care Physician Office Phone Office Fax Robert Edward [phone] You are allergic to the following Allergen Reactions Pcn (Penicillins) Anaphylaxis Shellfish Derived Anaphylaxis Iodine Hives Swelling Recent Documentation Height Weight BMI Smoking Status 1.753 m 84 kg 27.35 kg/m2 Current Every Day Smoker Emergency Contacts Name Discharge Info Relation Home Work Mobile Jos Stewart ([address]) YES [1] Other Relative [6] [phone] [facility] Ava [1] Caregiver [13] [phone] Armida Gross (Sister-In-Law) DISCHARGE CAREGIVER [3] Other Relative [6] [phone] Patient Belongings The following personal items are in your possession at time of discharge: 
  Dental Appliances: None  Visual Aid: With patient      Home Medications: None   Jewelry: None  Clothing: Shirt, Pants, Hat, Footwear    Other Valuables: Lamonte Please provide this summary of care documentation to your next provider. Signatures-by signing, you are acknowledging that this After Visit Summary has been reviewed with you and you have received a copy. Patient Signature:  ____________________________________________________________ Date:  ____________________________________________________________  
  
Patrice Shackle Provider Signature:  ____________________________________________________________ Date:  ____________________________________________________________
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
## 2018-03-30 NOTE — ED NOTES
Lung sounds reassessed, clear bilaterally; pt agitated stating, \"I came here for my finger and I have been treated for everything but my finger\";  Constanza Swartz NP notified

## 2018-03-30 NOTE — IP AVS SNAPSHOT
[address] [facility] 
 
 
 [address] Patient: Beto Roberto MRN: [id number] :1965 A check katherine indicates which time of day the medication should be taken. My Medications START taking these medications Instructions Each Dose to Equal  
 Morning Noon Evening Bedtime  
 fluticasone 50 mcg/actuation nasal spray Commonly known as:  Yumiko Valderrama Start taking on:  2018 Your last dose was: Your next dose is:    
   
   
 Use as directed  
     
   
   
   
  
 levoFLOXacin 750 mg tablet Commonly known as:  Hunter Gillette Your last dose was: Your next dose is: Take 1 Tab by mouth every twenty-four (24) hours for 7 days. 750 mg  
    
   
   
   
  
 loratadine 10 mg tablet Commonly known as:  Alveta Mallory Your last dose was: Your next dose is: Take 1 Tab by mouth daily as needed for Allergies. 10 mg CHANGE how you take these medications Instructions Each Dose to Equal  
 Morning Noon Evening Bedtime  
 aspirin delayed-release 81 mg tablet What changed:  Another medication with the same name was removed. Continue taking this medication, and follow the directions you see here. Your last dose was: Your next dose is: Take 81 mg by mouth daily. 81 mg  
    
   
   
   
  
 lithium carbonate  mg CR tablet Commonly known as:  ESKALITH CR What changed:  Another medication with the same name was removed. Continue taking this medication, and follow the directions you see here. Your last dose was: Your next dose is: Take 450 mg by mouth two (2) times a day. 450 mg  
    
   
   
   
  
 predniSONE 5 mg tablet Commonly known as:  Rocio Smoker What changed:   
- how much to take 
- how to take this - when to take this 
- additional instructions Your last dose was: Your next dose is:    
   
   
 30 mg daily  Indications: sarcoidosis CONTINUE taking these medications Instructions Each Dose to Equal  
 Morning Noon Evening Bedtime BREO ELLIPTA 200-25 mcg/dose inhaler Generic drug:  fluticasone-vilanterol Your last dose was: Your next dose is: Take 1 Puff by inhalation daily. 1 Puff  
    
   
   
   
  
 calcium-vitamin D 500 mg(1,250mg) -200 unit per tablet Commonly known as:  CALCIUM 500+D Your last dose was: Your next dose is: Take 1 Tab by mouth two (2) times daily (with meals). 1 Tab  
    
   
   
   
  
 carvedilol 25 mg tablet Commonly known as:  Brad  Your last dose was: Your next dose is: Take 1 Tab by mouth two (2) times daily (with meals). Indications: Chronic Heart Failure, hypertension 25 mg  
    
   
   
   
  
 citalopram 40 mg tablet Commonly known as:  Holman Zaki Your last dose was: Your next dose is: Take 40 mg by mouth daily. 40 mg  
    
   
   
   
  
 furosemide 20 mg tablet Commonly known as:  LASIX Your last dose was: Your next dose is: Take 1 Tab by mouth two (2) times a day. 20 mg  
    
   
   
   
  
 ibuprofen 800 mg tablet Commonly known as:  MOTRIN Your last dose was: Your next dose is: Take 1 Tab by mouth two (2) times daily as needed for Pain. Take with full glass of water 800 mg  
    
   
   
   
  
 lidocaine 5 % Commonly known as:  Guerline Helton Your last dose was: Your next dose is:    
   
   
 1 Patch by TransDERmal route daily as needed for Pain. Apply patch to the affected area for 12 hours a day and remove for 12 hours a day. 1 Patch  
    
   
   
   
  
 losartan 100 mg tablet Commonly known as:  COZAAR Your last dose was: Your next dose is: Take 1 Tab by mouth daily. 100 mg  
    
   
   
   
  
 nitroglycerin 0.4 mg SL tablet Commonly known as:  NITROSTAT Your last dose was: Your next dose is:    
   
   
 1 Tab by SubLINGual route every five (5) minutes as needed for Chest Pain (Max dose of 3). 0.4 mg Omeprazole delayed release 20 mg tablet Commonly known as:  PRILOSEC D/R Your last dose was: Your next dose is: Take 2 Tabs by mouth daily. Take 10-20 min before breakfast  
 40 mg  
    
   
   
   
  
  
STOP taking these medications ALPRAZolam 1 mg tablet Commonly known as:  Cameron Davis Where to Get Your Medications Information on where to get these meds will be given to you by the nurse or doctor. ! Ask your nurse or doctor about these medications  
  fluticasone 50 mcg/actuation nasal spray  
 levoFLOXacin 750 mg tablet  
 loratadine 10 mg tablet
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
## 2018-03-30 NOTE — ED TRIAGE NOTES
Pt c/o right 4th finger pain/swelling/redness x 3 days  Pt also short of breath in triage, reported that \"i'm still trying to get my oxygen tank\"-uses 3L [place] home oxygen

## 2018-03-30 NOTE — ED NOTES
TRANSFER - OUT REPORT:    Verbal report given to Juan Amin RN (name) on Carole Macias  being transferred to Telemetry (unit) for routine progression of care       Report consisted of patients Situation, Background, Assessment and   Recommendations(SBAR). Information from the following report(s) SBAR, ED Summary, Procedure Summary, MAR, Recent Results and Cardiac Rhythm NSR was reviewed with the receiving nurse. Lines:   Peripheral IV 03/30/18 Left Antecubital (Active)   Site Assessment Clean, dry, & intact 3/30/2018 12:48 PM   Phlebitis Assessment 0 3/30/2018 12:48 PM   Infiltration Assessment 0 3/30/2018 12:48 PM   Dressing Status Clean, dry, & intact 3/30/2018 12:48 PM   Dressing Type Transparent 3/30/2018 12:48 PM   Hub Color/Line Status Blue;Capped;Flushed;Patent 3/30/2018 12:48 PM   Action Taken Blood drawn 3/30/2018 12:48 PM        Opportunity for questions and clarification was provided.       Patient transported with:   Registered Nurse

## 2018-03-31 LAB
ANION GAP SERPL CALC-SCNC: 9 MMOL/L (ref 5–15)
BUN SERPL-MCNC: 10 MG/DL (ref 6–20)
BUN/CREAT SERPL: 9 (ref 12–20)
CALCIUM SERPL-MCNC: 8.4 MG/DL (ref 8.5–10.1)
CHLORIDE SERPL-SCNC: 105 MMOL/L (ref 97–108)
CO2 SERPL-SCNC: 23 MMOL/L (ref 21–32)
CREAT SERPL-MCNC: 1.09 MG/DL (ref 0.7–1.3)
ERYTHROCYTE [DISTWIDTH] IN BLOOD BY AUTOMATED COUNT: 13.7 % (ref 11.5–14.5)
GLUCOSE SERPL-MCNC: 102 MG/DL (ref 65–100)
HCT VFR BLD AUTO: 45 % (ref 36.6–50.3)
HGB BLD-MCNC: 15 G/DL (ref 12.1–17)
MCH RBC QN AUTO: 29.8 PG (ref 26–34)
MCHC RBC AUTO-ENTMCNC: 33.3 G/DL (ref 30–36.5)
MCV RBC AUTO: 89.3 FL (ref 80–99)
NRBC # BLD: 0 K/UL (ref 0–0.01)
NRBC BLD-RTO: 0 PER 100 WBC
PLATELET # BLD AUTO: 161 K/UL (ref 150–400)
PMV BLD AUTO: 10.9 FL (ref 8.9–12.9)
POTASSIUM SERPL-SCNC: 4.1 MMOL/L (ref 3.5–5.1)
RBC # BLD AUTO: 5.04 M/UL (ref 4.1–5.7)
SODIUM SERPL-SCNC: 137 MMOL/L (ref 136–145)
WBC # BLD AUTO: 10.6 K/UL (ref 4.1–11.1)

## 2018-03-31 PROCEDURE — 74011250636 HC RX REV CODE- 250/636: Performed by: INTERNAL MEDICINE

## 2018-03-31 PROCEDURE — 74011636637 HC RX REV CODE- 636/637: Performed by: INTERNAL MEDICINE

## 2018-03-31 PROCEDURE — 74011250637 HC RX REV CODE- 250/637: Performed by: INTERNAL MEDICINE

## 2018-03-31 PROCEDURE — 36415 COLL VENOUS BLD VENIPUNCTURE: CPT | Performed by: INTERNAL MEDICINE

## 2018-03-31 PROCEDURE — 65270000032 HC RM SEMIPRIVATE

## 2018-03-31 PROCEDURE — 77030012890

## 2018-03-31 PROCEDURE — 74011000250 HC RX REV CODE- 250: Performed by: INTERNAL MEDICINE

## 2018-03-31 PROCEDURE — 77010033678 HC OXYGEN DAILY

## 2018-03-31 PROCEDURE — 85027 COMPLETE CBC AUTOMATED: CPT | Performed by: INTERNAL MEDICINE

## 2018-03-31 PROCEDURE — 80048 BASIC METABOLIC PNL TOTAL CA: CPT | Performed by: INTERNAL MEDICINE

## 2018-03-31 PROCEDURE — 94640 AIRWAY INHALATION TREATMENT: CPT

## 2018-03-31 RX ORDER — BUTALBITAL, ACETAMINOPHEN AND CAFFEINE 50; 325; 40 MG/1; MG/1; MG/1
1 TABLET ORAL
Status: DISCONTINUED | OUTPATIENT
Start: 2018-03-31 | End: 2018-04-01 | Stop reason: HOSPADM

## 2018-03-31 RX ORDER — HYDRALAZINE HYDROCHLORIDE 20 MG/ML
10 INJECTION INTRAMUSCULAR; INTRAVENOUS ONCE
Status: COMPLETED | OUTPATIENT
Start: 2018-03-31 | End: 2018-03-31

## 2018-03-31 RX ORDER — HYDRALAZINE HYDROCHLORIDE 20 MG/ML
10 INJECTION INTRAMUSCULAR; INTRAVENOUS
Status: DISCONTINUED | OUTPATIENT
Start: 2018-03-31 | End: 2018-04-01 | Stop reason: HOSPADM

## 2018-03-31 RX ORDER — NAPROXEN 250 MG/1
500 TABLET ORAL
Status: DISCONTINUED | OUTPATIENT
Start: 2018-03-31 | End: 2018-03-31 | Stop reason: ALTCHOICE

## 2018-03-31 RX ORDER — FLUTICASONE PROPIONATE 50 MCG
2 SPRAY, SUSPENSION (ML) NASAL DAILY
Status: DISCONTINUED | OUTPATIENT
Start: 2018-03-31 | End: 2018-04-01 | Stop reason: HOSPADM

## 2018-03-31 RX ORDER — ACETAMINOPHEN 325 MG/1
650 TABLET ORAL
Status: DISCONTINUED | OUTPATIENT
Start: 2018-03-31 | End: 2018-04-01 | Stop reason: HOSPADM

## 2018-03-31 RX ORDER — LORATADINE 10 MG/1
10 TABLET ORAL
Status: DISCONTINUED | OUTPATIENT
Start: 2018-03-31 | End: 2018-04-01 | Stop reason: HOSPADM

## 2018-03-31 RX ORDER — ALPRAZOLAM 1 MG/1
1 TABLET ORAL
Status: DISCONTINUED | OUTPATIENT
Start: 2018-03-31 | End: 2018-04-01 | Stop reason: HOSPADM

## 2018-03-31 RX ADMIN — CALCIUM CARBONATE-VITAMIN D TAB 500 MG-200 UNIT 1 TABLET: 500-200 TAB at 17:21

## 2018-03-31 RX ADMIN — IPRATROPIUM BROMIDE AND ALBUTEROL SULFATE 3 ML: .5; 3 SOLUTION RESPIRATORY (INHALATION) at 19:42

## 2018-03-31 RX ADMIN — FLUTICASONE PROPIONATE AND SALMETEROL 1 PUFF: 50; 500 POWDER RESPIRATORY (INHALATION) at 20:35

## 2018-03-31 RX ADMIN — LOSARTAN POTASSIUM 100 MG: 50 TABLET ORAL at 08:46

## 2018-03-31 RX ADMIN — CARVEDILOL 25 MG: 12.5 TABLET, FILM COATED ORAL at 08:46

## 2018-03-31 RX ADMIN — CALCIUM CARBONATE-VITAMIN D TAB 500 MG-200 UNIT 1 TABLET: 500-200 TAB at 08:46

## 2018-03-31 RX ADMIN — FLUTICASONE PROPIONATE 2 SPRAY: 50 SPRAY, METERED NASAL at 13:36

## 2018-03-31 RX ADMIN — ASPIRIN 81 MG: 81 TABLET, COATED ORAL at 08:46

## 2018-03-31 RX ADMIN — CARVEDILOL 25 MG: 12.5 TABLET, FILM COATED ORAL at 17:20

## 2018-03-31 RX ADMIN — IPRATROPIUM BROMIDE AND ALBUTEROL SULFATE 3 ML: .5; 3 SOLUTION RESPIRATORY (INHALATION) at 16:35

## 2018-03-31 RX ADMIN — Medication 10 ML: at 13:36

## 2018-03-31 RX ADMIN — LITHIUM CARBONATE 450 MG: 450 TABLET, EXTENDED RELEASE ORAL at 17:20

## 2018-03-31 RX ADMIN — SODIUM CHLORIDE 100 ML/HR: 900 INJECTION, SOLUTION INTRAVENOUS at 19:34

## 2018-03-31 RX ADMIN — IPRATROPIUM BROMIDE AND ALBUTEROL SULFATE 3 ML: .5; 3 SOLUTION RESPIRATORY (INHALATION) at 11:56

## 2018-03-31 RX ADMIN — LEVOFLOXACIN 750 MG: 5 INJECTION, SOLUTION INTRAVENOUS at 13:36

## 2018-03-31 RX ADMIN — CITALOPRAM HYDROBROMIDE 40 MG: 20 TABLET ORAL at 08:46

## 2018-03-31 RX ADMIN — Medication 10 ML: at 08:47

## 2018-03-31 RX ADMIN — PREDNISONE 5 MG: 5 TABLET ORAL at 08:46

## 2018-03-31 RX ADMIN — LITHIUM CARBONATE 450 MG: 450 TABLET, EXTENDED RELEASE ORAL at 08:46

## 2018-03-31 RX ADMIN — PANTOPRAZOLE SODIUM 40 MG: 40 TABLET, DELAYED RELEASE ORAL at 08:45

## 2018-03-31 RX ADMIN — IPRATROPIUM BROMIDE AND ALBUTEROL SULFATE 3 ML: .5; 3 SOLUTION RESPIRATORY (INHALATION) at 07:34

## 2018-03-31 RX ADMIN — FLUTICASONE PROPIONATE AND SALMETEROL 1 PUFF: 50; 500 POWDER RESPIRATORY (INHALATION) at 08:47

## 2018-03-31 RX ADMIN — BUTALBITAL, ACETAMINOPHEN AND CAFFEINE 1 TABLET: 50; 325; 40 TABLET ORAL at 19:34

## 2018-03-31 RX ADMIN — HYDRALAZINE HYDROCHLORIDE 10 MG: 20 INJECTION, SOLUTION INTRAMUSCULAR; INTRAVENOUS at 04:20

## 2018-03-31 RX ADMIN — BUTALBITAL, ACETAMINOPHEN AND CAFFEINE 1 TABLET: 50; 325; 40 TABLET ORAL at 10:35

## 2018-03-31 RX ADMIN — SODIUM CHLORIDE 100 ML/HR: 900 INJECTION, SOLUTION INTRAVENOUS at 08:47

## 2018-03-31 RX ADMIN — ACETAMINOPHEN 650 MG: 325 TABLET, FILM COATED ORAL at 04:20

## 2018-03-31 NOTE — PROGRESS NOTES
TRANSFER - IN REPORT:    Verbal report received from Haley(name) on Denisse Naqvi  being received from Emergency Room(unit) for routine progression of care      Report consisted of patients Situation, Background, Assessment and   Recommendations(SBAR). Information from the following report(s) SBAR, Kardex, ED Summary, Intake/Output, MAR, Accordion, Recent Results and Cardiac Rhythm nsr was reviewed with the receiving nurse. Opportunity for questions and clarification was provided. Assessment will completed upon patients arrival to unit and care assumed. 1922- patient arrived on unit on 2L O2 nasal cannula, complains of shortness of breath r/t walking from stretcher to bed. RR 24, no adventitious sounds noted on auscultation. Advanced oxygen to 3L, which is his home dose. Patient is tachycardic on telemetry into the mid-high 110s. 1930 Bedside and Verbal shift change report given to Domingo Rosales (oncoming nurse) by Cinthya Swartz (offgoing nurse). Report included the following information SBAR, Kardex, ED Summary, MAR, Accordion, Recent Results and Cardiac Rhythm sinus tachycardia.

## 2018-03-31 NOTE — PROGRESS NOTES
Hospitalist Progress Note    NAME: Jonathan Swain   :  1965   MRN:  [id number]   Room Number:  383/33  @ [facility] Summary: 48 y.o. male whom presented on 3/30/2018 with      Assessment / Plan:  Update-  Patient c/o Sob,nasal congestion and headache. States he feel worse as compared to yesterday. Will add Flonase and Claritin. contiue PRN Duonebs and abx. Sepsis:POA 2/2 CAP  Not severe sepsis or shock. Lactic acid normal.  Sepsis criteria-,wbc 15,Pneumonia  Empiric Levaquin(allergic to PCN) day 2  sputum cx-not sent, bld cx-ngtd  urine for  pneumococcal and legionella Ag-await results  CXR PA/LatMediastinal and hilar adenopathy and chronic upper lobe fibrotic change  compatible with the known sarcoidosis are seen. There is possible new patch of left perihilar airspace disease. COPD ON 2 L HOT, not in exacerbation  Chronic resp failure on home oxygen  Tobacco abuse  Prn Duonebs  Patient was counseled extensively on the need to abstain from tobacco, its addictive tendencies, its deleterious effects on the lungs as well as its financial sequelae  Nicotine patch declined     Sarcoidosis  On chronic daily steroids     Bipolar 2 disorder (HCC)  DDD (degenerative disc disease), lumbar     Right 4th digit Paronychia  S/p IND     Body mass index is 27.32 kg/(m^2). Code Status: full  Surrogate Decision Maker:competent  Prophylaxis: Lovenox  Recommended Disposition: Home w/Family     Subjective:     Chief Complaint / Reason for Physician Visit  Antoniette Sicard very congested\". Discussed with RN events overnight.      Review of Systems:  Symptom Y/N Comments  Symptom Y/N Comments   Fever/Chills n   Chest Pain n    Poor Appetite n   Edema n    Cough y   Abdominal Pain n    Sputum y   Joint Pain n    SOB/LAW y   Pruritis/Rash n    Nausea/vomit n   Tolerating PT/OT     Diarrhea n   Tolerating Diet y    Constipation n   Other       Could NOT obtain due to:      Objective: VITALS:   Last 24hrs VS reviewed since prior progress note. Most recent are:  Patient Vitals for the past 24 hrs:   Temp Pulse Resp BP SpO2   03/31/18 1229 98 °F (36.7 °C) 82 20 (!) 131/96 95 %   03/31/18 0801 97.8 °F (36.6 °C) (!) 114 20 (!) 168/103 97 %   03/31/18 0527 - - - (!) 150/93 -   03/31/18 0434 98.5 °F (36.9 °C) (!) 111 26 (!) 143/106 91 %   03/30/18 2342 99.4 °F (37.4 °C) (!) 129 22 (!) 164/96 95 %   03/30/18 2336 - - - - 95 %   03/30/18 1940 98.4 °F (36.9 °C) (!) 115 27 165/89 93 %   03/30/18 1834 - (!) 113 28 - 93 %   03/30/18 1800 - (!) 119 24 172/86 90 %   03/30/18 1732 - - - - 97 %   03/30/18 1607 - (!) 109 - 126/89 96 %   03/30/18 1523 - (!) 106 18 117/76 91 %       Intake/Output Summary (Last 24 hours) at 03/31/18 1421  Last data filed at 03/31/18 0805   Gross per 24 hour   Intake                0 ml   Output              900 ml   Net             -900 ml        PHYSICAL EXAM:  General: WD, WN. Alert, cooperative, no acute distress    EENT:  EOMI. Anicteric sclerae. MMM  Resp:  diminished BS bilaterally, no wheezing or rales. No accessory muscle use  CV:  Regular  rhythm,  No edema  GI:  Soft, Non distended, Non tender.  +Bowel sounds  Neurologic:  Alert and oriented X 3, normal speech,   Psych:   Good insight. Not anxious nor agitated  Skin:  No rashes. No jaundice    Reviewed most current lab test results and cultures  YES  Reviewed most current radiology test results   YES  Review and summation of old records today    NO  Reviewed patient's current orders and MAR    YES  PMH/SH reviewed - no change compared to H&P  ________________________________________________________________________  Care Plan discussed with:    Comments   Patient x    Family      RN x    Care Manager     Consultant                        Multidiciplinary team rounds were held today with , nursing, pharmacist and clinical coordinator.   Patient's plan of care was discussed; medications were reviewed and discharge planning was addressed. ________________________________________________________________________  Total NON critical care TIME:  35   Minutes    Total CRITICAL CARE TIME Spent:   Minutes non procedure based      Comments   >50% of visit spent in counseling and coordination of care     ________________________________________________________________________  Donald Casillas MD     Procedures: see electronic medical records for all procedures/Xrays and details which were not copied into this note but were reviewed prior to creation of Plan. LABS:  I reviewed today's most current labs and imaging studies.   Pertinent labs include:  Recent Labs      03/31/18   0429  03/30/18   1243   WBC  10.6  15.4*   HGB  15.0  17.8*   HCT  45.0  53.2*   PLT  161  179     Recent Labs      03/31/18   0429  03/30/18   1243   NA  137  138   K  4.1  4.2   CL  105  103   CO2  23  25   GLU  102*  90   BUN  10  10   CREA  1.09  1.22   CA  8.4*  10.0   ALB   --   3.6   TBILI   --   1.2*   SGOT   --   50*   ALT   --   87*       Signed: Donald Casillas MD

## 2018-03-31 NOTE — PROGRESS NOTES
Problem: Falls - Risk of  Goal: *Absence of Falls  Document Daphne Fall Risk and appropriate interventions in the flowsheet.    Outcome: Progressing Towards Goal  Fall Risk Interventions:            Medication Interventions: Patient to call before getting OOB

## 2018-03-31 NOTE — PROGRESS NOTES
PTA dose Lithium  mg po BID. Recommended to order baseline Lithium level.  Accepted by MD.  No result yet @ 2030 on 3/30/18  Asked nurse to hold Lithium dose if Lithium level result greater than 1.2 mmol/L

## 2018-03-31 NOTE — PROGRESS NOTES
Contacted hospitalist Dr. Angy Greenfield for prn tylenol for patient c/o H/a 8/10. New order given 650 mg tylenol every 6 hrs prn.

## 2018-03-31 NOTE — PROGRESS NOTES
Bedside and Verbal shift change report given to Cammy rn (oncoming nurse) by Jody Null rn (offgoing nurse). Report included the following information SBAR, Kardex, Intake/Output, MAR, Recent Results, Med Rec Status and Cardiac Rhythm nsr.

## 2018-04-01 VITALS
OXYGEN SATURATION: 93 % | RESPIRATION RATE: 20 BRPM | BODY MASS INDEX: 27.43 KG/M2 | WEIGHT: 185.19 LBS | HEART RATE: 68 BPM | HEIGHT: 69 IN | SYSTOLIC BLOOD PRESSURE: 107 MMHG | DIASTOLIC BLOOD PRESSURE: 69 MMHG | TEMPERATURE: 98.3 F

## 2018-04-01 PROCEDURE — 74011636637 HC RX REV CODE- 636/637: Performed by: INTERNAL MEDICINE

## 2018-04-01 PROCEDURE — 94640 AIRWAY INHALATION TREATMENT: CPT

## 2018-04-01 PROCEDURE — 74011250637 HC RX REV CODE- 250/637: Performed by: INTERNAL MEDICINE

## 2018-04-01 PROCEDURE — 74011000250 HC RX REV CODE- 250: Performed by: INTERNAL MEDICINE

## 2018-04-01 PROCEDURE — 77010033678 HC OXYGEN DAILY

## 2018-04-01 PROCEDURE — 74011250636 HC RX REV CODE- 250/636: Performed by: INTERNAL MEDICINE

## 2018-04-01 RX ORDER — FLUTICASONE PROPIONATE 50 MCG
SPRAY, SUSPENSION (ML) NASAL
Qty: 1 BOTTLE | Refills: 0 | Status: SHIPPED | OUTPATIENT
Start: 2018-04-02

## 2018-04-01 RX ORDER — LORATADINE 10 MG/1
10 TABLET ORAL
Qty: 10 TAB | Refills: 0 | Status: SHIPPED | OUTPATIENT
Start: 2018-04-01 | End: 2019-11-11 | Stop reason: ALTCHOICE

## 2018-04-01 RX ORDER — KETOTIFEN FUMARATE 0.35 MG/ML
1 SOLUTION/ DROPS OPHTHALMIC 2 TIMES DAILY
Status: DISCONTINUED | OUTPATIENT
Start: 2018-04-01 | End: 2018-04-01 | Stop reason: HOSPADM

## 2018-04-01 RX ORDER — LEVOFLOXACIN 750 MG/1
750 TABLET ORAL EVERY 24 HOURS
Qty: 7 TAB | Refills: 0 | Status: SHIPPED | OUTPATIENT
Start: 2018-04-01 | End: 2018-04-08

## 2018-04-01 RX ADMIN — CALCIUM CARBONATE-VITAMIN D TAB 500 MG-200 UNIT 1 TABLET: 500-200 TAB at 09:37

## 2018-04-01 RX ADMIN — CITALOPRAM HYDROBROMIDE 40 MG: 20 TABLET ORAL at 09:37

## 2018-04-01 RX ADMIN — IPRATROPIUM BROMIDE AND ALBUTEROL SULFATE 3 ML: .5; 3 SOLUTION RESPIRATORY (INHALATION) at 12:58

## 2018-04-01 RX ADMIN — CARVEDILOL 25 MG: 12.5 TABLET, FILM COATED ORAL at 09:37

## 2018-04-01 RX ADMIN — PANTOPRAZOLE SODIUM 40 MG: 40 TABLET, DELAYED RELEASE ORAL at 09:37

## 2018-04-01 RX ADMIN — KETOTIFEN FUMARATE 1 DROP: 0.35 SOLUTION OPHTHALMIC at 12:01

## 2018-04-01 RX ADMIN — FLUTICASONE PROPIONATE AND SALMETEROL 1 PUFF: 50; 500 POWDER RESPIRATORY (INHALATION) at 09:44

## 2018-04-01 RX ADMIN — PREDNISONE 5 MG: 5 TABLET ORAL at 09:37

## 2018-04-01 RX ADMIN — LITHIUM CARBONATE 450 MG: 450 TABLET, EXTENDED RELEASE ORAL at 09:37

## 2018-04-01 RX ADMIN — FLUTICASONE PROPIONATE 2 SPRAY: 50 SPRAY, METERED NASAL at 09:44

## 2018-04-01 RX ADMIN — LOSARTAN POTASSIUM 100 MG: 50 TABLET ORAL at 09:37

## 2018-04-01 RX ADMIN — IPRATROPIUM BROMIDE AND ALBUTEROL SULFATE 3 ML: .5; 3 SOLUTION RESPIRATORY (INHALATION) at 09:11

## 2018-04-01 RX ADMIN — SODIUM CHLORIDE 100 ML/HR: 900 INJECTION, SOLUTION INTRAVENOUS at 05:23

## 2018-04-01 RX ADMIN — LEVOFLOXACIN 750 MG: 500 TABLET, FILM COATED ORAL at 13:42

## 2018-04-01 RX ADMIN — ASPIRIN 81 MG: 81 TABLET, COATED ORAL at 09:37

## 2018-04-01 RX ADMIN — BUTALBITAL, ACETAMINOPHEN AND CAFFEINE 1 TABLET: 50; 325; 40 TABLET ORAL at 09:37

## 2018-04-01 NOTE — DISCHARGE INSTRUCTIONS

## 2018-04-01 NOTE — DISCHARGE SUMMARY
Hospitalist Discharge Summary     Patient ID:  Jon Bates  [id number]  48 y.o.  1965    PCP on record: Kimberly Anguiano MD    Admit date: 3/30/2018  Discharge date and time: 4/1/2018      Admission Diagnoses: Sepsis ([facility] [address]  CAP (community acquired pneumonia)    Discharge Diagnoses:    Principal Problem:    Sepsis ([address] (3/30/2018)    Active Problems:    CAP (community acquired pneumonia) (3/30/2018)      Sarcoidosis (1/8/2013)      Bipolar 2 disorder ([facility] [address] (1/8/2013)      DDD (degenerative disc disease), lumbar ()           Hospital Course:   Sepsis:POA resolved 2/2 CAP  Not severe sepsis or shock. Lactic acid normal.  Sepsis criteria-,wbc 15,Pneumonia  Wbc trended down-8  Empiric Levaquin(allergic to PCN) day 3,switch to PO Levaquin. sputum cx-not sent, bld cx-ngtd  urine for  pneumococcal and legionella Ag-await results  CXR PA/LatMediastinal and hilar adenopathy and chronic upper lobe fibrotic change  compatible with the known sarcoidosis are seen. There is possible new patch of left perihilar airspace disease. COPD ON 3 L HOT, not in exacerbation  Chronic resp failure on home oxygen  Tobacco abuse  Prn Duonebs  Patient was counseled extensively on the need to abstain from tobacco, its addictive tendencies, its deleterious effects on the lungs as well as its financial sequelae  Nicotine patch declined     Sarcoidosis  On chronic daily steroids     Bipolar 2 disorder (HCC)  DDD (degenerative disc disease), lumbar     Right 4th digit Paronychia  S/p IND      CONSULTATIONS:  None    Excerpted HPI from H&P of Sofía Montalvo MD:  Patient reports he came in for a right finger pain. He was found to have a small abscess and incision and drainage was done by the ED physician. During the interim he also reported having increased cough and shortness of breath since the past 1 week which has been getting progressively worse.   He reports to me that the entire family was sick last week and he thought he just had some cold and took over-the-counter medications however still has persistent cough. He denies fever and chills. In ED lab work and chest x-ray was done which was significant for pneumonia. He was he met septic criteria he was tachycardic and had WBC 15. Septic protocol was followed in IV Levaquin was given. Of note patient also has pulmonary sarcoidosis and is on 5 mg of prednisone daily. But his ejection fraction improved thereafter. He quit alcohol in 2015. He still occasionally fluids in the past lasting about a month. Had a remote history of alcoholic cardiomyopathy                ______________________________________________________________________  DISCHARGE SUMMARY/HOSPITAL COURSE:  for full details see H&P, daily progress notes, labs, consult notes. _______________________________________________________________________  Patient seen and examined by me on discharge day. Pertinent Findings:  Gen:    Not in distress  Chest: Clear lungs  CVS:   Regular rhythm. No edema  Abd:  Soft, not distended, not tender  Neuro:  Alert with good insight. Oriented to person, place, and time   _______________________________________________________________________  DISCHARGE MEDICATIONS:   Current Discharge Medication List      START taking these medications    Details   levoFLOXacin (LEVAQUIN) 750 mg tablet Take 1 Tab by mouth every twenty-four (24) hours for 7 days. Qty: 7 Tab, Refills: 0      loratadine (CLARITIN) 10 mg tablet Take 1 Tab by mouth daily as needed for Allergies. Qty: 10 Tab, Refills: 0      fluticasone (FLONASE) 50 mcg/actuation nasal spray Use as directed  Qty: 1 Bottle, Refills: 0         CONTINUE these medications which have NOT CHANGED    Details   aspirin delayed-release 81 mg tablet Take 81 mg by mouth daily. lithium carbonate CR (ESKALITH CR) 450 mg CR tablet Take 450 mg by mouth two (2) times a day.       fluticasone-vilanterol (BREO ELLIPTA) 200-25 mcg/dose inhaler Take 1 Puff by inhalation daily. citalopram (CELEXA) 40 mg tablet Take 40 mg by mouth daily. ibuprofen (MOTRIN) 800 mg tablet Take 1 Tab by mouth two (2) times daily as needed for Pain. Take with full glass of water  Qty: 30 Tab, Refills: 0    Associated Diagnoses: Dislocation of temporomandibular joint, subsequent encounter      Omeprazole delayed release (PRILOSEC D/R) 20 mg tablet Take 2 Tabs by mouth daily. Take 10-20 min before breakfast  Qty: 120 Tab, Refills: 12    Associated Diagnoses: Gastroesophageal reflux disease without esophagitis; Current chronic use of systemic steroids      calcium-vitamin D (CALCIUM 500+D) 500 mg(1,250mg) -200 unit per tablet Take 1 Tab by mouth two (2) times daily (with meals). Qty: 120 Tab, Refills: 12    Associated Diagnoses: Gastroesophageal reflux disease without esophagitis; Current chronic use of systemic steroids      predniSONE (DELTASONE) 5 mg tablet 30 mg daily  Indications: sarcoidosis  Qty: 180 Tab, Refills: 4    Associated Diagnoses: Sarcoidosis      furosemide (LASIX) 20 mg tablet Take 1 Tab by mouth two (2) times a day. Qty: 60 Tab, Refills: 0    Associated Diagnoses: SOB (shortness of breath)      lidocaine (LIDODERM) 5 % 1 Patch by TransDERmal route daily as needed for Pain. Apply patch to the affected area for 12 hours a day and remove for 12 hours a day. Qty: 1 Package, Refills: 12    Associated Diagnoses: Chronic low back pain with right-sided sciatica, unspecified back pain laterality      losartan (COZAAR) 100 mg tablet Take 1 Tab by mouth daily. Qty: 90 Tab, Refills: 4    Associated Diagnoses: Essential hypertension; Systolic congestive heart failure, unspecified congestive heart failure chronicity      carvedilol (COREG) 25 mg tablet Take 1 Tab by mouth two (2) times daily (with meals).  Indications: Chronic Heart Failure, hypertension  Qty: 180 Tab, Refills: 4    Associated Diagnoses: Essential hypertension with goal blood pressure less than 140/90; Essential hypertension; Systolic congestive heart failure, unspecified congestive heart failure chronicity      nitroglycerin (NITROSTAT) 0.4 mg SL tablet 1 Tab by SubLINGual route every five (5) minutes as needed for Chest Pain (Max dose of 3). Qty: 1 Bottle, Refills: 3    Associated Diagnoses: Unstable angina ([address] Cardiomyopathy ([address]         STOP taking these medications       ALPRAZolam (XANAX) 1 mg tablet Comments:   Reason for Stopping:         lithium carbonate 150 mg capsule Comments:   Reason for Stopping:               My Recommended Diet, Activity, Wound Care, and follow-up labs are listed in the patient's Discharge Insturctions which I have personally completed and reviewed.     _______________________________________________________________________  DISPOSITION:     Home with Family: x   Home with HH/PT/OT/RN:    SNF/LTC:    RUBÉN:    OTHER:        Condition at Discharge:  Stable  _______________________________________________________________________  Follow up with:   PCP : Andres Dunn MD  Follow-up Information     Follow up With Details [address] [facility] Qing Mahan MD   [address] [place] [address]  [phone]                Total time in minutes spent coordinating this discharge (includes going over instructions, follow-up, prescriptions, and preparing report for sign off to her PCP) :  35 minutes    Signed:  Fransisca Chavez MD

## 2018-04-01 NOTE — PROGRESS NOTES
0710) Bedside shift change report given to Angie Briggs, RN and Pilar Ramírez, POPEYE (oncoming nurse) by Danielle Sky RN (offgoing nurse). Report included the following information SBAR, Kardex, MAR, Accordion, Recent Results and Cardiac Rhythm NSR.   2927) Pt stated Left eye \"felt like something in it. \"  Both eyes red. 454 1601) . Terra Muñoz Reviewed discharge instructions with pt including follow-up appointments, new medications and side effects, medications to continue, medication to discontinue, pneumonia and smoking cessation education, and MyChart information. Pt expressed understanding. IV was removed by Za Pulido, nursing supervisor. 8837) Pt wheeled downstairs on oxygen for discharge. Pt refused wait for portable oxygen to be delivered from home, stated he would be fine. Pt ride home with wife.

## 2018-04-01 NOTE — PROGRESS NOTES
Problem: Falls - Risk of  Goal: *Absence of Falls  Document Daphne Fall Risk and appropriate interventions in the flowsheet.    Outcome: Progressing Towards Goal  Fall Risk Interventions:            Medication Interventions: Teach patient to arise slowly

## 2018-04-02 LAB
FLUID CULTURE, SPNG2: NORMAL
L PNEUMO1 AG UR QL IA: NEGATIVE
ORGANISM ID, SPNG3: NORMAL
PLEASE NOTE, SPNG4: NORMAL
S PNEUM AG SPEC QL LA: NEGATIVE
SPECIMEN SOURCE: NORMAL
SPECIMEN SOURCE: NORMAL
SPECIMEN, SPNG1: NORMAL

## 2018-04-03 ENCOUNTER — TELEPHONE (OUTPATIENT)
Dept: CASE MANAGEMENT | Age: 53
End: 2018-04-03

## 2018-04-03 NOTE — TELEPHONE ENCOUNTER
Care Management Follow-up call  Reviewed chart. Discharged within 2 days. Did not   Need H2H follow-up. Called patient. Was able to reach patient today. Verified for correct patient. Patient did get his prescriptions filled. Said he was taking the ABX. Had not contacted his PCP office. Verified correct PCP. Agreed for me to make him an appointment. Called the office they can see him tomorrow at 10:15 AM.  Patient said he will be able to keep this appointment.      [facility] LYNETTE RN   [phone]

## 2018-04-04 ENCOUNTER — OFFICE VISIT (OUTPATIENT)
Dept: INTERNAL MEDICINE CLINIC | Age: 53
End: 2018-04-04

## 2018-04-04 VITALS
HEIGHT: 69 IN | WEIGHT: 178.3 LBS | DIASTOLIC BLOOD PRESSURE: 87 MMHG | SYSTOLIC BLOOD PRESSURE: 118 MMHG | HEART RATE: 98 BPM | TEMPERATURE: 98 F | RESPIRATION RATE: 19 BRPM | BODY MASS INDEX: 26.41 KG/M2 | OXYGEN SATURATION: 98 %

## 2018-04-04 DIAGNOSIS — J96.11 CHRONIC RESPIRATORY FAILURE WITH HYPOXIA (HCC): Primary | ICD-10-CM

## 2018-04-04 DIAGNOSIS — R06.02 SHORTNESS OF BREATH: ICD-10-CM

## 2018-04-04 DIAGNOSIS — R79.89 ELEVATED LFTS: ICD-10-CM

## 2018-04-04 DIAGNOSIS — D86.9 SARCOID: ICD-10-CM

## 2018-04-04 DIAGNOSIS — Z12.11 SCREEN FOR COLON CANCER: ICD-10-CM

## 2018-04-04 DIAGNOSIS — Z99.81 SUPPLEMENTAL OXYGEN DEPENDENT: ICD-10-CM

## 2018-04-04 DIAGNOSIS — D72.829 LEUKOCYTOSIS, UNSPECIFIED TYPE: ICD-10-CM

## 2018-04-04 DIAGNOSIS — J18.9 COMMUNITY ACQUIRED PNEUMONIA OF LEFT LOWER LOBE OF LUNG: ICD-10-CM

## 2018-04-04 PROBLEM — A41.9 SEPSIS (HCC): Status: RESOLVED | Noted: 2018-03-30 | Resolved: 2018-04-04

## 2018-04-04 LAB
BACTERIA SPEC CULT: NORMAL
BACTERIA SPEC CULT: NORMAL
SERVICE CMNT-IMP: NORMAL
SERVICE CMNT-IMP: NORMAL

## 2018-04-04 NOTE — MR AVS SNAPSHOT
[address] [place] [address] 
 
 
 [address] [id number] 
[phone] Patient: Mitali Clay MRN: [id number] :1965 Visit Information Date & Time Provider Department Dept. Phone Encounter #  
 2018 10:15 AM Teresa Jones MD [facility] and [address] [place] [address] [phone] [id number] Follow-up Instructions Return in about 4 weeks (around 2018) for breathing- pulmonary. Your Appointments 2018  8:30 AM  
Any with Edin Mcleod MD  
[address] [place] [address] and Primary Care [facility]) Appt Note: 90 day follow up  
 [address] [facility]  
  
   
 [address] [id number] Upcoming Health Maintenance Date Due FOBT Q 1 YEAR AGE 50-75 3/9/2017 DTaP/Tdap/Td series (2 - Td) 10/25/2027 Allergies as of 2018  Review Complete On: 2018 By: [place]ena [place] Severity Noted Reaction Type Reactions Pcn [Penicillins] High 2016    Anaphylaxis Shellfish Derived High 2016    Anaphylaxis Iodine  2010    Hives, Swelling Current Immunizations  Reviewed on 1/3/2016 Name Date Influenza Vaccine Whole 2009 TD Vaccine 2009 Not reviewed this visit You Were Diagnosed With   
  
 Codes Comments Chronic respiratory failure with hypoxia (HCC)    -  Primary ICD-10-CM: J96.11 
ICD-9-CM: 518.83, 799.02 Community acquired pneumonia of left lower lobe of lung ([facility] [address]     ICD-10-CM: J18.1 ICD-9-CM: 559 Leukocytosis, unspecified type     ICD-10-CM: D72.829 ICD-9-CM: 288.60 Elevated LFTs     ICD-10-CM: R79.89 ICD-9-CM: 790.6 Sarcoid     ICD-10-CM: D86.9 ICD-9-CM: 135 Supplemental oxygen dependent     ICD-10-CM: Z99.81 ICD-9-CM: V46.2 Shortness of breath     ICD-10-CM: R06.02 
ICD-9-CM: 786.05 Screen for colon cancer     ICD-10-CM: Z12.11 ICD-9-CM: V76.51 Vitals BP Pulse Temp Resp Height(growth percentile) Weight(growth percentile) 118/87 (BP 1 Location: Right arm, BP Patient Position: Sitting) 98 98 °F (36.7 °C) (Oral) 19 5' 9\" (1.753 m) 178 lb 4.8 oz (80.9 kg) SpO2 BMI Smoking Status 98% 26.33 kg/m2 Current Every Day Smoker Vitals History BMI and BSA Data Body Mass Index Body Surface Area  
 26.33 kg/m 2 1.98 m 2 Preferred Pharmacy Pharmacy Name Phone CONSUELO [email or web address], [address] [phone] Your Updated Medication List  
  
   
This list is accurate as of 4/4/18  4:27 PM.  Always use your most recent med list.  
  
  
  
  
 aspirin delayed-release 81 mg tablet Take 81 mg by mouth daily. BREO ELLIPTA 200-25 mcg/dose inhaler Generic drug:  fluticasone-vilanterol Take 1 Puff by inhalation daily. calcium-vitamin D 500 mg(1,250mg) -200 unit per tablet Commonly known as:  CALCIUM 500+D Take 1 Tab by mouth two (2) times daily (with meals). carvedilol 25 mg tablet Commonly known as:  Hank Jeromy Take 1 Tab by mouth two (2) times daily (with meals). Indications: Chronic Heart Failure, hypertension  
  
 citalopram 40 mg tablet Commonly known as:  Sable Deaner Take 40 mg by mouth daily. fluticasone 50 mcg/actuation nasal spray Commonly known as:  Welby Galla Use as directed  
  
 furosemide 20 mg tablet Commonly known as:  LASIX Take 1 Tab by mouth two (2) times a day. ibuprofen 800 mg tablet Commonly known as:  MOTRIN Take 1 Tab by mouth two (2) times daily as needed for Pain. Take with full glass of water  
  
 levoFLOXacin 750 mg tablet Commonly known as:  Abilio Oakhurst Take 1 Tab by mouth every twenty-four (24) hours for 7 days. lidocaine 5 % Commonly known as:  LIDODERM  
1 Patch by TransDERmal route daily as needed for Pain. Apply patch to the affected area for 12 hours a day and remove for 12 hours a day. lithium carbonate  mg CR tablet Commonly known as:  ESKALITH CR Take 450 mg by mouth two (2) times a day. loratadine 10 mg tablet Commonly known as:  Curlee Arms Take 1 Tab by mouth daily as needed for Allergies. losartan 100 mg tablet Commonly known as:  COZAAR Take 1 Tab by mouth daily. nitroglycerin 0.4 mg SL tablet Commonly known as:  NITROSTAT  
1 Tab by SubLINGual route every five (5) minutes as needed for Chest Pain (Max dose of 3). Omeprazole delayed release 20 mg tablet Commonly known as:  PRILOSEC D/R Take 2 Tabs by mouth daily. Take 10-20 min before breakfast  
  
 predniSONE 5 mg tablet Commonly known as:  DELTASONE  
30 mg daily  Indications: sarcoidosis We Performed the Following AMB SUPPLY ORDER [[id number] Custom] Comments:  
 Mobile smaller oxygen tank LONG TERM OXYGEN THERAPY PRESCRIBED [4030F CPT(R)] Comments:  
 Book bag mobile oxygen needed. Chronic oxygen requirement 3L. Secondary to lung Sarcoidosis and METABOLIC PANEL, COMPREHENSIVE [85177 CPT(R)] OCCULT BLOOD, IMMUNOASSAY (FIT) [id number] CPT(R)] Follow-up Instructions Return in about 4 weeks (around 5/2/2018) for breathing- pulmonary. Introducing [facility]! Evangelina Blair introduces [employer] patient portal. Now you can access parts of your medical record, email your doctor's office, and request medication refills online. 1. In your internet browser, go to [email or web address] 2. Click on the First Time User? Click Here link in the Sign In box. You will see the New Member Sign Up page. 3. Enter your [employer] Access Code exactly as it appears below. You will not need to use this code after youve completed the sign-up process. If you do not sign up before the expiration date, you must request a new code. · [employer] Access Code: [email or web address] Expires: 5/21/2018  5:30 PM 
 
 4. Enter the last four digits of your Social Security Number (xxxx) and Date of Birth (mm/dd/yyyy) as indicated and click Submit. You will be taken to the next sign-up page. 5. Create a [employer] ID. This will be your [employer] login ID and cannot be changed, so think of one that is secure and easy to remember. 6. Create a [employer] password. You can change your password at any time. 7. Enter your Password Reset Question and Answer. This can be used at a later time if you forget your password. 8. Enter your e-mail address. You will receive e-mail notification when new information is available in [address] 9. Click Sign Up. You can now view and download portions of your medical record. 10. Click the Download Summary menu link to download a portable copy of your medical information. If you have questions, please visit the Frequently Asked Questions section of the [employer] website. Remember, [employer] is NOT to be used for urgent needs. For medical emergencies, dial 911. Now available from your iPhone and Android! Please provide this summary of care documentation to your next provider. Your primary care clinician is listed as [employer] If you have any questions after today's visit, please call [phone].

## 2018-04-04 NOTE — PROGRESS NOTES
Chief Complaint   Patient presents with   [facility] Follow Up     rm 6 patient was in er for pneumonia      1. Have you been to the ER, urgent care clinic since your last visit? Hospitalized since your last visit? Yes When: Friday Where: [facility] [place] Reason for visit: chest pain and SOB    2. Have you seen or consulted any other health care providers outside of the [address] [place] [address] since your last visit? Include any pap smears or colon screening.  No

## 2018-04-04 NOTE — PROGRESS NOTES
Mr. Tim Benoit is a 48y.o. year old male who had concerns including Hospital Follow Up. HPI:  Chief Complaint   Patient presents with   [facility] Follow Up     rm 6 patient was in er for pneumonia      Had flu, and PNA - with elevated wbc and low oxygen. Labored breathing with SOB persists. Had left finger lanced on left 4th finger. Hospital Discharge EMR summary listed:     Admit date: 3/30/2018  Discharge date and time: 4/1/2018        Admission Diagnoses: Sepsis ([facility]  CAP (community acquired pneumonia)     Discharge Diagnoses:    Principal Problem:    Sepsis ([address] (3/30/2018)     Active Problems:    CAP (community acquired pneumonia) (3/30/2018)       Sarcoidosis (1/8/2013)       Bipolar 2 disorder ([facility] (1/8/2013)       DDD (degenerative disc disease), lumbar ()              Hospital Course:   Sepsis:POA resolved 2/2 CAP  Not severe sepsis or shock. Lactic acid normal.  Sepsis criteria-,wbc 15,Pneumonia  Wbc trended down-8  Empiric Levaquin(allergic to PCN) day 3,switch to PO Levaquin.   sputum cx-not sent, bld cx-ngtd  urine for  pneumococcal and legionella Ag-await results  CXR PA/LatMediastinal and hilar adenopathy and chronic upper lobe fibrotic change  compatible with the known sarcoidosis are seen.    There is possible new patch of left perihilar airspace disease.     COPD ON 3 L HOT, not in exacerbation  Chronic resp failure on home oxygen  Tobacco abuse  Prn Duonebs  Patient was counseled extensively on the need to abstain from tobacco, its addictive tendencies, its deleterious effects on the lungs as well as its financial sequelae  Nicotine patch declined     Sarcoidosis  On chronic daily steroids     Bipolar 2 disorder (HCC)  DDD (degenerative disc disease), lumbar     Right 4th digit Paronychia  S/p IND         Past Medical History:   Diagnosis Date    Asthma     Bipolar disorder (HCC)     Bi-polar, Anxiety    Cataracts, bilateral     DDD (degenerative disc disease)     Fracture of left humerus     non operative    Glaucoma     Heart failure (HCC)     Degenerated heart failure    Hypertension     Ill-defined condition 2000    DX WITH SARCODOSIS    Ill-defined condition     USES O2 AT 3;30AM TO 5 AM EVERDAY & AS NEEDED    Ill-defined condition 2016    HEP C    Other ill-defined conditions(799.89)     Glaucoma    Sarcoid     Sarcoidosis     lung      Current Outpatient Prescriptions   Medication Sig Dispense    levoFLOXacin (LEVAQUIN) 750 mg tablet Take 1 Tab by mouth every twenty-four (24) hours for 7 days. 7 Tab    loratadine (CLARITIN) 10 mg tablet Take 1 Tab by mouth daily as needed for Allergies. 10 Tab    fluticasone (FLONASE) 50 mcg/actuation nasal spray Use as directed 1 Bottle    aspirin delayed-release 81 mg tablet Take 81 mg by mouth daily.  lithium carbonate CR (ESKALITH CR) 450 mg CR tablet Take 450 mg by mouth two (2) times a day.  fluticasone-vilanterol (BREO ELLIPTA) 200-25 mcg/dose inhaler Take 1 Puff by inhalation daily.  citalopram (CELEXA) 40 mg tablet Take 40 mg by mouth daily.  ibuprofen (MOTRIN) 800 mg tablet Take 1 Tab by mouth two (2) times daily as needed for Pain. Take with full glass of water 30 Tab    Omeprazole delayed release (PRILOSEC D/R) 20 mg tablet Take 2 Tabs by mouth daily. Take 10-20 min before breakfast 120 Tab    calcium-vitamin D (CALCIUM 500+D) 500 mg(1,250mg) -200 unit per tablet Take 1 Tab by mouth two (2) times daily (with meals). 120 Tab    predniSONE (DELTASONE) 5 mg tablet 30 mg daily  Indications: sarcoidosis (Patient taking differently: Take 5 mg by mouth daily. 30 mg daily  Indications: sarcoidosis) 180 Tab    furosemide (LASIX) 20 mg tablet Take 1 Tab by mouth two (2) times a day. 60 Tab    lidocaine (LIDODERM) 5 % 1 Patch by TransDERmal route daily as needed for Pain. Apply patch to the affected area for 12 hours a day and remove for 12 hours a day.  1 Package    losartan (COZAAR) 100 mg tablet Take 1 Tab by mouth daily. 90 Tab    carvedilol (COREG) 25 mg tablet Take 1 Tab by mouth two (2) times daily (with meals). Indications: Chronic Heart Failure, hypertension 180 Tab    nitroglycerin (NITROSTAT) 0.4 mg SL tablet 1 Tab by SubLINGual route every five (5) minutes as needed for Chest Pain (Max dose of 3). 1 Bottle     No current facility-administered medications for this visit. Reviewed PmHx, RxHx, FmHx, SocHx, AllgHx and updated and dated in the chart. ROS: Negative except for BOLD  General: fever, chills, fatigue  Respiratory: cough, SOB, wheezing  Cardiovascular:  CP, palpitation, LAW, edema   Gastrointestinal: N/V/D, bleeding  Genito-Urinary: dysuria, hematuria  Musculoskeletal: muscle weakness, pain, swelling    OBJECTIVE:   Visit Vitals    /87 (BP 1 Location: Right arm, BP Patient Position: Sitting)    Pulse (!) 104    Temp 98 °F (36.7 °C) (Oral)    Resp 19    Ht 5' 9\" (1.753 m)    Wt 178 lb 4.8 oz (80.9 kg)    SpO2 90%    BMI 26.33 kg/m2     GEN: The patient appears well, alert, oriented x 3, in no distress. ENT: bilateral TM and canal normal.  Neck supple. No adenopathy or thyromegaly. HE. Lungs: clear bilaterally, good air entry, no wheezes, rhonchi or rales. Cardiovascular: regular rate and rhythm. S1 and S2 normal, no murmurs,  Abdomen: + BS, soft without tenderness, guarding, rebound, mass or organomegaly. Extremities: no edema, normal peripheral pulses. Neurological: normal, gross sensory and motor in tact without focal findings.     Results for orders placed or performed during the hospital encounter of 03/30/18   CULTURE, BLOOD   Result Value Ref Range    Special Requests: NO SPECIAL REQUESTS      Culture result: NO GROWTH 5 DAYS     CULTURE, BLOOD   Result Value Ref Range    Special Requests: NO SPECIAL REQUESTS      Culture result: NO GROWTH 5 DAYS     LEGIONELLA PNEUMOPHILA AG, URINE   Result Value Ref Range    Source URINE      L pneumophila S1 Ag, urine NEGATIVE  NEGATIVE     S. PNEUMO AG, UR/CSF   Result Value Ref Range    Source URINE, STEFAN     Specimen Urine     Streptococcus pneumoniae Ag NEGATIVE  NEGATIVE      Fluid culture Not Indicated     Organism ID Not indicated. Please note Comment     CBC WITH AUTOMATED DIFF   Result Value Ref Range    WBC 15.4 (H) 4.1 - 11.1 K/uL    RBC 5.95 (H) 4.10 - 5.70 M/uL    HGB 17.8 (H) 12.1 - 17.0 g/dL    HCT 53.2 (H) 36.6 - 50.3 %    MCV 89.4 80.0 - 99.0 FL    MCH 29.9 26.0 - 34.0 PG    MCHC 33.5 30.0 - 36.5 g/dL    RDW 14.0 11.5 - 14.5 %    PLATELET 247 113 - 652 K/uL    MPV 11.5 8.9 - 12.9 FL    NRBC 0.0 0  WBC    ABSOLUTE NRBC 0.00 0.00 - 0.01 K/uL    NEUTROPHILS 72 32 - 75 %    LYMPHOCYTES 16 12 - 49 %    MONOCYTES 10 5 - 13 %    EOSINOPHILS 1 0 - 7 %    BASOPHILS 0 0 - 1 %    IMMATURE GRANULOCYTES 1 (H) 0.0 - 0.5 %    ABS. NEUTROPHILS 11.1 (H) 1.8 - 8.0 K/UL    ABS. LYMPHOCYTES 2.5 0.8 - 3.5 K/UL    ABS. MONOCYTES 1.5 (H) 0.0 - 1.0 K/UL    ABS. EOSINOPHILS 0.2 0.0 - 0.4 K/UL    ABS. BASOPHILS 0.0 0.0 - 0.1 K/UL    ABS. IMM. GRANS. 0.1 (H) 0.00 - 0.04 K/UL    DF AUTOMATED     METABOLIC PANEL, COMPREHENSIVE   Result Value Ref Range    Sodium 138 136 - 145 mmol/L    Potassium 4.2 3.5 - 5.1 mmol/L    Chloride 103 97 - 108 mmol/L    CO2 25 21 - 32 mmol/L    Anion gap 10 5 - 15 mmol/L    Glucose 90 65 - 100 mg/dL    BUN 10 6 - 20 MG/DL    Creatinine 1.22 0.70 - 1.30 MG/DL    BUN/Creatinine ratio 8 (L) 12 - 20      GFR est AA >60 >60 ml/min/1.73m2    GFR est non-AA >60 >60 ml/min/1.73m2    Calcium 10.0 8.5 - 10.1 MG/DL    Bilirubin, total 1.2 (H) 0.2 - 1.0 MG/DL    ALT (SGPT) 87 (H) 12 - 78 U/L    AST (SGOT) 50 (H) 15 - 37 U/L    Alk.  phosphatase 463 (H) 45 - 117 U/L    Protein, total 8.3 (H) 6.4 - 8.2 g/dL    Albumin 3.6 3.5 - 5.0 g/dL    Globulin 4.7 (H) 2.0 - 4.0 g/dL    A-G Ratio 0.8 (L) 1.1 - 2.2     LACTIC ACID   Result Value Ref Range    Lactic acid 1.5 0.4 - 2.0 MMOL/L   LITHIUM   Result Value Ref Range    Lithium level 0.35 (L) 0.60 - 1.20 MMOL/L    Reported dose date: NOT PROVIDED      Reported dose time: NOT PROVIDED      Reported dose: NOT PROVIDED UNITS   METABOLIC PANEL, BASIC   Result Value Ref Range    Sodium 137 136 - 145 mmol/L    Potassium 4.1 3.5 - 5.1 mmol/L    Chloride 105 97 - 108 mmol/L    CO2 23 21 - 32 mmol/L    Anion gap 9 5 - 15 mmol/L    Glucose 102 (H) 65 - 100 mg/dL    BUN 10 6 - 20 MG/DL    Creatinine 1.09 0.70 - 1.30 MG/DL    BUN/Creatinine ratio 9 (L) 12 - 20      GFR est AA >60 >60 ml/min/1.73m2    GFR est non-AA >60 >60 ml/min/1.73m2    Calcium 8.4 (L) 8.5 - 10.1 MG/DL   CBC W/O DIFF   Result Value Ref Range    WBC 10.6 4.1 - 11.1 K/uL    RBC 5.04 4.10 - 5.70 M/uL    HGB 15.0 12.1 - 17.0 g/dL    HCT 45.0 36.6 - 50.3 %    MCV 89.3 80.0 - 99.0 FL    MCH 29.8 26.0 - 34.0 PG    MCHC 33.3 30.0 - 36.5 g/dL    RDW 13.7 11.5 - 14.5 %    PLATELET 565 054 - 484 K/uL    MPV 10.9 8.9 - 12.9 FL    NRBC 0.0 0  WBC    ABSOLUTE NRBC 0.00 0.00 - 0.01 K/uL   GLUCOSE, POC   Result Value Ref Range    Glucose (POC) 103 (H) 65 - 100 mg/dL    Performed by [place]          Assessment/ Plan:       ICD-10-CM ICD-9-CM    1. Chronic respiratory failure with hypoxia (HCC) J96.11 518.83 LONG TERM OXYGEN THERAPY PRESCRIBED     883.42 METABOLIC PANEL, COMPREHENSIVE      AMB SUPPLY ORDER   2. Community acquired pneumonia of left lower lobe of lung ([facility] J18.1 481 LONG TERM OXYGEN THERAPY PRESCRIBED      METABOLIC PANEL, COMPREHENSIVE      AMB SUPPLY ORDER   3. Leukocytosis, unspecified type D72.829 288.60    4. Elevated LFTs R79.89 790.6    5. Sarcoid D86.9 135    6. Supplemental oxygen dependent Z99.81 V46.2 LONG TERM OXYGEN THERAPY PRESCRIBED      AMB SUPPLY ORDER   7. Shortness of breath R06.02 786.05    8. Screen for colon cancer Z12.11 V76.51 OCCULT BLOOD, IMMUNOASSAY (FIT)     Order for smaller mobile oxygen carrying needed.      I have discussed the diagnosis with the patient and the intended plan as seen in the above orders. The patient has received an after-visit summary and questions were answered concerning future plans. Medication Side Effects and Warnings were discussed with patient. Follow-up Disposition:  Return in about 4 weeks (around 5/2/2018) for breathing- pulmonary.       David Dai MD

## 2018-04-05 LAB
ALBUMIN SERPL-MCNC: 4.1 G/DL (ref 3.5–5.5)
ALBUMIN/GLOB SERPL: 1.3 {RATIO} (ref 1.2–2.2)
ALP SERPL-CCNC: 423 IU/L (ref 39–117)
ALT SERPL-CCNC: 42 IU/L (ref 0–44)
AST SERPL-CCNC: 37 IU/L (ref 0–40)
BILIRUB SERPL-MCNC: 0.6 MG/DL (ref 0–1.2)
BUN SERPL-MCNC: 11 MG/DL (ref 6–24)
BUN/CREAT SERPL: 9 (ref 9–20)
CALCIUM SERPL-MCNC: 9.8 MG/DL (ref 8.7–10.2)
CHLORIDE SERPL-SCNC: 101 MMOL/L (ref 96–106)
CO2 SERPL-SCNC: 24 MMOL/L (ref 18–29)
CREAT SERPL-MCNC: 1.28 MG/DL (ref 0.76–1.27)
GFR SERPLBLD CREATININE-BSD FMLA CKD-EPI: 64 ML/MIN/1.73
GFR SERPLBLD CREATININE-BSD FMLA CKD-EPI: 74 ML/MIN/1.73
GLOBULIN SER CALC-MCNC: 3.2 G/DL (ref 1.5–4.5)
GLUCOSE SERPL-MCNC: 91 MG/DL (ref 65–99)
POTASSIUM SERPL-SCNC: 4.3 MMOL/L (ref 3.5–5.2)
PROT SERPL-MCNC: 7.3 G/DL (ref 6–8.5)
SODIUM SERPL-SCNC: 142 MMOL/L (ref 134–144)

## 2018-04-05 NOTE — PROGRESS NOTES
Notified by physician: Kidney function recovering. Continue with increased Water intake- 90 ounces of water daily. Alkaline phosphatase is a liver enzyme- significantly elevated, and may be due to recent illness, however its been elevated 4 months ago. It may be classically related to sarcoid involvement of the liver.      Nurse please Call add on lab to [facility]- Fractionated alkaline phosphatase due to elevated alk phos

## 2018-04-06 LAB — SPECIMEN STATUS REPORT, ROLRST: NORMAL

## 2018-04-12 LAB
ALP BONE CFR SERPL: 29 % (ref 12–68)
ALP INTEST CFR SERPL: 0 % (ref 0–18)
ALP LIVER CFR SERPL: 71 % (ref 13–88)
ALP SERPL-CCNC: 417 IU/L (ref 39–117)
SPECIMEN STATUS REPORT, ROLRST: NORMAL

## 2018-04-16 DIAGNOSIS — D86.9 SARCOID: Primary | ICD-10-CM

## 2018-04-16 DIAGNOSIS — R74.8 ELEVATED ALKALINE PHOSPHATASE LEVEL: ICD-10-CM

## 2018-04-16 NOTE — PROGRESS NOTES
Referral made to Hepatologist to review elevated liver enzyme results, and be sure no further investigation required, given known hx of Sarcoidosis and previous liver imaging in 2016.    Lilia Curz MD

## 2018-04-16 NOTE — PROGRESS NOTES
I called patient and he is going to go the the liver specialist and will await the referral coordinators call.

## 2018-04-17 DIAGNOSIS — I10 ESSENTIAL HYPERTENSION: ICD-10-CM

## 2018-04-17 DIAGNOSIS — I10 ESSENTIAL HYPERTENSION WITH GOAL BLOOD PRESSURE LESS THAN 140/90: ICD-10-CM

## 2018-04-17 RX ORDER — LOSARTAN POTASSIUM 100 MG/1
100 TABLET ORAL DAILY
Qty: 90 TAB | Refills: 4 | Status: SHIPPED | OUTPATIENT
Start: 2018-04-17 | End: 2018-09-19 | Stop reason: SDUPTHER

## 2018-04-17 RX ORDER — CARVEDILOL 25 MG/1
25 TABLET ORAL 2 TIMES DAILY WITH MEALS
Qty: 180 TAB | Refills: 1 | Status: SHIPPED | OUTPATIENT
Start: 2018-04-17 | End: 2018-06-14 | Stop reason: SDUPTHER

## 2018-06-14 ENCOUNTER — OFFICE VISIT (OUTPATIENT)
Dept: INTERNAL MEDICINE CLINIC | Age: 53
End: 2018-06-14

## 2018-06-14 VITALS
SYSTOLIC BLOOD PRESSURE: 153 MMHG | OXYGEN SATURATION: 93 % | RESPIRATION RATE: 20 BRPM | TEMPERATURE: 97.9 F | HEIGHT: 69 IN | BODY MASS INDEX: 27.03 KG/M2 | DIASTOLIC BLOOD PRESSURE: 111 MMHG | HEART RATE: 91 BPM | WEIGHT: 182.5 LBS

## 2018-06-14 DIAGNOSIS — I10 ESSENTIAL HYPERTENSION WITH GOAL BLOOD PRESSURE LESS THAN 140/90: ICD-10-CM

## 2018-06-14 DIAGNOSIS — D86.9 SARCOIDOSIS: ICD-10-CM

## 2018-06-14 DIAGNOSIS — I10 ESSENTIAL HYPERTENSION: ICD-10-CM

## 2018-06-14 RX ORDER — FLUTICASONE FUROATE AND VILANTEROL 200; 25 UG/1; UG/1
1 POWDER RESPIRATORY (INHALATION) DAILY
Qty: 60 EACH | Refills: 6 | Status: SHIPPED | OUTPATIENT
Start: 2018-06-14

## 2018-06-14 RX ORDER — CARVEDILOL 25 MG/1
25 TABLET ORAL 2 TIMES DAILY WITH MEALS
Qty: 180 TAB | Refills: 1 | Status: SHIPPED | OUTPATIENT
Start: 2018-06-14 | End: 2018-09-06 | Stop reason: SDUPTHER

## 2018-06-14 RX ORDER — PREDNISONE 5 MG/1
5 TABLET ORAL DAILY
Qty: 30 TAB | Refills: 3 | Status: SHIPPED | OUTPATIENT
Start: 2018-06-14 | End: 2018-09-26 | Stop reason: SDUPTHER

## 2018-06-14 NOTE — MR AVS SNAPSHOT
[address] [place] [address] [place] [address] [id number] 
[phone] Patient: Padmini Eisenberg MRN: [id number] :[id number] Visit Information Date & Time Provider Department Dept. Phone Encounter #  
 2018 10:00 AM Milo Chan MD [facility] and [place] [id number] Follow-up Instructions Return in about 3 months (around 2018) for Blood Pressure Check. Follow-up and Disposition History Your Appointments 2018 11:05 AM  
New Patient with Love Adamson. Judith Laughlin NP [address] ([facility]) Appt Note: NP, Sarcoid, Elevated Alkaline Phosphatase, Anuradha Queen MD [facility]; NP, Sarcoid, Elevated Alkaline Phosphatase, Anuradha Queen MD [facility]; r/s NP, Sarcoid, Elevated Alkaline Phosphatase, Anuradha Queen MD [facility] [address] [facility] [phone] [place] [id number]  
[address] [facility] [address] Upcoming Health Maintenance Date Due FOBT Q 1 YEAR AGE 50-75 10/14/2018* Pneumococcal 19-64 Highest Risk (1 of 3 - PCV13) 2019* Influenza Age 5 to Adult 2018 DTaP/Tdap/Td series (2 - Td) 10/25/2027 *Topic was postponed. The date shown is not the original due date. Allergies as of 2018  Review Complete On: 2018 By: Milo Chan MD  
  
 Severity Noted Reaction Type Reactions Pcn [Penicillins] High 2016    Anaphylaxis Shellfish Derived High 2016    Anaphylaxis Iodine  2010    Hives, Swelling Current Immunizations  Reviewed on 1/3/2016 Name Date Influenza Vaccine Whole 2009 TD Vaccine 2009 Not reviewed this visit You Were Diagnosed With   
  
 Codes Comments Sarcoidosis     ICD-10-CM: D86.9 ICD-9-CM: 135 Essential hypertension with goal blood pressure less than 140/90     ICD-10-CM: I10 
ICD-9-CM: 401.9 Essential hypertension     ICD-10-CM: I10 
ICD-9-CM: 401.9 Vitals BP Pulse Temp Resp Height(growth percentile) Weight(growth percentile) (!) 153/111 (BP 1 Location: Right arm, BP Patient Position: Sitting) 91 97.9 °F (36.6 °C) (Oral) 20 5' 9\" (1.753 m) 182 lb 8 oz (82.8 kg) SpO2 BMI Smoking Status 93% 26.95 kg/m2 Current Every Day Smoker Vitals History BMI and BSA Data Body Mass Index Body Surface Area  
 26.95 kg/m 2 2.01 m 2 Preferred Pharmacy Pharmacy Name Phone CONSUELO [email or web address] [place], [address] [phone] Your Updated Medication List  
  
   
This list is accurate as of 6/14/18 10:45 AM.  Always use your most recent med list.  
  
  
  
  
 aspirin delayed-release 81 mg tablet Take 81 mg by mouth daily. calcium-vitamin D 500 mg(1,250mg) -200 unit per tablet Commonly known as:  CALCIUM 500+D Take 1 Tab by mouth two (2) times daily (with meals). carvedilol 25 mg tablet Commonly known as:  Norma Trujillo Take 1 Tab by mouth two (2) times daily (with meals). Indications: chronic heart failure, hypertension  
  
 citalopram 40 mg tablet Commonly known as:  Drena Garett Take 40 mg by mouth daily. fluticasone 50 mcg/actuation nasal spray Commonly known as:  Av Calk Use as directed  
  
 fluticasone-vilanterol 200-25 mcg/dose inhaler Commonly known as:  BREO ELLIPTA Take 1 Puff by inhalation daily. furosemide 20 mg tablet Commonly known as:  LASIX Take 1 Tab by mouth two (2) times a day. ibuprofen 800 mg tablet Commonly known as:  MOTRIN Take 1 Tab by mouth two (2) times daily as needed for Pain. Take with full glass of water  
  
 lidocaine 5 % Commonly known as:  LIDODERM  
1 Patch by TransDERmal route daily as needed for Pain. Apply patch to the affected area for 12 hours a day and remove for 12 hours a day. lithium carbonate  mg CR tablet Commonly known as:  ESKALITH CR Take 450 mg by mouth two (2) times a day. loratadine 10 mg tablet Commonly known as:  Beather Genera Take 1 Tab by mouth daily as needed for Allergies. losartan 100 mg tablet Commonly known as:  COZAAR Take 1 Tab by mouth daily. nitroglycerin 0.4 mg SL tablet Commonly known as:  NITROSTAT  
1 Tab by SubLINGual route every five (5) minutes as needed for Chest Pain (Max dose of 3). Omeprazole delayed release 20 mg tablet Commonly known as:  PRILOSEC D/R Take 2 Tabs by mouth daily. Take 10-20 min before breakfast  
  
 predniSONE 5 mg tablet Commonly known as:  Leverne Ej Take 1 Tab by mouth daily. 30 mg daily  Indications: sarcoidosis Prescriptions Sent to Pharmacy Refills  
 predniSONE (DELTASONE) 5 mg tablet 3 Sig: Take 1 Tab by mouth daily. 30 mg daily  Indications: sarcoidosis Class: Normal  
 Pharmacy: [email or web address] [address] Ph #: [phone] Route: Oral  
 carvedilol (COREG) 25 mg tablet 1 Sig: Take 1 Tab by mouth two (2) times daily (with meals). Indications: chronic heart failure, hypertension Class: Normal  
 Pharmacy: [email or web address] Ph #: [phone] Route: Oral  
 fluticasone-vilanterol (BREO ELLIPTA) 200-25 mcg/dose inhaler 6 Sig: Take 1 Puff by inhalation daily. Class: Normal  
 Pharmacy: [email or web address] [address] Ph #: [phone] Route: Inhalation Follow-up Instructions Return in about 3 months (around 9/14/2018) for Blood Pressure Check. Introducing [facility]! [facility] introduces [employer] patient portal. Now you can access parts of your medical record, email your doctor's office, and request medication refills online. 1. In your internet browser, go to [email or web address] 2. Click on the First Time User? Click Here link in the Sign In box. You will see the New Member Sign Up page. 3. Enter your [employer] Access Code exactly as it appears below. You will not need to use this code after youve completed the sign-up process. If you do not sign up before the expiration date, you must request a new code. · [employer] Access Code: [email or web address] Expires: 9/12/2018 10:45 AM 
 
4. Enter the last four digits of your Social Security Number (xxxx) and Date of Birth (mm/dd/yyyy) as indicated and click Submit. You will be taken to the next sign-up page. 5. Create a [employer] ID. This will be your [employer] login ID and cannot be changed, so think of one that is secure and easy to remember. 6. Create a [employer] password. You can change your password at any time. 7. Enter your Password Reset Question and Answer. This can be used at a later time if you forget your password. 8. Enter your e-mail address. You will receive e-mail notification when new information is available in [address] 9. Click Sign Up. You can now view and download portions of your medical record. 10. Click the Download Summary menu link to download a portable copy of your medical information. If you have questions, please visit the Frequently Asked Questions section of the [employer] website. Remember, [employer] is NOT to be used for urgent needs. For medical emergencies, dial 911. Now available from your iPhone and Android! Please provide this summary of care documentation to your next provider. Your primary care clinician is listed as Karin Martin. If you have any questions after today's visit, please call [phone].

## 2018-06-14 NOTE — PROGRESS NOTES
Chief Complaint   Patient presents with    Blood Pressure Check     Pt who presents for follow up of a pre-existing problem of hypertension. Diet and Lifestyle: not attempting to follow a low fat, low cholesterol diet, not attempting to follow a low sodium diet, exercises sporadically, smoker currently  Home BP Monitoring: is not measured at home  Use of agents associated with hypertension: none. Cardiovascular ROS: taking medications as instructed, no medication side effects noted, no TIA's, no chest pain on exertion, no dyspnea on exertion, no swelling of ankles. New concerns: He has not taken his blood pressure meds for 3 days, needing medications refilled. Reviewed and agree with Nurse Note and duplicated in this note. Reviewed PmHx, RxHx, FmHx, SocHx, AllgHx and updated and dated in the chart.     Family History   Problem Relation Age of Onset    Cancer Father     Diabetes Father     Heart Disease Father     Hypertension Father     Stroke Father     Diabetes Mother     Hypertension Mother        Past Medical History:   Diagnosis Date    Asthma     Bipolar disorder ([address]     Bi-polar, Anxiety    Cataracts, bilateral     DDD (degenerative disc disease)     Fracture of left humerus     non operative    Glaucoma     Heart failure ([address]     Degenerated heart failure    Hypertension     Ill-defined condition 2000    DX WITH SARCODOSIS    Ill-defined condition     USES O2 AT 3;30AM TO 5 AM EVERDAY & AS NEEDED    Ill-defined condition 2016    HEP C    Other ill-defined conditions(799.89)     Glaucoma    Sarcoid     Sarcoidosis     lung       Social History     Social History    Marital status: SINGLE     Spouse name: N/A    Number of children: N/A    Years of education: N/A     Social History Main Topics    Smoking status: Current Every Day Smoker     Packs/day: 0.25     Years: 6.00     Last attempt to quit: 2/6/2016    Smokeless tobacco: Never Used    Alcohol use No Comment: quit 2 years or so ago    Drug use: No    Sexual activity: Yes     Partners: Female     Birth control/ protection: Condom     Other Topics Concern    None     Social History Narrative    ** Merged History Encounter **        Father,  Son getting  soon and he's assisting with the wedding. Review of Systems - negative except as listed above      Objective:     Vitals:    06/14/18 1024   BP: (!) 153/111   Pulse: 91   Resp: 20   Temp: 97.9 °F (36.6 °C)   TempSrc: Oral   SpO2: 93%   Weight: 182 lb 8 oz (82.8 kg)   Height: 5' 9\" (1.753 m)       Physical Examination: General appearance - alert, well appearing, and in no distress  Eyes - pupils equal and reactive, extraocular eye movements intact  Ears - bilateral TM's and external ear canals normal  Nose - normal and patent, no erythema, discharge or polyps  Mouth - mucous membranes moist, pharynx normal without lesions  Neck - supple, no significant adenopathy  Chest - clear to auscultation, no wheezes, rales or rhonchi, symmetric air entry  Heart - normal rate, regular rhythm, normal S1, S2, no murmurs, rubs, clicks or gallops  Abdomen - soft, nontender, nondistended, no masses or organomegaly  Neurological - alert, oriented, normal speech, no focal findings or movement disorder noted  Musculoskeletal - no joint tenderness, deformity or swelling  Extremities - peripheral pulses normal, no pedal edema, no clubbing or cyanosis  Skin - normal coloration and turgor, no rashes, no suspicious skin lesions noted    Assessment/ Plan:   Diagnoses and all orders for this visit:    1. Sarcoidosis  -     predniSONE (DELTASONE) 5 mg tablet; 30 mg daily  Indications: sarcoidosis    2. Essential hypertension with goal blood pressure less than 140/90  -     carvedilol (COREG) 25 mg tablet; Take 1 Tab by mouth two (2) times daily (with meals). Indications: chronic heart failure, hypertension    3.  Essential hypertension  -     carvedilol (COREG) 25 mg tablet; Take 1 Tab by mouth two (2) times daily (with meals). Indications: chronic heart failure, hypertension    Other orders  -     fluticasone-vilanterol (BREO ELLIPTA) 200-25 mcg/dose inhaler; Take 1 Puff by inhalation daily. Patient is out of meds currently, will return to clinic for next visit blood pressure check  Follow-up Disposition: Not on File  Patient was informed/counseled to:    1) Remember to stay active and/or exercise regularly (I suggest 30-45 minutes daily)   2) For reliable dietary information, go to www. [employer]ATRIGHT.org. You may wish to consider seeing the nutritionist at [facility] [phone], also consider the [address] 3) I routinely suggest a complete physical exam once each year (your birth month)  I have discussed the diagnosis with the patient and the intended plan as seen in the above orders. The patient has received an after-visit summary and questions were answered concerning future plans. Medication Side Effects and Warnings were discussed with patient: yes  Patient Labs were reviewed and or requested: yes  Patient Past Records were reviewed and or requested  yes  I have discussed the diagnosis with the patient and the intended plan as seen in the above orders. The patient has received an after-visit summary and questions were answered concerning future plans. Pt agrees to call or return to clinic and/or go to closest ER with any worsening of symptoms. This may include, but not limited to increased fever (>100.4) with NSAIDS or Tylenol, increased edema, confusion, rash, worsening of presenting symptoms.

## 2018-06-29 ENCOUNTER — OFFICE VISIT (OUTPATIENT)
Dept: HEMATOLOGY | Age: 53
End: 2018-06-29

## 2018-06-29 VITALS
WEIGHT: 181.2 LBS | OXYGEN SATURATION: 90 % | SYSTOLIC BLOOD PRESSURE: 122 MMHG | TEMPERATURE: 96.2 F | DIASTOLIC BLOOD PRESSURE: 84 MMHG | BODY MASS INDEX: 26.76 KG/M2 | HEART RATE: 94 BPM

## 2018-06-29 DIAGNOSIS — I21.A9 OTHER TYPE OF ACUTE MYOCARDIAL INFARCTION (HCC): ICD-10-CM

## 2018-06-29 DIAGNOSIS — R74.8 ELEVATED ALKALINE PHOSPHATASE LEVEL: Primary | ICD-10-CM

## 2018-06-29 PROBLEM — I21.9 AMI (ACUTE MYOCARDIAL INFARCTION) (HCC): Status: ACTIVE | Noted: 2017-01-01

## 2018-06-29 RX ORDER — CYCLOBENZAPRINE HCL 5 MG
5 TABLET ORAL
COMMUNITY
End: 2018-09-26 | Stop reason: ALTCHOICE

## 2018-06-29 NOTE — PROGRESS NOTES
Chief Complaint   Patient presents with   [facility] Patient     Visit Vitals    /84 (BP 1 Location: Left arm, BP Patient Position: Sitting)    Pulse 94    Temp 96.2 °F (35.7 °C) (Tympanic)    Wt 181 lb 3.2 oz (82.2 kg)    SpO2 90%    BMI 26.76 kg/m2     PHQ over the last two weeks 2/20/2018   Little interest or pleasure in doing things Not at all   Feeling down, depressed or hopeless Not at all   Total Score PHQ 2 0     Learning Assessment 6/29/2018   PRIMARY LEARNER Patient   HIGHEST LEVEL OF EDUCATION - PRIMARY LEARNER  -   BARRIERS PRIMARY LEARNER NONE   CO-LEARNER CAREGIVER No   PRIMARY LANGUAGE ENGLISH   LEARNER PREFERENCE PRIMARY LISTENING   LEARNING SPECIAL TOPICS -   ANSWERED BY patient    RELATIONSHIP SELF     Abuse Screening Questionnaire 6/29/2018   Do you ever feel afraid of your partner? N   Are you in a relationship with someone who physically or mentally threatens you? N   Is it safe for you to go home?  Travis Estrada

## 2018-06-29 NOTE — PROGRESS NOTES
70 Amrik Brooks MD, [address], [facility], [place]       April KAREY Coppola PA-C Casimiro Schuller, JEANETTE-BC   KAREY Singh NP [address] [facility] 136    at [address], [address] [place] [address], [facility] 22.    [phone]    FAX: [address] [place] [address]    at [address], [id number] [place] [address], 7823 [facility],[address]    [address] [facility] [address]: [phone]     Patient Care Team:  Kevin Soto MD as PCP - General (Family Practice)  Maria Dolores Forde MD (Cardiology)  López Mejía MD (General Surgery)  Hilton Javier NP (Nurse Practitioner)  Arcelia Hernandez MD (Cardiology)  Barbi Dowd MD (Orthopedic Surgery)  Carol Crawford MD (Pulmonary Disease)    Problem List  Date Reviewed: 6/14/2018          Codes Class Noted    Elevated alkaline phosphatase level ICD-10-CM: R74.8  ICD-9-CM: 790.5  6/29/2018        CAP (community acquired pneumonia) ICD-10-CM: J18.9  ICD-9-CM: 486  3/30/2018        Chronic cholecystitis without calculus ICD-10-CM: K81.1  ICD-9-CM: 575.11  4/7/9457        Systolic CHF ([facility] ETU-41-FD: I50.20  ICD-9-CM: 428.20, 428.0  12/30/2015        Acute cholecystitis ICD-10-CM: K81.0  ICD-9-CM: 575.0  12/28/2015        Hypertension ICD-10-CM: I10  ICD-9-CM: 401.9  11/13/2014        Sarcoid ICD-10-CM: D86.9  ICD-9-CM: 135  Unknown        DDD (degenerative disc disease), lumbar ICD-10-CM: M51.36  ICD-9-CM: 722.52  Unknown        Asthma ICD-10-CM: J45.909  ICD-9-CM: 493.90  1/8/2013        Sarcoidosis ICD-10-CM: D86.9  ICD-9-CM: 135  1/8/2013        Bipolar 2 disorder ([facility] ICD-10-CM: F31.81  ICD-9-CM: 296.89  1/8/2013            The clinicians listed above have asked me to see Zackary Turner in consultation regarding elevated liver enzymes and its management. All medical records sent by the referring physicians were reviewed. The patient is a 48 y.o. Black male who was first noted to have abnormalities in alkaline phosphatase going back to 2011. Serologic evaluation for markers of chronic liver disease were either not performed or available to me. Imaging of the liver was either not performed or the results are not available to me. An assessment of liver fibrosis with biopsy or elastography has not been performed. The patient notes pain in his right back. He was originally worked up for a kidney issue but work up was negative. He states the pain is intermittent, feels like someone stabbing him with a knife in the back and it lasts for seconds. Nothing makes it better, worse or precipitates the pain. The patient has not experienced yellowing of the eyes or skin or problems concentrating. The patient has moderate limitations in functional activities which can be attributed to other medical problems that are not related to the liver disease. ASSESSMENT AND PLAN:  Persistent elevation in alkaline phosphatase of unclear etiology at this time. The most recent laboratory studies indicate the liver transaminases are normal, ALP is elevated, tests of hepatic synthetic and metabolic function are normal, total bilirubin is normal, albumin is normal and the platelet count is   normal.    Based upon laboratory studies, the patient does not appear to have advanced liver disease or cirrhosis. Serologic testing for causes of chronic liver disease were ordered. The most likely causes for the liver chemistry abnormalities were discussed with the patient and include immune liver disorders. Will perform laboratory testing to monitor liver function and degree of liver injury. This included BMP, hepatic panel, CBC with platelet count and INR. Will perform imaging of the liver with ultrasound.       The need to perform an assessment of liver fibrosis was discussed with the patient. The FibroScan can assess liver fibrosis and determine if a patient has advanced fibrosis or cirrhosis without the need for liver biopsy. The FibroScan is currently available at liver [place]. This will be performed at the next office visit. If the FibroScan suggests advanced fibrosis then a liver biopsy should be considered. Treatment of other medical problems in patients with chronic liver disease  There are no contraindications for the patient to take any medications that are necessary for treatment of other medical issues. The patient can take oral diabetic agents for treatment of diabetes and statins for treatment of hypercholesterolemia. This will not impact the patient's current liver disease. The patient does not consume alcohol daily. Normal doses of acetaminophen can be used for pain as needed. Normal doses of acetaminophen as recommended on the label are not hepatotoxic, even in patients with cirrhosis. Counseling for alcohol in patients with chronic liver disease  The patient has not consumed alcohol since 2017. Vaccinations   The need for vaccination against viral hepatitis A and B will be assessed with serologic and instituted as appropriate. Routine vaccinations against other bacterial and viral agents can be performed as indicated. Annual flu vaccination should be administered if indicated. Screening for hepatocellular carcinoma  HCC screening will be initiated if the patient is found to have cirrhosis. ALLERGIES  Allergies   Allergen Reactions    Pcn [Penicillins] Anaphylaxis    Shellfish Derived Anaphylaxis    Iodine Hives and Swelling     MEDICATIONS  Current Outpatient Prescriptions   Medication Sig    cyclobenzaprine (FLEXERIL) 5 mg tablet Take 5 mg by mouth.  predniSONE (DELTASONE) 5 mg tablet Take 1 Tab by mouth daily.  30 mg daily  Indications: sarcoidosis    carvedilol (COREG) 25 mg tablet Take 1 Tab by mouth two (2) times daily (with meals). Indications: chronic heart failure, hypertension    fluticasone-vilanterol (BREO ELLIPTA) 200-25 mcg/dose inhaler Take 1 Puff by inhalation daily.  losartan (COZAAR) 100 mg tablet Take 1 Tab by mouth daily.  loratadine (CLARITIN) 10 mg tablet Take 1 Tab by mouth daily as needed for Allergies.  fluticasone (FLONASE) 50 mcg/actuation nasal spray Use as directed    aspirin delayed-release 81 mg tablet Take 81 mg by mouth daily.  lithium carbonate CR (ESKALITH CR) 450 mg CR tablet Take 450 mg by mouth two (2) times a day.  citalopram (CELEXA) 40 mg tablet Take 40 mg by mouth daily.  ibuprofen (MOTRIN) 800 mg tablet Take 1 Tab by mouth two (2) times daily as needed for Pain. Take with full glass of water    Omeprazole delayed release (PRILOSEC D/R) 20 mg tablet Take 2 Tabs by mouth daily. Take 10-20 min before breakfast    calcium-vitamin D (CALCIUM 500+D) 500 mg(1,250mg) -200 unit per tablet Take 1 Tab by mouth two (2) times daily (with meals).  furosemide (LASIX) 20 mg tablet Take 1 Tab by mouth two (2) times a day.  lidocaine (LIDODERM) 5 % 1 Patch by TransDERmal route daily as needed for Pain. Apply patch to the affected area for 12 hours a day and remove for 12 hours a day.  nitroglycerin (NITROSTAT) 0.4 mg SL tablet 1 Tab by SubLINGual route every five (5) minutes as needed for Chest Pain (Max dose of 3). No current facility-administered medications for this visit. SYSTEM REVIEW NOT RELATED TO LIVER DISEASE OR REVIEWED ABOVE:  Constitution systems: Negative for fever, chills, weight gain, weight loss. Eyes: Negative for visual changes. ENT: Negative for sore throat, painful swallowing. Respiratory: Negative for cough, hemoptysis, SOB. Cardiology: Negative for chest pain, palpitations. GI:  Negative for constipation or diarrhea. : Negative for urinary frequency, dysuria, hematuria, nocturia.    Skin: Negative for rash. Hematology: Negative for easy bruising, blood clots. Musculo-skeletal: Negative for back pain, muscle pain, weakness. Neurologic: Negative for headaches, dizziness, vertigo, memory problems not related to HE. Psychology: Negative for anxiety, depression. FAMILY HISTORY:  The father is alive and healthy. The mother has hypertension. She is 92 or 93. There is no family history of liver disease. SOCIAL HISTORY:  The patient has never been . The patient has 2 children and 5 grandchildren. The patient stopped using tobacco products in 11/2017. The patient didn't drink daily but stopped drinking anything in 2017. The patient is currently receiving disability. PHYSICAL EXAMINATION:  Visit Vitals    /84 (BP 1 Location: Left arm, BP Patient Position: Sitting)    Pulse 94    Temp 96.2 °F (35.7 °C) (Tympanic)    Wt 181 lb 3.2 oz (82.2 kg)    SpO2 90%    BMI 26.76 kg/m2     General: No acute distress. Eyes: Sclera anicteric. ENT: No oral lesions. Nodes: No adenopathy. Skin: No spider angiomata. No jaundice. No palmar erythema. Respiratory: Lungs clear to auscultation. He has supplemental oxygen with him. Cardiovascular: Regular heart rate. No murmurs. No JVD. Abdomen: Soft non-tender. Liver size normal to percussion/palpation. Spleen not palpable. No obvious ascites. Extremities: No edema. No muscle wasting. No gross arthritic changes. Neurologic: Alert and oriented. Cranial nerves grossly intact. No asterixis.     LABORATORY STUDIES:  Liver [place] of [address] & Units 4/4/2018 4/4/2018 3/31/2018   WBC 4.1 - 11.1 K/uL   10.6   ANC 1.8 - 8.0 K/UL      HGB 12.1 - 17.0 g/dL   15.0   HGB 12.1 - 17.0 GM/DL      HGB (iSTAT) 12.1 - 17.0 GM/DL       - 400 K/uL   161   INR 0.9 - 1.1        AST 0 - 40 IU/L  37    ALT 0 - 44 IU/L  42    Alk Phos 39 - 117 IU/L 417 (H) 423 (H)    Bili, Total 0.0 - 1.2 mg/dL  0.6    Bili, Direct 0.00 - 0.40 mg/dL      Albumin 3.5 - 5.5 g/dL  4.1    BUN 6 - 24 mg/dL  11 10   BUN (iSTAT) 9 - 20 MG/DL      Creat 0.76 - 1.27 mg/dL  1.28 (H) 1.09   Creat (iSTAT) 0.6 - 1.3 MG/DL      Na 134 - 144 mmol/L  142 137   K 3.5 - 5.2 mmol/L  4.3 4.1   Cl 96 - 106 mmol/L  101 105   CO2 18 - 29 mmol/L  24 23   Glucose 65 - 99 mg/dL  91 102 (H)   Magnesium 1.6 - 2.4 mg/dL        SEROLOGIES:  Not available or performed. Testing was performed today.     LIVER HISTOLOGY:  Not available or performed    ENDOSCOPIC PROCEDURES:  Not available or performed    RADIOLOGY:  Not available or performed    OTHER TESTING:  Not available or performed    JEANETTE Barker-BC  Liver [place] [address] [place], [address] Nithin Leah[address]  22.  [phone]

## 2018-06-29 NOTE — MR AVS SNAPSHOT
2700 [facility] [phone] [address] [place] [address] 
[phone] Patient: Elizabeth Michel MRN: [id number] :1965 Visit Information Date & Time Provider Department Dept. Phone Encounter #  
 2018 11:05 AM Oliver Ambrosio Michael, [address] [facility] [id number] Follow-up Instructions Return in about 4 weeks (around 2018) for FibroScan Oliver Rivera. Your Appointments 2018  9:00 AM  
Any with Landy Lara MD  
[address] [place] [address] and Primary Care [facility]) Appt Note: 3 MONTH FOLLOW UP  
 TyraYamileth Shoaib 90 1 [facility]  
  
   
 Adan Shoaib [id number] Upcoming Health Maintenance Date Due FOBT Q 1 YEAR AGE 50-75 10/14/2018* Pneumococcal 19-64 Highest Risk (1 of 3 - PCV13) 2019* Influenza Age 5 to Adult 2018 DTaP/Tdap/Td series (2 - Td) 10/25/2027 *Topic was postponed. The date shown is not the original due date. Allergies as of 2018  Review Complete On: 2018 By: Abdiaziz Willis Severity Noted Reaction Type Reactions Pcn [Penicillins] High 2016    Anaphylaxis Shellfish Derived High 2016    Anaphylaxis Iodine  2010    Hives, Swelling Current Immunizations  Reviewed on 1/3/2016 Name Date Influenza Vaccine Whole 2009 TD Vaccine 2009 Not reviewed this visit You Were Diagnosed With   
  
 Codes Comments Elevated alkaline phosphatase level    -  Primary ICD-10-CM: R74.8 ICD-9-CM: 790.5 Vitals BP Pulse Temp Weight(growth percentile) SpO2 BMI  
 122/84 (BP 1 Location: Left arm, BP Patient Position: Sitting) 94 96.2 °F (35.7 °C) (Tympanic) 181 lb 3.2 oz (82.2 kg) 90% 26.76 kg/m2 Smoking Status Current Every Day Smoker BMI and BSA Data  Body Mass Index Body Surface Area  
 26.76 kg/m 2 2 m 2  
  
  
 Preferred Pharmacy Pharmacy Name Phone CONSUELO [email or web address] - [place] JARAMILL[address] [phone] Your Updated Medication List  
  
   
This list is accurate as of 6/29/18 11:18 AM.  Always use your most recent med list.  
  
  
  
  
 aspirin delayed-release 81 mg tablet Take 81 mg by mouth daily. calcium-vitamin D 500 mg(1,250mg) -200 unit per tablet Commonly known as:  CALCIUM 500+D Take 1 Tab by mouth two (2) times daily (with meals). carvedilol 25 mg tablet Commonly known as:  [employer] Take 1 Tab by mouth two (2) times daily (with meals). Indications: chronic heart failure, hypertension  
  
 citalopram 40 mg tablet Commonly known as:  Lyell Decatur Take 40 mg by mouth daily. cyclobenzaprine 5 mg tablet Commonly known as:  FLEXERIL Take 5 mg by mouth. fluticasone 50 mcg/actuation nasal spray Commonly known as:  Damien Thomas Use as directed  
  
 fluticasone-vilanterol 200-25 mcg/dose inhaler Commonly known as:  BREO ELLIPTA Take 1 Puff by inhalation daily. furosemide 20 mg tablet Commonly known as:  LASIX Take 1 Tab by mouth two (2) times a day. ibuprofen 800 mg tablet Commonly known as:  MOTRIN Take 1 Tab by mouth two (2) times daily as needed for Pain. Take with full glass of water  
  
 lidocaine 5 % Commonly known as:  LIDODERM  
1 Patch by TransDERmal route daily as needed for Pain. Apply patch to the affected area for 12 hours a day and remove for 12 hours a day. lithium carbonate  mg CR tablet Commonly known as:  ESKALITH CR Take 450 mg by mouth two (2) times a day. loratadine 10 mg tablet Commonly known as:  [employer] Take 1 Tab by mouth daily as needed for Allergies. losartan 100 mg tablet Commonly known as:  COZAAR Take 1 Tab by mouth daily. nitroglycerin 0.4 mg SL tablet Commonly known as:  NITROSTAT 1 Tab by SubLINGual route every five (5) minutes as needed for Chest Pain (Max dose of 3). Omeprazole delayed release 20 mg tablet Commonly known as:  PRILOSEC D/R Take 2 Tabs by mouth daily. Take 10-20 min before breakfast  
  
 predniSONE 5 mg tablet Commonly known as:  Belen Gong Take 1 Tab by mouth daily. 30 mg daily  Indications: sarcoidosis We Performed the Following ALPHA-1-ANTITRYPSIN, TOTAL, PHENOTYPE [13843 CPT(R)] ANCA PANEL X5848181 CPT(R)] ANGIOTENSIN CONVERTING ENZYME [id number] CPT(R)] MITOCHONDRIAL M2 AB [id number] CPT(R)] Follow-up Instructions Return in about 4 weeks (around 7/27/2018) for FibroScan Marlena Marquis. To-Do List   
 07/09/2018 Imaging:  US ABD COMP   
  
 08/17/2018 9:30 AM  
  Appointment with Xochitl Kelly. Padilla Carrasco NP at [place] [address] ([phone]) Patient Instructions FIBROSCAN PATIENT INFORMATION What is Fibroscan:? 
 
Fibroscan is an ultrasound device that measures liver stiffness by sending a pulse of vibrations through the liver. This translated into an immediate result that can help your healthcare team determine the level of damage to the liver as well as monitor the condition of various liver diseases over time. Fibroscan is helpful in the evaluation of the following conditions: 
 
Chronic Hepatitis C Chronic Hepatitis B Fatty Liver Disease Alcohol Liver Disease Chronic Cholestatic Liver Diseases What happens During the Scan? Patients receiving this exam lie flat on an examination table and raise the right arm above the head. The skin over the right lower rib cage is exposed and the examiner locates the correct area to be scanned. The prove of the scanner is placed directly on the patient and triggered to start. This fells like a gentle flick against the skin and should not be uncomfortable.   At least ten (10) readings are taken and the average is calculated to score the amount of liver stiffness or scar tissue. The exam should take 10-20 minutes. What do I need to do to prepare for the scan? Please do not eat or drink anything 2-4 hours  Before your Fibroscan. You should continue taking any prescribed medication and can take small sips of water or clear fluid to do so,  But avoid drinking large amounts of fluid. Please dress comfortably in clothes that will allow for easy access to the right side of the abdomen. Women are discouraged from wearing a dress on the day of the exam. 
 
Are there any special precautions? Patients who are pregnant or have an implantable device (for example, pacemaker or defibrillator) should not have this exam performed. Patients with a significant amount of fat tissue in the area the probe is pressed may be unable to have test performed. Introducing [facility]! Shoshana Valdez introduces [employer] patient portal. Now you can access parts of your medical record, email your doctor's office, and request medication refills online. 1. In your internet browser, go to [email or web address] 2. Click on the First Time User? Click Here link in the Sign In box. You will see the New Member Sign Up page. 3. Enter your [employer] Access Code exactly as it appears below. You will not need to use this code after youve completed the sign-up process. If you do not sign up before the expiration date, you must request a new code. · [employer] Access Code: [email or web address] Expires: 9/12/2018 10:45 AM 
 
4. Enter the last four digits of your Social Security Number (xxxx) and Date of Birth (mm/dd/yyyy) as indicated and click Submit. You will be taken to the next sign-up page. 5. Create a [employer] ID. This will be your [employer] login ID and cannot be changed, so think of one that is secure and easy to remember. 6. Create a [employer] password. You can change your password at any time. 7. Enter your Password Reset Question and Answer. This can be used at a later time if you forget your password. 8. Enter your e-mail address. You will receive e-mail notification when new information is available in [address] 9. Click Sign Up. You can now view and download portions of your medical record. 10. Click the Download Summary menu link to download a portable copy of your medical information. If you have questions, please visit the Frequently Asked Questions section of the [employer] website. Remember, [employer] is NOT to be used for urgent needs. For medical emergencies, dial 911. Now available from your iPhone and Android! Please provide this summary of care documentation to your next provider. Your primary care clinician is listed as [facility]. If you have any questions after today's visit, please call [phone].

## 2018-06-30 LAB — ACE SERPL-CCNC: 27 U/L (ref 14–82)

## 2018-07-01 LAB — MITOCHONDRIA M2 IGG SER-ACNC: 8.7 UNITS (ref 0–20)

## 2018-07-03 LAB
A1AT PHENOTYP SERPL IFE: NORMAL
A1AT SERPL-MCNC: 127 MG/DL (ref 90–200)
C-ANCA TITR SER IF: NORMAL TITER
MYELOPEROXIDASE AB SER IA-ACNC: <9 U/ML (ref 0–9)
P-ANCA ATYPICAL TITR SER IF: NORMAL TITER
P-ANCA TITR SER IF: NORMAL TITER
PROTEINASE3 AB SER IA-ACNC: <3.5 U/ML (ref 0–3.5)

## 2018-08-10 ENCOUNTER — HOSPITAL ENCOUNTER (OUTPATIENT)
Dept: ULTRASOUND IMAGING | Age: 53
Discharge: HOME | End: 2018-08-10
Attending: NURSE PRACTITIONER
Payer: MEDICAID

## 2018-08-10 DIAGNOSIS — R74.8 ELEVATED ALKALINE PHOSPHATASE LEVEL: ICD-10-CM

## 2018-08-10 PROCEDURE — 76700 US EXAM ABDOM COMPLETE: CPT

## 2018-08-17 ENCOUNTER — OFFICE VISIT (OUTPATIENT)
Dept: HEMATOLOGY | Age: 53
End: 2018-08-17

## 2018-08-17 VITALS
HEART RATE: 89 BPM | OXYGEN SATURATION: 89 % | WEIGHT: 180 LBS | BODY MASS INDEX: 26.58 KG/M2 | SYSTOLIC BLOOD PRESSURE: 161 MMHG | DIASTOLIC BLOOD PRESSURE: 116 MMHG | TEMPERATURE: 98 F

## 2018-08-17 DIAGNOSIS — R74.8 ELEVATED ALKALINE PHOSPHATASE LEVEL: Primary | ICD-10-CM

## 2018-08-17 LAB
ERYTHROCYTE [DISTWIDTH] IN BLOOD BY AUTOMATED COUNT: 15.7 % (ref 12.3–15.4)
HCT VFR BLD AUTO: 52.6 % (ref 37.5–51)
HGB BLD-MCNC: 18.2 G/DL (ref 13–17.7)
MCH RBC QN AUTO: 29.8 PG (ref 26.6–33)
MCHC RBC AUTO-ENTMCNC: 34.6 G/DL (ref 31.5–35.7)
MCV RBC AUTO: 86 FL (ref 79–97)
PLATELET # BLD AUTO: 176 X10E3/UL (ref 150–379)
RBC # BLD AUTO: 6.1 X10E6/UL (ref 4.14–5.8)
WBC # BLD AUTO: 8.1 X10E3/UL (ref 3.4–10.8)

## 2018-08-17 NOTE — MR AVS SNAPSHOT
110 [facility] [phone] 1400 [address] [place] [address] 
[phone] Patient: Donald Lamar MRN: [id number] :1965 Visit Information Date & Time Provider Department Dept. Phone Encounter #  
 2018  9:30 AM Toña Langford, [address] [place] [address] [facility] [id number] Your Appointments 2018  9:00 AM  
Any with Zackery Mcclelland MD  
[address] [place] [address] and [facility]) Appt Note: 3 MONTH FOLLOW UP  
 [address] 1 [facility]  
  
   
 [address] [id number] Upcoming Health Maintenance Date Due Influenza Age 5 to Adult 2018 FOBT Q 1 YEAR AGE 50-75 10/14/2018* Pneumococcal 19-64 Highest Risk (1 of 3 - PCV13) 2019* DTaP/Tdap/Td series (2 - Td) 10/25/2027 *Topic was postponed. The date shown is not the original due date. Allergies as of 2018  Review Complete On: 2018 By: Shivam Ramon Severity Noted Reaction Type Reactions Pcn [Penicillins] High 2016    Anaphylaxis Shellfish Derived High 2016    Anaphylaxis Iodine  2010    Hives, Swelling Current Immunizations  Reviewed on 1/3/2016 Name Date Influenza Vaccine Whole 2009 TD Vaccine 2009 Not reviewed this visit You Were Diagnosed With   
  
 Codes Comments Elevated alkaline phosphatase level    -  Primary ICD-10-CM: R74.8 ICD-9-CM: 790.5 Vitals BP Pulse Temp Weight(growth percentile) SpO2 BMI  
 (!) 161/116 (BP 1 Location: Left arm, BP Patient Position: Sitting) 89 98 °F (36.7 °C) (Tympanic) 180 lb (81.6 kg) (!) 89% 26.58 kg/m2 Smoking Status Current Every Day Smoker Vitals History BMI and BSA Data Body Mass Index Body Surface Area  
 26.58 kg/m 2 1.99 m 2 Preferred Pharmacy Pharmacy Name Phone [email or web address] [facility] [place], [address] [phone] Your Updated Medication List  
  
   
This list is accurate as of 8/17/18  9:49 AM.  Always use your most recent med list.  
  
  
  
  
 aspirin delayed-release 81 mg tablet Take 81 mg by mouth daily. calcium-vitamin D 500 mg(1,250mg) -200 unit per tablet Commonly known as:  CALCIUM 500+D Take 1 Tab by mouth two (2) times daily (with meals). carvedilol 25 mg tablet Commonly known as:  Rosales Servant Take 1 Tab by mouth two (2) times daily (with meals). Indications: chronic heart failure, hypertension  
  
 citalopram 40 mg tablet Commonly known as:  Tanya Mutters Take 40 mg by mouth daily. cyclobenzaprine 5 mg tablet Commonly known as:  FLEXERIL Take 5 mg by mouth. fluticasone 50 mcg/actuation nasal spray Commonly known as:  Hosea Riser Use as directed  
  
 fluticasone-vilanterol 200-25 mcg/dose inhaler Commonly known as:  BREO ELLIPTA Take 1 Puff by inhalation daily. furosemide 20 mg tablet Commonly known as:  LASIX Take 1 Tab by mouth two (2) times a day. ibuprofen 800 mg tablet Commonly known as:  MOTRIN Take 1 Tab by mouth two (2) times daily as needed for Pain. Take with full glass of water  
  
 lidocaine 5 % Commonly known as:  LIDODERM  
1 Patch by TransDERmal route daily as needed for Pain. Apply patch to the affected area for 12 hours a day and remove for 12 hours a day. lithium carbonate  mg CR tablet Commonly known as:  ESKALITH CR Take 450 mg by mouth two (2) times a day. loratadine 10 mg tablet Commonly known as:  Sequoyah Hippo Take 1 Tab by mouth daily as needed for Allergies. losartan 100 mg tablet Commonly known as:  COZAAR Take 1 Tab by mouth daily. nitroglycerin 0.4 mg SL tablet Commonly known as:  NITROSTAT  
1 Tab by SubLINGual route every five (5) minutes as needed for Chest Pain (Max dose of 3). Omeprazole delayed release 20 mg tablet Commonly known as:  PRILOSEC D/R Take 2 Tabs by mouth daily. Take 10-20 min before breakfast  
  
 predniSONE 5 mg tablet Commonly known as:  Melida Ortiz Take 1 Tab by mouth daily. 30 mg daily  Indications: sarcoidosis We Performed the Following CBC W/O DIFF [84435 CPT(R)] HEPATIC FUNCTION PANEL [89281 CPT(R)] LIVER ELASTOGRAPHY W/O IMAG W/I&R [58755 CPT(R)] METABOLIC PANEL, BASIC [20939 CPT(R)] Introducing [facility] & HEALTH SERVICES! [employer] introduces [employer] patient portal. Now you can access parts of your medical record, email your doctor's office, and request medication refills online. 1. In your internet browser, go to [email or web address] 2. Click on the First Time User? Click Here link in the Sign In box. You will see the New Member Sign Up page. 3. Enter your As[employer] Access Code exactly as it appears below. You will not need to use this code after youve completed the sign-up process. If you do not sign up before the expiration date, you must request a new code. · Asuum Access Code: [email or web address] Expires: 9/12/2018 10:45 AM 
 
4. Enter the last four digits of your Social Security Number (xxxx) and Date of Birth (mm/dd/yyyy) as indicated and click Submit. You will be taken to the next sign-up page. 5. Create a Asuum ID. This will be your Asuum login ID and cannot be changed, so think of one that is secure and easy to remember. 6. Create a Asuum password. You can change your password at any time. 7. Enter your Password Reset Question and Answer. This can be used at a later time if you forget your password. 8. Enter your e-mail address. You will receive e-mail notification when new information is available in [address] 9. Click Sign Up. You can now view and download portions of your medical record.  
10. Click the Download Summary menu link to download a portable copy of your medical information. If you have questions, please visit the Frequently Asked Questions section of the IPXI website. Remember, [employer] is NOT to be used for urgent needs. For medical emergencies, dial 911. Now available from your iPhone and Android! Please provide this summary of care documentation to your next provider. Your primary care clinician is listed as Grady Lopez. If you have any questions after today's visit, please call [phone].

## 2018-08-17 NOTE — PROGRESS NOTES
1. Have you been to the ER, urgent care clinic since your last visit? Hospitalized since your last visit? No    2. Have you seen or consulted any other health care providers outside of the [address] [place] [address] since your last visit? Include any pap smears or colon screening. No   Chief Complaint   Patient presents with    Follow-up     Fibroscan      Visit Vitals    BP (!) 161/116 (BP 1 Location: Left arm, BP Patient Position: Sitting)    Pulse 89    Temp 98 °F (36.7 °C) (Tympanic)    Wt 180 lb (81.6 kg)    SpO2 (!) 89%    BMI 26.58 kg/m2     PHQ over the last two weeks 2/20/2018   Little interest or pleasure in doing things Not at all   Feeling down, depressed, irritable, or hopeless Not at all   Total Score PHQ 2 0     Learning Assessment 8/17/2018   PRIMARY LEARNER Patient   HIGHEST LEVEL OF EDUCATION - PRIMARY LEARNER  -   BARRIERS PRIMARY LEARNER NONE   CO-LEARNER CAREGIVER No   PRIMARY LANGUAGE ENGLISH   LEARNER PREFERENCE PRIMARY LISTENING   LEARNING SPECIAL TOPICS -   ANSWERED BY patient    RELATIONSHIP SELF     Abuse Screening Questionnaire 8/17/2018   Do you ever feel afraid of your partner? N   Are you in a relationship with someone who physically or mentally threatens you? N   Is it safe for you to go home?  Nevaeh Jain

## 2018-08-17 NOTE — PROGRESS NOTES
I forgot to update the FibroScan results under histology:    8/2018. [facility]. EkPa was 4.6. IQR/med 22%. The results suggested a fibrosis level of F0. CAP was 182, not consistent with fatty liver disease. Jaclyn Bernabe NP  9:54 AM

## 2018-08-17 NOTE — PROGRESS NOTES
70 Amrik Brooks MD, [address], [facility], [place]       KAREY Hewitt PA-C Jannette Pod, [facility]   KAREY Cortez NP [address]    at [address], [address] Nithin Leahy  22.    [phone]    FAX: [address]    at [address], [address] [facility], [address] [facility],[address]    [facility] Street: [phone]     Patient Care Team:  [address] [place] [address] MD Leon as PCP - General (Family Practice)  Justin Jimenez MD (Cardiology)  Kendall Romero MD (General Surgery)  Charlene Henderson NP (Nurse Practitioner)  Janette Ponce MD (Cardiology)  Alexandru Goncalves MD (Orthopedic Surgery)  Corey Gaines MD (Pulmonary Disease)    Problem List  Date Reviewed: 6/29/2018          Codes Class Noted    Elevated alkaline phosphatase level ICD-10-CM: R74.8  ICD-9-CM: 790.5  6/29/2018        CAP (community acquired pneumonia) ICD-10-CM: J18.9  ICD-9-CM: 486  3/30/2018        AMI (acute myocardial infarction) [facility]) ICD-10-CM: I21.9  ICD-9-CM: 410.90  1/1/2017    Overview Addendum 6/29/2018 12:25 PM by Valerie Aiken. Elaine Lin NP     Occurred in 2017. Old radiology/procedures/imaging is not available for some reason today.              Chronic cholecystitis without calculus ICD-10-CM: K81.1  ICD-9-CM: 575.11  5/8/5192        Systolic CHF ([address] VWM-77-GA: I50.20  ICD-9-CM: 428.20, 428.0  12/30/2015        Acute cholecystitis ICD-10-CM: K81.0  ICD-9-CM: 575.0  12/28/2015        Hypertension ICD-10-CM: I10  ICD-9-CM: 401.9  11/13/2014        Sarcoid ICD-10-CM: D86.9  ICD-9-CM: 557  Unknown        DDD (degenerative disc disease), lumbar ICD-10-CM: M51.36  ICD-9-CM: 722.52  Unknown        Asthma ICD-10-CM: J45.909  ICD-9-CM: 493.90  1/8/2013 Sarcoidosis ICD-10-CM: D86.9  ICD-9-CM: 283  1/8/2013        Bipolar 2 disorder ([facility] ICD-10-CM: F31.81  ICD-9-CM: 296.89  1/8/2013            Jose Alejandro Moreira returns to the [address] for management of elevated alkaline phosphatase. The active problem list, all pertinent past medical history, medications, liver histology, radiologic findings and laboratory findings related to the liver disorder were reviewed with the patient. The patient is a 48 y.o. Black male who was first noted to have abnormalities in alkaline phosphatase going back to 2011. Serologic evaluation for markers of chronic liver disease were either not performed or available to me. Imaging of the liver was normal.      An assessment of liver fibrosis with elastography will be performed this visit. The patient notes pain in his LLQ. He was originally worked up for a kidney issue but work up was negative. He states the pain is intermittent, feels like someone stabbing him with a knife in the back and it lasts for seconds. Nothing makes it better, worse or precipitates the pain. All work up so far has been negative including CT scan and colonoscopy. The patient has not experienced yellowing of the eyes or skin or problems concentrating. The patient has moderate limitations in functional activities which can be attributed to other medical problems that are not related to the liver disease. ASSESSMENT AND PLAN:  Persistent elevation in alkaline phosphatase with no fibrosis. Since all serology was negative and he has no evidence of fibrosis, we will just monitor and no further work up is needed. We will repeat the FibroScan every year to monitor for any liver injury.      The most recent laboratory studies indicate the liver transaminases are normal, ALP is elevated, tests of hepatic synthetic and metabolic function are normal, total bilirubin is normal, albumin is normal and the platelet count is normal.    Based upon laboratory studies, imaging and elastography, the patient does not appear to have advanced liver disease or cirrhosis. Serologic testing for causes of chronic liver disease were all negative. Will perform laboratory testing to monitor liver function and degree of liver injury. This included BMP, hepatic panel, CBC with platelet count and INR. Treatment of other medical problems in patients with chronic liver disease  There are no contraindications for the patient to take any medications that are necessary for treatment of other medical issues. The patient can take oral diabetic agents for treatment of diabetes and statins for treatment of hypercholesterolemia. This will not impact the patient's current liver disease. The patient does not consume alcohol daily. Normal doses of acetaminophen can be used for pain as needed. Normal doses of acetaminophen as recommended on the label are not hepatotoxic, even in patients with cirrhosis. Counseling for alcohol in patients with chronic liver disease  The patient has not consumed alcohol since 2017. Vaccinations   The need for vaccination against viral hepatitis A and B will be assessed with serologic and instituted as appropriate. Routine vaccinations against other bacterial and viral agents can be performed as indicated. Annual flu vaccination should be administered if indicated. Screening for hepatocellular carcinoma  HCC screening will be initiated if the patient is found to have cirrhosis. ALLERGIES  Allergies   Allergen Reactions    Pcn [Penicillins] Anaphylaxis    Shellfish Derived Anaphylaxis    Iodine Hives and Swelling     MEDICATIONS  Current Outpatient Prescriptions   Medication Sig    cyclobenzaprine (FLEXERIL) 5 mg tablet Take 5 mg by mouth.  predniSONE (DELTASONE) 5 mg tablet Take 1 Tab by mouth daily.  30 mg daily  Indications: sarcoidosis    carvedilol (COREG) 25 mg tablet Take 1 Tab by mouth two (2) times daily (with meals). Indications: chronic heart failure, hypertension    fluticasone-vilanterol (BREO ELLIPTA) 200-25 mcg/dose inhaler Take 1 Puff by inhalation daily.  losartan (COZAAR) 100 mg tablet Take 1 Tab by mouth daily.  loratadine (CLARITIN) 10 mg tablet Take 1 Tab by mouth daily as needed for Allergies.  fluticasone (FLONASE) 50 mcg/actuation nasal spray Use as directed    aspirin delayed-release 81 mg tablet Take 81 mg by mouth daily.  lithium carbonate CR (ESKALITH CR) 450 mg CR tablet Take 450 mg by mouth two (2) times a day.  citalopram (CELEXA) 40 mg tablet Take 40 mg by mouth daily.  ibuprofen (MOTRIN) 800 mg tablet Take 1 Tab by mouth two (2) times daily as needed for Pain. Take with full glass of water    Omeprazole delayed release (PRILOSEC D/R) 20 mg tablet Take 2 Tabs by mouth daily. Take 10-20 min before breakfast    calcium-vitamin D (CALCIUM 500+D) 500 mg(1,250mg) -200 unit per tablet Take 1 Tab by mouth two (2) times daily (with meals).  furosemide (LASIX) 20 mg tablet Take 1 Tab by mouth two (2) times a day.  lidocaine (LIDODERM) 5 % 1 Patch by TransDERmal route daily as needed for Pain. Apply patch to the affected area for 12 hours a day and remove for 12 hours a day.  nitroglycerin (NITROSTAT) 0.4 mg SL tablet 1 Tab by SubLINGual route every five (5) minutes as needed for Chest Pain (Max dose of 3). No current facility-administered medications for this visit. SYSTEM REVIEW NOT RELATED TO LIVER DISEASE OR REVIEWED ABOVE:  Constitution systems: Negative for fever, chills, weight gain, weight loss. Eyes: Negative for visual changes. ENT: Negative for sore throat, painful swallowing. Respiratory: Negative for cough, hemoptysis, SOB. Cardiology: Negative for chest pain, palpitations. GI:  Negative for constipation or diarrhea. : Negative for urinary frequency, dysuria, hematuria, nocturia. Skin: Negative for rash.   Hematology: Negative for easy bruising, blood clots. Musculo-skeletal: Negative for back pain, muscle pain, weakness. Neurologic: Negative for headaches, dizziness, vertigo, memory problems not related to HE. Psychology: Negative for anxiety, depression. FAMILY HISTORY:  The father is alive and healthy. The mother has hypertension. She is 92 or 93. There is no family history of liver disease. SOCIAL HISTORY:  The patient has never been . The patient has 2 children and 5 grandchildren. The patient stopped using tobacco products in 11/2017. The patient didn't drink daily but stopped drinking anything in 2017. The patient is currently receiving disability. PHYSICAL EXAMINATION:  Visit Vitals    BP (!) 161/116 (BP 1 Location: Left arm, BP Patient Position: Sitting)    Pulse 89    Temp 98 °F (36.7 °C) (Tympanic)    Wt 180 lb (81.6 kg)    SpO2 (!) 89%    BMI 26.58 kg/m2     General: No acute distress. Eyes: Sclera anicteric. ENT: No oral lesions. Nodes: No adenopathy. Skin: No spider angiomata. No jaundice. No palmar erythema. Respiratory: Lungs clear to auscultation. He has supplemental oxygen with him. Cardiovascular: Regular heart rate. No murmurs. No JVD. Abdomen: Soft non-tender. Liver size normal to percussion/palpation. Spleen not palpable. No obvious ascites. Extremities: No edema. No muscle wasting. No gross arthritic changes. Neurologic: Alert and oriented. Cranial nerves grossly intact. No asterixis.     LABORATORY STUDIES:  [facility] [place] of [address] [place] [address] Units 4/4/2018 4/4/2018 3/31/2018   WBC 4.1 - 11.1 K/uL   10.6   ANC 1.8 - 8.0 K/UL      HGB 12.1 - 17.0 g/dL   15.0   HGB 12.1 - 17.0 GM/DL      HGB (iSTAT) 12.1 - 17.0 GM/DL       - 400 K/uL   161   INR 0.9 - 1.1        AST 0 - 40 IU/L  37    ALT 0 - 44 IU/L  42    Alk Phos 39 - 117 IU/L 417 (H) 423 (H)    Bili, Total 0.0 - 1.2 mg/dL  0.6    Bili, Direct 0.00 - 0.40 mg/dL Albumin 3.5 - 5.5 g/dL  4.1    BUN 6 - 24 mg/dL  11 10   BUN (iSTAT) 9 - 20 MG/DL      Creat 0.76 - 1.27 mg/dL  1.28 (H) 1.09   Creat (iSTAT) 0.6 - 1.3 MG/DL      Na 134 - 144 mmol/L  142 137   K 3.5 - 5.2 mmol/L  4.3 4.1   Cl 96 - 106 mmol/L  101 105   CO2 18 - 29 mmol/L  24 23   Glucose 65 - 99 mg/dL  91 102 (H)   Magnesium 1.6 - 2.4 mg/dL        SEROLOGIES:  Not available or performed. Testing was performed today.     LIVER HISTOLOGY:  Not available or performed    ENDOSCOPIC PROCEDURES:  Not available or performed    RADIOLOGY:  Not available or performed    OTHER TESTING:  Not available or performed    JEANETTE Hill-BC  Liver [place] [facility] [address] [place], [address] Nithin Leahy  22.  [phone]

## 2018-08-18 LAB
ALBUMIN SERPL-MCNC: 4.3 G/DL (ref 3.5–5.5)
ALP SERPL-CCNC: 431 IU/L (ref 39–117)
ALT SERPL-CCNC: 54 IU/L (ref 0–44)
AST SERPL-CCNC: 32 IU/L (ref 0–40)
BILIRUB DIRECT SERPL-MCNC: 0.32 MG/DL (ref 0–0.4)
BILIRUB SERPL-MCNC: 1.3 MG/DL (ref 0–1.2)
BUN SERPL-MCNC: 8 MG/DL (ref 6–24)
BUN/CREAT SERPL: 7 (ref 9–20)
CALCIUM SERPL-MCNC: 9.4 MG/DL (ref 8.7–10.2)
CHLORIDE SERPL-SCNC: 100 MMOL/L (ref 96–106)
CO2 SERPL-SCNC: 20 MMOL/L (ref 20–29)
CREAT SERPL-MCNC: 1.21 MG/DL (ref 0.76–1.27)
GLUCOSE SERPL-MCNC: 92 MG/DL (ref 65–99)
POTASSIUM SERPL-SCNC: 4.5 MMOL/L (ref 3.5–5.2)
PROT SERPL-MCNC: 7.5 G/DL (ref 6–8.5)
SODIUM SERPL-SCNC: 139 MMOL/L (ref 134–144)

## 2018-08-30 NOTE — PROGRESS NOTES
Tbili elevated but indirect. ALP stable. ALT elevated but has been in the past. US normal. JA to call pt.

## 2018-09-06 DIAGNOSIS — I10 ESSENTIAL HYPERTENSION: ICD-10-CM

## 2018-09-06 DIAGNOSIS — I10 ESSENTIAL HYPERTENSION WITH GOAL BLOOD PRESSURE LESS THAN 140/90: ICD-10-CM

## 2018-09-06 RX ORDER — CARVEDILOL 25 MG/1
25 TABLET ORAL 2 TIMES DAILY WITH MEALS
Qty: 180 TAB | Refills: 1 | Status: SHIPPED | OUTPATIENT
Start: 2018-09-06 | End: 2018-11-13 | Stop reason: SDUPTHER

## 2018-09-19 DIAGNOSIS — I10 ESSENTIAL HYPERTENSION: ICD-10-CM

## 2018-09-19 RX ORDER — LOSARTAN POTASSIUM 100 MG/1
100 TABLET ORAL DAILY
Qty: 90 TAB | Refills: 4 | Status: SHIPPED | OUTPATIENT
Start: 2018-09-19 | End: 2019-04-25 | Stop reason: SDUPTHER

## 2018-09-26 ENCOUNTER — OFFICE VISIT (OUTPATIENT)
Dept: INTERNAL MEDICINE CLINIC | Age: 53
End: 2018-09-26

## 2018-09-26 VITALS
TEMPERATURE: 97.9 F | DIASTOLIC BLOOD PRESSURE: 95 MMHG | WEIGHT: 182 LBS | HEIGHT: 69 IN | BODY MASS INDEX: 26.96 KG/M2 | HEART RATE: 80 BPM | RESPIRATION RATE: 20 BRPM | OXYGEN SATURATION: 92 % | SYSTOLIC BLOOD PRESSURE: 133 MMHG

## 2018-09-26 DIAGNOSIS — I10 ESSENTIAL HYPERTENSION: Primary | ICD-10-CM

## 2018-09-26 DIAGNOSIS — D86.9 SARCOIDOSIS: ICD-10-CM

## 2018-09-26 RX ORDER — PREDNISONE 5 MG/1
5 TABLET ORAL DAILY
Qty: 30 TAB | Refills: 3 | Status: SHIPPED | OUTPATIENT
Start: 2018-09-26 | End: 2018-09-26 | Stop reason: SDUPTHER

## 2018-09-26 RX ORDER — PREDNISONE 5 MG/1
5 TABLET ORAL DAILY
Qty: 90 TAB | Refills: 2 | Status: SHIPPED | OUTPATIENT
Start: 2018-09-26 | End: 2019-04-25 | Stop reason: ALTCHOICE

## 2018-09-26 NOTE — PROGRESS NOTES
Chief Complaint   Patient presents with    Hypertension     Pt who presents for follow up of a pre-existing problem of hypertension. Diet and Lifestyle: not attempting to follow a low fat, low cholesterol diet, not attempting to follow a low sodium diet, sedentary, nonsmoker  Home BP Monitoring: is not measured at home  Use of agents associated with hypertension: none. Cardiovascular ROS: taking medications as instructed, no medication side effects noted, no TIA's, no chest pain on exertion, no swelling of ankles. New concerns: Patient states that when he was lifting and doing work he was short of breath. Patient states that his blood pressures been going up and down and was in need of clonidine over the last few months while he was out of state and onto tour. Pressures are monitored by cardiology    Reviewed and agree with Nurse Note and duplicated in this note. Reviewed PmHx, RxHx, FmHx, SocHx, AllgHx and updated and dated in the chart.     Family History   Problem Relation Age of Onset    Cancer Father     Diabetes Father     Heart Disease Father     Hypertension Father     Stroke Father     Diabetes Mother     Hypertension Mother        Past Medical History:   Diagnosis Date    Asthma     Bipolar disorder ([address]     Bi-polar, Anxiety    Cataracts, bilateral     DDD (degenerative disc disease)     Fracture of left humerus     non operative    Glaucoma     Heart failure ([address]     Degenerated heart failure    Hypertension     Ill-defined condition 2000    DX WITH SARCODOSIS    Ill-defined condition     USES O2 AT 3;30AM TO 5 AM EVERDAY & AS NEEDED    Ill-defined condition 2016    HEP C    Other ill-defined conditions(799.89)     Glaucoma    Sarcoid     Sarcoidosis     lung       Social History     Social History    Marital status: SINGLE     Spouse name: N/A    Number of children: N/A    Years of education: N/A     Social History Main Topics    Smoking status: Current Every Day Smoker     Packs/day: 0.25     Years: 6.00     Last attempt to quit: 2/6/2016    Smokeless tobacco: Never Used    Alcohol use No      Comment: quit 2 years or so ago    Drug use: No    Sexual activity: Yes     Partners: Female     Birth control/ protection: Condom     Other Topics Concern    None     Social History Narrative    ** Merged History Encounter **        Father,  Son getting  soon and he's assisting with the wedding. Review of Systems - negative except as listed above      Objective:     Vitals:    09/26/18 1012   BP: (!) 133/95   Pulse: 80   Resp: 20   Temp: 97.9 °F (36.6 °C)   TempSrc: Oral   SpO2: 92%   Weight: 182 lb (82.6 kg)   Height: 5' 9\" (1.753 m)       Physical Examination: General appearance - alert, well appearing, and in no distress  Eyes - pupils equal and reactive, extraocular eye movements intact  Ears - bilateral TM's and external ear canals normal  Nose - normal and patent, no erythema, discharge or polyps  Mouth - mucous membranes moist, pharynx normal without lesions  Neck - supple, no significant adenopathy  Chest - clear to auscultation, no wheezes, rales or rhonchi, symmetric air entry  Heart - normal rate, regular rhythm, normal S1, S2, no murmurs, rubs, clicks or gallops  Abdomen - soft, nontender, nondistended, no masses or organomegaly  Neurological - alert, oriented, normal speech, no focal findings or movement disorder noted  Musculoskeletal - no joint tenderness, deformity or swelling  Extremities - peripheral pulses normal, no pedal edema, no clubbing or cyanosis  Skin - normal coloration and turgor, no rashes, no suspicious skin lesions noted    Assessment/ Plan:   Diagnoses and all orders for this visit:    1. Essential hypertension  Follow-up with cardiology for medication management or changes  Take blood pressure log with him to this visit  2. Sarcoidosis  -     predniSONE (DELTASONE) 5 mg tablet; Take 1 Tab by mouth daily.  30 mg daily Indications: sarcoidosis     Follow-up Disposition:  Return in about 6 months (around 3/26/2019) for Complete Physical.  Patient was informed/counseled to:    1) Remember to stay active and/or exercise regularly (I suggest 30-45 minutes daily)   2) For reliable dietary information, go to www. EATRIGHT.org. You may wish to consider seeing the nutritionist at [facility] [phone], also consider the [address] 3) I routinely suggest a complete physical exam once each year (your birth month)  I have discussed the diagnosis with the patient and the intended plan as seen in the above orders. The patient has received an after-visit summary and questions were answered concerning future plans. Medication Side Effects and Warnings were discussed with patient: yes  Patient Labs were reviewed and or requested: yes  Patient Past Records were reviewed and or requested  yes  I have discussed the diagnosis with the patient and the intended plan as seen in the above orders. The patient has received an after-visit summary and questions were answered concerning future plans. Pt agrees to call or return to clinic and/or go to closest ER with any worsening of symptoms. This may include, but not limited to increased fever (>100.4) with NSAIDS or Tylenol, increased edema, confusion, rash, worsening of presenting symptoms.

## 2018-09-26 NOTE — MR AVS SNAPSHOT
[address] [place] [address]. [address] [id number] 
[phone] Patient: Sabina Alexandra MRN: [id number] :1965 Visit Information Date & Time Provider Department Dept. Phone Encounter #  
 2018  9:00 AM Veronica Reeves MD [facility] [phone] [id number] Follow-up Instructions Return in about 6 months (around 3/26/2019) for Complete Physical.  
 Follow-up and Disposition History Your Appointments 2018  9:00 AM  
Follow Up with Harman Trammell NP [address] ([facility] [place]) Appt Note: Follow up [address] [facility] [place] .[phone] [facility] [address]  
[address] [facility] [place] [address] Upcoming Health Maintenance Date Due FOBT Q 1 YEAR AGE 50-75 10/14/2018* Pneumococcal 19-64 Highest Risk (1 of 3 - PCV13) 2019* Shingrix Vaccine Age 50> (1 of 2) 2019* DTaP/Tdap/Td series (2 - Td) 10/25/2027 *Topic was postponed. The date shown is not the original due date. Allergies as of 2018  Review Complete On: 2018 By: Kristin Valderrama Severity Noted Reaction Type Reactions Pcn [Penicillins] High 2016    Anaphylaxis Shellfish Derived High 2016    Anaphylaxis Iodine  2010    Hives, Swelling Current Immunizations  Reviewed on 1/3/2016 Name Date Influenza Vaccine 2018 Influenza Vaccine Whole 2009 TD Vaccine 2009 Not reviewed this visit You Were Diagnosed With   
  
 Codes Comments Essential hypertension    -  Primary ICD-10-CM: I10 
ICD-9-CM: 401.9 Sarcoidosis     ICD-10-CM: D86.9 ICD-9-CM: 135 Vitals BP Pulse Temp Resp Height(growth percentile) Weight(growth percentile) (!) 133/95 (BP 1 Location: Right arm, BP Patient Position: Sitting) 80 97.9 °F (36.6 °C) (Oral) 20 5' 9\" (1.753 m) 182 lb (82.6 kg) SpO2 BMI Smoking Status 92% 26.88 kg/m2 Current Every Day Smoker Vitals History BMI and BSA Data Body Mass Index Body Surface Area  
 26.88 kg/m 2 2.01 m 2 Preferred Pharmacy Pharmacy Name Phone [email or web address] [email or web address] [facility] [address] [phone] Your Updated Medication List  
  
   
This list is accurate as of 9/26/18 10:39 AM.  Always use your most recent med list.  
  
  
  
  
 aspirin delayed-release 81 mg tablet Take 81 mg by mouth daily. calcium-vitamin D 500 mg(1,250mg) -200 unit per tablet Commonly known as:  CALCIUM 500+D Take 1 Tab by mouth two (2) times daily (with meals). carvedilol 25 mg tablet Commonly known as:  Saud Pintos Take 1 Tab by mouth two (2) times daily (with meals). Indications: chronic heart failure, hypertension  
  
 citalopram 40 mg tablet Commonly known as:  Mayur Peel Take 40 mg by mouth daily. fluticasone 50 mcg/actuation nasal spray Commonly known as:  Danielle Reges Use as directed  
  
 fluticasone-vilanterol 200-25 mcg/dose inhaler Commonly known as:  BREO ELLIPTA Take 1 Puff by inhalation daily. furosemide 20 mg tablet Commonly known as:  LASIX Take 1 Tab by mouth two (2) times a day. lidocaine 5 % Commonly known as:  LIDODERM  
1 Patch by TransDERmal route daily as needed for Pain. Apply patch to the affected area for 12 hours a day and remove for 12 hours a day. lithium carbonate  mg CR tablet Commonly known as:  ESKALITH CR Take 450 mg by mouth two (2) times a day. loratadine 10 mg tablet Commonly known as:  Easton Sor Take 1 Tab by mouth daily as needed for Allergies. losartan 100 mg tablet Commonly known as:  COZAAR Take 1 Tab by mouth daily. nitroglycerin 0.4 mg SL tablet Commonly known as:  NITROSTAT  
1 Tab by SubLINGual route every five (5) minutes as needed for Chest Pain (Max dose of 3). Omeprazole delayed release 20 mg tablet Commonly known as:  PRILOSEC D/R Take 2 Tabs by mouth daily. Take 10-20 min before breakfast  
  
 predniSONE 5 mg tablet Commonly known as:  Trishfrancoise Quevedoks Take 1 Tab by mouth daily. 30 mg daily  Indications: sarcoidosis Prescriptions Sent to Pharmacy Refills  
 predniSONE (DELTASONE) 5 mg tablet 2 Sig: Take 1 Tab by mouth daily. 30 mg daily  Indications: sarcoidosis Class: Normal  
 Pharmacy: [email or web address] [address]  Ph #: [phone] Route: Oral  
  
Follow-up Instructions Return in about 6 months (around 3/26/2019) for Complete Physical.  
  
  
Introducing [facility] HEALTH [facility]! [employer] introduces [employer]Spotter patient portal. Now you can access parts of your medical record, email your doctor's office, and request medication refills online. 1. In your internet browser, go to [email or web address]"t 2. Click on the First Time User? Click Here link in the Sign In box. You will see the New Member Sign Up page. 3. Enter your ShotSpotter Access Code exactly as it appears below. You will not need to use this code after youve completed the sign-up process. If you do not sign up before the expiration date, you must request a new code. · ShotSpotter Access Code: [email or web address] Expires: 12/25/2018 10:36 AM 
 
4. Enter the last four digits of your Social Security Number (xxxx) and Date of Birth (mm/dd/yyyy) as indicated and click Submit. You will be taken to the next sign-up page. 5. Create a [employer] Renew[employer] ID. This will be your ShotSpotter login ID and cannot be changed, so think of one that is secure and easy to remember. 6. Create a ShotSpotter password. You can change your password at any time. 7. Enter your Password Reset Question and Answer. This can be used at a later time if you forget your password. 8. Enter your e-mail address.  You will receive e-mail notification when new information is available in [employer]. 9. Click Sign Up. You can now view and download portions of your medical record. 10. Click the Download Summary menu link to download a portable copy of your medical information. If you have questions, please visit the Frequently Asked Questions section of the [employer] website. Remember, [employer] is NOT to be used for urgent needs. For medical emergencies, dial 911. Now available from your iPhone and Android! Please provide this summary of care documentation to your next provider. Your primary care clinician is listed as Joselin Reveles. If you have any questions after today's visit, please call [phone].

## 2018-10-26 ENCOUNTER — OFFICE VISIT (OUTPATIENT)
Dept: INTERNAL MEDICINE CLINIC | Age: 53
End: 2018-10-26

## 2018-10-26 VITALS
DIASTOLIC BLOOD PRESSURE: 114 MMHG | HEART RATE: 85 BPM | OXYGEN SATURATION: 91 % | SYSTOLIC BLOOD PRESSURE: 144 MMHG | RESPIRATION RATE: 19 BRPM | BODY MASS INDEX: 27.25 KG/M2 | TEMPERATURE: 98 F | WEIGHT: 184 LBS | HEIGHT: 69 IN

## 2018-10-26 DIAGNOSIS — D86.9 SARCOIDOSIS: Primary | ICD-10-CM

## 2018-10-26 DIAGNOSIS — Z12.11 COLON CANCER SCREENING: ICD-10-CM

## 2018-10-26 DIAGNOSIS — I10 ESSENTIAL HYPERTENSION WITH GOAL BLOOD PRESSURE LESS THAN 140/90: ICD-10-CM

## 2018-10-26 RX ORDER — PREDNISONE 5 MG/1
TABLET ORAL
Qty: 18 TAB | Refills: 0 | Status: SHIPPED | OUTPATIENT
Start: 2018-10-26 | End: 2018-11-01

## 2018-10-26 RX ORDER — AMLODIPINE BESYLATE 5 MG/1
5 TABLET ORAL DAILY
Qty: 30 TAB | Refills: 0 | Status: SHIPPED | OUTPATIENT
Start: 2018-10-26 | End: 2018-11-26 | Stop reason: SDUPTHER

## 2018-10-26 NOTE — PROGRESS NOTES
Chief Complaint   Patient presents with    Hypertension     Pt who presents for follow up of a pre-existing problem of hypertension. Diet and Lifestyle: generally follows a low fat low cholesterol diet, generally follows a low sodium diet  Home BP Monitoring: is well controlled at home  Use of agents associated with hypertension: none. Cardiovascular ROS: taking medications as instructed, no medication side effects noted, no TIA's, no chest pain on exertion, no dyspnea on exertion, no swelling of ankles. New concerns: Wants to increase prednisone. Reviewed and agree with Nurse Note and duplicated in this note. Reviewed PmHx, RxHx, FmHx, SocHx, AllgHx and updated and dated in the chart.     Family History   Problem Relation Age of Onset    Cancer Father     Diabetes Father     Heart Disease Father     Hypertension Father     Stroke Father     Diabetes Mother     Hypertension Mother        Past Medical History:   Diagnosis Date    Asthma     Bipolar disorder ([address]     Bi-polar, Anxiety    Cataracts, bilateral     DDD (degenerative disc disease)     Fracture of left humerus     non operative    Glaucoma     [facility] failure [address]     Degenerated heart failure    Hypertension     Ill-defined condition 2000    DX WITH SARCODOSIS    Ill-defined condition     USES O2 AT 3;30AM TO 5 AM EVERDAY & AS NEEDED    Ill-defined condition 2016    HEP C    Other ill-defined conditions(799.89)     Glaucoma    Sarcoid     Sarcoidosis     lung       Social History     Socioeconomic History    Marital status: SINGLE     Spouse name: Not on file    Number of children: Not on file    Years of education: Not on file    Highest education level: Not on file   Social Needs    Financial resource strain: Not on file    Food insecurity - worry: Not on file    Food insecurity - inability: Not on file   [employer] needs - medical: Not on file   [employer] needs - non-medical: Not on file Occupational History    Not on file   Tobacco Use    Smoking status: Current Every Day Smoker     Packs/day: 0.25     Years: 6.00     Pack years: 1.50     Last attempt to quit: 2016     Years since quittin.7    Smokeless tobacco: Never Used   Substance and Sexual Activity    Alcohol use: No     Alcohol/week: 0.0 oz     Comment: quit 2 years or so ago    Drug use: No    Sexual activity: Yes     Partners: Female     Birth control/protection: Condom   Other Topics Concern    Not on file   Social History Narrative    ** Merged History Encounter **        Father,  Son getting  soon and he's assisting with the wedding. Review of Systems - negative except as listed above      Objective:     Vitals:    10/26/18 0854   Weight: 184 lb (83.5 kg)   Height: 5' 9\" (1.753 m)       Physical Examination: General appearance - alert, well appearing, and in no distress  Eyes - pupils equal and reactive, extraocular eye movements intact  Ears - bilateral TM's and external ear canals normal  Nose - normal and patent, no erythema, discharge or polyps  Mouth - mucous membranes moist, pharynx normal without lesions  Neck - supple, no significant adenopathy  Chest - clear to auscultation, no wheezes, rales or rhonchi, symmetric air entry  Heart - normal rate, regular rhythm, normal S1, S2, no murmurs, rubs, clicks or gallops  Abdomen - soft, nontender, nondistended, no masses or organomegaly  Skin - LESIONS NOTED: lichenified on the right nares    Assessment/ Plan:   Diagnoses and all orders for this visit:    1. Colon cancer screening  -     OCCULT BLOOD IMMUNOASSAY,DIAGNOSTIC    2. Sarcoidosis    3. Essential hypertension with goal blood pressure less than 140/90    Other orders  -     amLODIPine (NORVASC) 5 mg tablet; Take 1 Tab by mouth daily. -     predniSONE (DELTASONE) 5 mg tablet; Take 4 Tabs by mouth daily for 3 days, THEN 2 Tabs daily for 3 days.     May increase his amlodipine at next visit to 10 mg if tolerated  Patient will set up appointment next week with pulmonologist for sarcoidosis  Follow-up Disposition:  Return in about 1 week (around 11/2/2018) for Blood Pressure Check. Medication Side Effects and Warnings were discussed with patient,  Patient Labs were reviewed and or requested, and  Patient Past Records were reviewed and or requested  yes       I have discussed the diagnosis with the patient and the intended plan as seen in the above orders. The patient has received an after-visit summary and questions were answered concerning future plans. Medication Side Effects and Warnings were discussed with patient,  Patient Labs were reviewed and or requested, and  Patient Past Records were reviewed and or requested  yes        Pt agrees to call or return to clinic and/or go to closest ER with any worsening of symptoms. This may include, but not limited to increased fever (>100.4) with NSAIDS or Tylenol, increased edema, confusion, rash, worsening of presenting symptoms.

## 2018-10-29 DIAGNOSIS — M54.41 CHRONIC LOW BACK PAIN WITH RIGHT-SIDED SCIATICA, UNSPECIFIED BACK PAIN LATERALITY: ICD-10-CM

## 2018-10-29 DIAGNOSIS — G89.29 CHRONIC LOW BACK PAIN WITH RIGHT-SIDED SCIATICA, UNSPECIFIED BACK PAIN LATERALITY: ICD-10-CM

## 2018-10-29 RX ORDER — LIDOCAINE 50 MG/G
1 PATCH TOPICAL
Qty: 1 PACKAGE | Refills: 12 | Status: SHIPPED | OUTPATIENT
Start: 2018-10-29 | End: 2019-04-25 | Stop reason: ALTCHOICE

## 2018-11-09 ENCOUNTER — OFFICE VISIT (OUTPATIENT)
Dept: INTERNAL MEDICINE CLINIC | Age: 53
End: 2018-11-09

## 2018-11-09 VITALS
WEIGHT: 184 LBS | DIASTOLIC BLOOD PRESSURE: 75 MMHG | TEMPERATURE: 98.1 F | BODY MASS INDEX: 27.25 KG/M2 | HEIGHT: 69 IN | HEART RATE: 86 BPM | RESPIRATION RATE: 18 BRPM | SYSTOLIC BLOOD PRESSURE: 106 MMHG | OXYGEN SATURATION: 88 %

## 2018-11-09 DIAGNOSIS — I10 ESSENTIAL HYPERTENSION: Primary | ICD-10-CM

## 2018-11-09 NOTE — PROGRESS NOTES
Chief Complaint   Patient presents with    Hypertension     Pt who presents for follow up of a pre-existing problem of hypertension. Diet and Lifestyle: generally follows a low fat low cholesterol diet, generally follows a low sodium diet, exercises regularly, nonsmoker  Home BP Monitoring: is not well controlled at home, ranging 140's/100's  Use of agents associated with hypertension: none. Cardiovascular ROS: taking medications as instructed, no medication side effects noted, no TIA's, no chest pain on exertion, no dyspnea on exertion, no swelling of ankles. New concerns: none. Reviewed and agree with Nurse Note and duplicated in this note. Reviewed PmHx, RxHx, FmHx, SocHx, AllgHx and updated and dated in the chart.     Family History   Problem Relation Age of Onset    Cancer Father     Diabetes Father     Heart Disease Father     Hypertension Father     Stroke Father     Diabetes Mother     Hypertension Mother        Past Medical History:   Diagnosis Date    Asthma     B[facility] disorder ([facility] [address]     Bi-polar, Anxiety    Cataracts, bilateral     DDD (degenerative disc disease)     Fracture of left humerus     non operative    Glaucoma     Heart failure ([facility] [address]     Degenerated heart failure    Hypertension     Ill-defined condition 2000    DX WITH SARCODOSIS    Ill-defined condition     USES O2 AT 3;30AM TO 5 AM EVERDAY & AS NEEDED    Ill-defined condition 2016    HEP C    Other ill-defined conditions(799.89)     Glaucoma    Sarcoid     Sarcoidosis     lung       Social History     Socioeconomic History    Marital status: SINGLE     Spouse name: Not on file    Number of children: Not on file    Years of education: Not on file    Highest education level: Not on file   Social Needs    Financial resource strain: Not on file    Food insecurity - worry: Not on file    Food insecurity - inability: Not on file    Transportation needs - medical: Not on file   [employer] needs - non-medical: Not on file   Occupational History    Not on file   Tobacco Use    Smoking status: Former Smoker     Packs/day: 0.25     Years: 6.00     Pack years: 1.50     Last attempt to quit: 3/6/2018     Years since quittin.6    Smokeless tobacco: Never Used   Substance and Sexual Activity    Alcohol use: No     Alcohol/week: 0.0 oz     Comment: quit 2 years or so ago    Drug use: No    Sexual activity: Yes     Partners: Female     Birth control/protection: Condom   Other Topics Concern    Not on file   Social History Narrative    ** Merged History Encounter **        Father,  Son getting  soon and he's assisting with the wedding. Review of Systems - negative except as listed above      Objective:     Vitals:    18 0846   BP: 106/75   Pulse: 86   Resp: 18   Temp: 98.1 °F (36.7 °C)   TempSrc: Oral   SpO2: (!) 88%   Weight: 184 lb (83.5 kg)   Height: 5' 9\" (1.753 m)       Physical Examination: General appearance - alert, well appearing, and in no distress  Chest - clear to auscultation, no wheezes, rales or rhonchi, symmetric air entry  Heart - normal rate, regular rhythm, normal S1, S2, no murmurs, rubs, clicks or gallops  Abdomen - soft, nontender, nondistended, no masses or organomegaly  Neurological - alert, oriented, normal speech, no focal findings or movement disorder noted  Musculoskeletal - no joint tenderness, deformity or swelling  Extremities - peripheral pulses normal, no pedal edema, no clubbing or cyanosis  Skin - normal coloration and turgor, no rashes, no suspicious skin lesions noted    Assessment/ Plan:   Diagnoses and all orders for this visit:    1. Essential hypertension    Will continue with current medication regimen. Patient will call back if he feels any dizziness or decrease in blood pressures.   Instructed to bring back a blood pressure log for next visit  Follow-up Disposition:  Return in about 4 weeks (around 2018) for Blood Pressure Check.    Medication Side Effects and Warnings were discussed with patient,  Patient Labs were reviewed and or requested, and  Patient Past Records were reviewed and or requested  yes       I have discussed the diagnosis with the patient and the intended plan as seen in the above orders. The patient has received an after-visit summary and questions were answered concerning future plans. Medication Side Effects and Warnings were discussed with patient,  Patient Labs were reviewed and or requested, and  Patient Past Records were reviewed and or requested  yes        Pt agrees to call or return to clinic and/or go to closest ER with any worsening of symptoms. This may include, but not limited to increased fever (>100.4) with NSAIDS or Tylenol, increased edema, confusion, rash, worsening of presenting symptoms.

## 2018-11-09 NOTE — PATIENT INSTRUCTIONS
DASH diet: Healthy eating to lower your blood pressure  The DASH diet emphasizes portion size, eating a variety of foods and getting the right amount of nutrients. Discover how DASH can improve your health and lower your blood pressure. DASH stands for Dietary Approaches to Stop Hypertension. The DASH diet is a lifelong approach to healthy eating that's designed to help treat or prevent high blood pressure (hypertension). The DASH diet encourages you to reduce the sodium in your diet and eat a variety of foods rich in nutrients that help lower blood pressure, such as potassium, calcium and magnesium. By following the DASH diet, you may be able to reduce your blood pressure by a few points in just two weeks. Over time, your blood pressure could drop by eight to 14 points, which can make a significant difference in your health risks. Because the DASH diet is a healthy way of eating, it offers health benefits besides just lowering blood pressure. The DASH diet may offer protection against osteoporosis, cancer, heart disease, stroke and diabetes. And while the DASH diet is not a weight-loss program, you may indeed lose unwanted pounds because it can help guide you toward healthier meals and snacks. DASH diet: Sodium levels  A key goal of the DASH diet is reducing how much sodium you eat, since sodium can dramatically increase blood pressure in people who are sensitive to its effects. In addition to the standard DASH diet, there is also a lower sodium version of the diet. You can choose the version of the diet that meets your health needs:   Standard DASH diet. You can consume up to 2,300 milligrams (mg) of sodium a day. Lower sodium DASH diet. You can consume up to 1,500 mg of sodium a day. Both versions of the DASH diet aim to reduce the amount of sodium in your diet compared with what you might get in a more traditional diet, which can amount to a whopping 3,500 mg of sodium a day or more.  That level is far beyond the recommendation of the 2005 Dietary Guidelines for Americans of a maximum of 2,300 mg of sodium a day. Studies show that the lower sodium version of the DASH diet is especially helpful in lowering blood pressure for adults who are middle-aged or older, for -Americans and for those who already have high blood pressure. If you aren't sure which version of the DASH diet is best for you, talk to your doctor. DASH diet: What to eat  Both sodium versions of the DASH diet include lots of whole grains, fruits, vegetables and low-fat dairy products. The DASH diet also includes some fish, poultry and legumes. You can eat red meat, sweets and fats in small amounts. The DASH diet is low in saturated fat, cholesterol and total fat. Here's a look at the recommended servings from each food group for the 2,109-[id number]-l-day DASH diet. Grains (6 to 8 servings a day)  Grains include bread, cereal, rice and pasta. Examples of one serving of grains include 1 slice whole-wheat bread, 1 ounce (oz.) dry cereal, or 1/2 cup cooked cereal, rice or pasta. Focus on whole grains because they have more fiber and nutrients than do refined grains. For instance, use brown rice instead of white rice, whole-wheat pasta instead of regular pasta and whole-grain bread instead of white bread. Look for products labeled \"100 percent whole grain\" or \"100 percent whole wheat. \"   Grains are naturally low in fat, so avoid spreading on butter or adding cream and cheese sauces. Vegetables (4 to 5 servings a day)  Tomatoes, carrots, broccoli, sweet potatoes, greens and other vegetables are full of fiber, vitamins, and such minerals as potassium and magnesium. Examples of one serving include 1 cup raw leafy green vegetables or 1/2 cup cut-up raw or cooked vegetables.    Don't think of vegetables only as side dishes -- a hearty blend of vegetables served over brown rice or whole-wheat noodles can serve as the main dish for a meal. Fresh or frozen vegetables are both good choices. When buying frozen and canned vegetables, choose those labeled as low sodium or without added salt. To increase the number of servings you fit in daily, be creative. In a stir-white, for instance, cut the amount of meat in half and double up on the vegetables. Fruits (4 to 5 servings a day)  Many fruits need little preparation to become a healthy part of a meal or snack. Like vegetables, they're packed with fiber, potassium and magnesium and are typically low in fat -- exceptions include avocados and coconuts. Examples of one serving include 1 medium fruit or 1/2 cup fresh, frozen or canned fruit. Have a piece of fruit with meals and one as a snack, then round out your day with a dessert of fresh fruits topped with a splash of low-fat yogurt. Leave on edible peels whenever possible. The peels of apples, pears and most fruits with pits add interesting texture to recipes and contain healthy nutrients and fiber. Remember that citrus fruits and juice, such as grapefruit, can interact with certain medications, so check with your doctor or pharmacist to see if they're OK for you. Dairy (2 to 3 servings a day)  Milk, yogurt, cheese and other dairy products are major sources of calcium, vitamin D and protein. But the key is to make sure that you choose dairy products that are low-fat or fat-free because otherwise they can be a major source of fat. Examples of one serving include 1 cup skim or 1% milk, 1 cup yogurt or 1 1/2 oz. cheese. Low-fat or fat-free frozen yogurt can help you boost the amount of dairy products you eat while offering a sweet treat. Add fruit for a healthy twist.   If you have trouble digesting dairy products, choose lactose-free products or consider taking an over-the-counter product that contains the enzyme lactase, which can reduce or prevent the symptoms of lactose intolerance.    Go easy on regular and even fat-free cheeses because they are typically high in sodium. Lean meat, poultry and fish (6 or fewer servings a day)  Meat can be a rich source of protein, B vitamins, iron and zinc. But because even lean varieties contain fat and cholesterol, don't make them a mainstay of your diet -- cut back typical meat portions by one-third or one-half and pile on the vegetables instead. Examples of one serving include 1 oz. cooked skinless poultry, seafood or lean meat, 1 egg, or 1 oz. water-packed, no-salt-added canned tuna. Trim away skin and fat from meat and then broil, grill, roast or poach instead of frying. Eat heart-healthy fish, such as salmon, herring and tuna. These types of fish are high in omega-3 fatty acids, which can help lower your total cholesterol. Nuts, seeds and legumes (4 to 5 servings a week)   Almonds, sunflower seeds, kidney beans, peas, lentils and other foods in this family are good sources of magnesium, potassium and protein. They're also full of fiber and phytochemicals, which are plant compounds that may protect against some cancers and cardiovascular disease. Serving sizes are small and are intended to be consumed weekly because these foods are high in calories. Examples of one serving include 1/3 cup (1 1/2 oz.) nuts, 2 tablespoons seeds or 1/2 cup cooked beans or peas. Nuts sometimes get a bad rap because of their fat content, but they contain healthy types of fat -- monounsaturated fat and omega-3 fatty acids. They're high in calories, however, so eat them in moderation. Try adding them to stir-fries, salads or cereals. Soybean-based products, such as tofu and tempeh, can be a good alternative to meat because they contain all of the amino acids your body needs to make a complete protein, just like meat. They also contain isoflavones, a type of natural plant compound (phytochemical) that has been shown to have some health benefits.   Fats and oils (2 to 3 servings a day)  Fat helps your body absorb essential vitamins and helps your body's immune system. But too much fat increases your risk of heart disease, diabetes and obesity. The DASH diet strives for a healthy balance by providing 30 percent or less of daily calories from fat, with a focus on the healthier unsaturated fats. Examples of one serving include 1 teaspoon soft margarine, 1 tablespoon low-fat mayonnaise or 2 tablespoons light salad dressing. Saturated fat and trans fat are the main dietary culprits in raising your blood cholesterol and increasing your risk of coronary artery disease. DASH helps keep your daily saturated fat to less than 10 percent of your total calories by limiting use of meat, butter, cheese, whole milk, cream and eggs in your diet, along with foods made from lard, solid shortenings, and palm and coconut oils. Avoid trans fat, commonly found in such processed foods as crackers, baked goods and fried items. Read food labels on margarine and salad dressing so that you can choose those that are lowest in saturated fat and free of trans fat. Sweets (5 or fewer a week)  You don't have to banish sweets entirely while following the DASH diet -- just go easy on them. Examples of one serving include 1 tablespoon sugar, jelly or jam, 1/2 cup sorbet or 1 cup (8 oz.) lemonade. When you eat sweets, choose those that are fat-free or low-fat, such as sorbets, fruit ices, jelly beans, hard candy, neelima crackers or low-fat cookies. Artificial sweeteners such as aspartame (NutraSweet, Equal) and sucralose (Splenda) may help satisfy your sweet tooth while sparing the sugar. But remember that you still must use them sensibly. It's OK to swap a diet cola for a regular cola, but not in place of a more nutritious beverage such as low-fat milk or even plain water. Cut back on added sugar, which has no nutritional value but can pack on calories. DASH diet: Alcohol and caffeine  Drinking too much alcohol can increase blood pressure.  The DASH diet recommends that men limit alcohol to two or fewer drinks a day and women one or less. The DASH diet doesn't address caffeine consumption. The influence of caffeine on blood pressure remains unclear. But caffeine can cause your blood pressure to rise at least temporarily. If you already have high blood pressure or if you think caffeine is affecting your blood pressure, talk to your doctor about your caffeine consumption. DASH diet and weight loss  The DASH diet is not designed to promote weight loss, but it can be used as part of an overall weight-loss strategy. The DASH diet is based on a diet of about 2,000 calories a day. If you're trying to lose weight, though, you may want to eat around 1,600 a day. You may need to adjust your serving goals based on your health or individual circumstances -- something your health care team can help you decide. Tips to cut back on sodium  The foods at the core of the DASH diet are naturally low in sodium. So just by following the DASH diet, you're likely to reduce your sodium intake. You also can cut back on sodium in your diet by:   Using sodium-free spices or flavorings with your food instead of salt   Not adding salt when cooking rice, pasta or hot cereal   Rinsing canned foods to remove some of the sodium   Buying foods labeled \"no salt added,\" \"sodium-free,\" \"low sodium\" or \"very low sodium\"  One teaspoon of table salt has about 2,300 mg of sodium, and 2/3 teaspoon of table salt has about 1,500 mg of sodium. When you read food labels, you may be surprised at just how much sodium some processed foods contain. Even low-fat soups, canned vegetables, ready-to-eat cereals and sliced turkey from the local deli -- all foods you may have considered healthy -- often have lots of sodium. You may not notice a difference in taste when you choose low-sodium food and beverages.  If things seem too bland, gradually introduce low-sodium foods and cut back on table salt until you reach your sodium goal. That'll give your palate time to adjust. It can take several weeks for your taste buds to get used to less salty foods. Putting the pieces of the DASH diet together   Try these strategies to get started on the DASH diet:   Change gradually. To boost your success, avoid dramatic changes in your eating approach. Instead, change one or two things at a time. If you now eat only one or two servings of fruits or vegetables a day, try to add a serving at lunch and one at dinner. Rather than switching to all whole grains, start by making one or two of your grain servings whole grains. Increasing fruits, vegetables and whole grains gradually can also help prevent bloating or diarrhea that may occur if you aren't used to eating a diet with lots of fiber. You can also try over-the-counter products to help reduce gas from beans and vegetables. Forgive yourself if you backslide. Everyone slips, especially when learning something new. Remember that changing your lifestyle is a long-term process. Find out what triggered your setback and then just  where you left off with the DASH diet. Reward successes. Reward yourself with a nonfood treat for your accomplishments. Add physical activity. To boost your blood pressure lowering efforts even more, consider increasing your physical activity in addition to following the DASH diet. Combining both the DASH diet and physical activity makes it more likely that you'll reduce your blood pressure. Get support if you need it. If you're having trouble sticking to your diet, talk to your doctor or dietitian about it. You might get some tips that will help you stick to the DASH diet. Remember, healthy eating isn't an all-or-nothing proposition. What's most important is that, on average, you eat healthier foods with plenty of variety -- both to keep your diet nutritious and to avoid boredom or extremes. And with the DASH diet, you can have both.

## 2018-11-13 DIAGNOSIS — I10 ESSENTIAL HYPERTENSION: ICD-10-CM

## 2018-11-13 DIAGNOSIS — I10 ESSENTIAL HYPERTENSION WITH GOAL BLOOD PRESSURE LESS THAN 140/90: ICD-10-CM

## 2018-11-13 RX ORDER — CARVEDILOL 25 MG/1
25 TABLET ORAL 2 TIMES DAILY WITH MEALS
Qty: 180 TAB | Refills: 3 | Status: SHIPPED | OUTPATIENT
Start: 2018-11-13 | End: 2019-04-03 | Stop reason: SDUPTHER

## 2018-11-26 DIAGNOSIS — I10 ESSENTIAL HYPERTENSION: ICD-10-CM

## 2018-11-26 RX ORDER — AMLODIPINE BESYLATE 5 MG/1
5 TABLET ORAL DAILY
Qty: 30 TAB | Refills: 0 | Status: SHIPPED | OUTPATIENT
Start: 2018-11-26

## 2019-01-18 ENCOUNTER — OFFICE VISIT (OUTPATIENT)
Dept: INTERNAL MEDICINE CLINIC | Age: 54
End: 2019-01-18

## 2019-01-18 VITALS
HEIGHT: 69 IN | HEART RATE: 91 BPM | DIASTOLIC BLOOD PRESSURE: 82 MMHG | OXYGEN SATURATION: 100 % | BODY MASS INDEX: 25.62 KG/M2 | SYSTOLIC BLOOD PRESSURE: 117 MMHG | WEIGHT: 173 LBS

## 2019-01-18 DIAGNOSIS — I73.9 CLAUDICATION OF BOTH LOWER EXTREMITIES (HCC): ICD-10-CM

## 2019-01-18 DIAGNOSIS — J96.11 CHRONIC RESPIRATORY FAILURE WITH HYPOXIA (HCC): ICD-10-CM

## 2019-01-18 DIAGNOSIS — E78.00 ELEVATED CHOLESTEROL: ICD-10-CM

## 2019-01-18 DIAGNOSIS — Z00.00 VISIT FOR WELL MAN HEALTH CHECK: Primary | ICD-10-CM

## 2019-01-18 DIAGNOSIS — J43.9 PULMONARY EMPHYSEMA, UNSPECIFIED EMPHYSEMA TYPE (HCC): ICD-10-CM

## 2019-01-18 DIAGNOSIS — R51.9 NONINTRACTABLE HEADACHE, UNSPECIFIED CHRONICITY PATTERN, UNSPECIFIED HEADACHE TYPE: ICD-10-CM

## 2019-01-18 DIAGNOSIS — F31.81 BIPOLAR 2 DISORDER (HCC): ICD-10-CM

## 2019-01-18 DIAGNOSIS — I50.9 ACUTE ON CHRONIC CONGESTIVE HEART FAILURE, UNSPECIFIED HEART FAILURE TYPE (HCC): ICD-10-CM

## 2019-01-18 RX ORDER — MELOXICAM 15 MG/1
15 TABLET ORAL DAILY
Qty: 30 TAB | Refills: 1 | Status: SHIPPED | OUTPATIENT
Start: 2019-01-18 | End: 2019-04-03 | Stop reason: SDUPTHER

## 2019-01-18 RX ORDER — PREDNISOLONE ACETATE 10 MG/ML
1 SUSPENSION/ DROPS OPHTHALMIC 4 TIMES DAILY
COMMUNITY

## 2019-01-18 NOTE — PROGRESS NOTES
Chief Complaint   Patient presents with    Well Male     he is a 48y.o. year old male who presents for CPE. Complete Physical Exam Questions:    1. Do you follow a low fat diet? yes  2. Are you up to date on your Tdap (<10 years)? Yes  3. Have you ever had a Pneumovax vaccine (>65)? Not applicable   [id number] Not applicable   [id number] Not applicable  4. Have you had Zoster vaccine (>60)? Not applicable  5. Have you had the HPV - Gardasil (13- 26)? Not applicable  6. Do you follow an exercise program?  yes  7. Do you smoke?  yes If > 65 and smoker, have you had a abdominal aortic aneurysm ultrasound screen? Not applicable  8. Do you consider yourself overweight?  no  9. Is there a family history of CAD< age 48? Yes  10. Is there a family history of Cancer? No  11. Do you know your Cancer risks? Not applicable  12. Have you had a colonoscopy? Yes  13. Have you been tested for HIV or other STI's? Yes HIV ISZGH(04-89 y/o)? No   14. Have you had an EKG in the last five years(>50)? Yes  15. Have you had a PSA test done this year (50-69)? Yes    Other complaints: none    Reviewed and agree with Nurse Note and duplicated in this note. Reviewed PmHx, RxHx, FmHx, SocHx, AllgHx and updated and dated in the chart.     Family History   Problem Relation Age of Onset    Cancer Father     Diabetes Father     Heart Disease Father     Hypertension Father     Stroke Father     Diabetes Mother     Hypertension Mother        Past Medical History:   Diagnosis Date    Asthma     Bipolar disorder ([address]     Bi-polar, Anxiety    Cataracts, bilateral     DDD (degenerative disc disease)     Fracture of left humerus     non operative    Glaucoma     Heart failure ([address]     Degenerated heart failure    Hypertension     Ill-defined condition 2000    DX WITH SARCODOSIS    Ill-defined condition     USES O2 AT 3;30AM TO 5 AM EVERDAY & AS NEEDED    Ill-defined condition 2016    HEP C    Other ill-defined conditions(799.89)     Glaucoma    Sarcoid     Sarcoidosis     lung       Social History     Socioeconomic History    Marital status: SINGLE     Spouse name: Not on file    Number of children: Not on file    Years of education: Not on file    Highest education level: Not on file   Tobacco Use    Smoking status: Former Smoker     Packs/day: 0.25     Years: 6.00     Pack years: 1.50     Last attempt to quit: 3/6/2018     Years since quittin.8    Smokeless tobacco: Never Used   Substance and Sexual Activity    Alcohol use: No     Alcohol/week: 0.0 oz     Comment: quit 2 years or so ago    Drug use: No    Sexual activity: Yes     Partners: Female     Birth control/protection: Condom   Social History Narrative    ** Merged History Encounter **        Father,  Son getting  soon and he's assisting with the wedding.               Review of Systems - negative except as listed above      Objective:     Vitals:    19 1002   BP: 117/82   Pulse: 91   SpO2: 100%   Weight: 173 lb (78.5 kg)   Height: 5' 9\" (1.753 m)       Physical Examination: General appearance - alert, well appearing, and in no distress  Eyes - pupils equal and reactive, extraocular eye movements intact  Ears - bilateral TM's and external ear canals normal  Nose - normal and patent, no erythema, discharge or polyps  Mouth - mucous membranes moist, pharynx normal without lesions  Neck - supple, no significant adenopathy  Chest - clear to auscultation, no wheezes, rales or rhonchi, symmetric air entry  Heart - normal rate, regular rhythm, normal S1, S2, no murmurs, rubs, clicks or gallops  Abdomen - soft, nontender, nondistended, no masses or organomegaly  Back exam - full range of motion, no tenderness, palpable spasm or pain on motion  Neurological - alert, oriented, normal speech, no focal findings or movement disorder noted  Musculoskeletal - no joint tenderness, deformity or swelling  Extremities - peripheral pulses normal, no pedal edema, no clubbing or cyanosis  Skin - normal coloration and turgor, no rashes, no suspicious skin lesions noted      Assessment/ Plan:   Diagnoses and all orders for this visit:    1. Visit for well man health check  -     CBC W/O DIFF  -     LIPID PANEL  -     METABOLIC PANEL, COMPREHENSIVE  -     PSA W/ REFLX FREE PSA       Follow-up Disposition:  Return if symptoms worsen or fail to improve. Labs to be drawn: CBC, CMP, Lipid      I have discussed the diagnosis with the patient and the intended plan as seen in the above orders. The patient has received an after-visit summary and questions were answered concerning future plans. Medication Side Effects and Warnings were discussed with patient,  Patient Labs were reviewed and or requested, and  Patient Past Records were reviewed and or requested  yes         Pt agrees to call or return to clinic and/or go to closest ER with any worsening of symptoms. This may include, but not limited to increased fever (>100.4) with NSAIDS or Tylenol, increased edema, confusion, rash, worsening of presenting symptoms.

## 2019-01-19 LAB
ALBUMIN SERPL-MCNC: 4.3 G/DL (ref 3.5–5.5)
ALBUMIN/GLOB SERPL: 1.3 {RATIO} (ref 1.2–2.2)
ALP SERPL-CCNC: 458 IU/L (ref 39–117)
ALT SERPL-CCNC: 63 IU/L (ref 0–44)
AST SERPL-CCNC: 53 IU/L (ref 0–40)
BILIRUB SERPL-MCNC: 0.6 MG/DL (ref 0–1.2)
BUN SERPL-MCNC: 12 MG/DL (ref 6–24)
BUN/CREAT SERPL: 13 (ref 9–20)
CALCIUM SERPL-MCNC: 9.4 MG/DL (ref 8.7–10.2)
CHLORIDE SERPL-SCNC: 104 MMOL/L (ref 96–106)
CHOLEST SERPL-MCNC: 216 MG/DL (ref 100–199)
CO2 SERPL-SCNC: 17 MMOL/L (ref 20–29)
CREAT SERPL-MCNC: 0.93 MG/DL (ref 0.76–1.27)
ERYTHROCYTE [DISTWIDTH] IN BLOOD BY AUTOMATED COUNT: 15.4 % (ref 12.3–15.4)
GLOBULIN SER CALC-MCNC: 3.2 G/DL (ref 1.5–4.5)
GLUCOSE SERPL-MCNC: 88 MG/DL (ref 65–99)
HCT VFR BLD AUTO: 48.3 % (ref 37.5–51)
HDLC SERPL-MCNC: 73 MG/DL
HGB BLD-MCNC: 16.8 G/DL (ref 13–17.7)
LDLC SERPL CALC-MCNC: 111 MG/DL (ref 0–99)
MCH RBC QN AUTO: 28.6 PG (ref 26.6–33)
MCHC RBC AUTO-ENTMCNC: 34.8 G/DL (ref 31.5–35.7)
MCV RBC AUTO: 82 FL (ref 79–97)
PLATELET # BLD AUTO: 176 X10E3/UL (ref 150–379)
POTASSIUM SERPL-SCNC: 4.8 MMOL/L (ref 3.5–5.2)
PROT SERPL-MCNC: 7.5 G/DL (ref 6–8.5)
PSA SERPL-MCNC: 1.5 NG/ML (ref 0–4)
RBC # BLD AUTO: 5.88 X10E6/UL (ref 4.14–5.8)
REFLEX CRITERIA: NORMAL
SODIUM SERPL-SCNC: 141 MMOL/L (ref 134–144)
TRIGL SERPL-MCNC: 161 MG/DL (ref 0–149)
VLDLC SERPL CALC-MCNC: 32 MG/DL (ref 5–40)
WBC # BLD AUTO: 6.9 X10E3/UL (ref 3.4–10.8)

## 2019-01-21 DIAGNOSIS — R74.8 ELEVATED ALKALINE PHOSPHATASE LEVEL: Primary | ICD-10-CM

## 2019-01-23 ENCOUNTER — OFFICE VISIT (OUTPATIENT)
Dept: HEMATOLOGY | Age: 54
End: 2019-01-23

## 2019-01-23 VITALS
OXYGEN SATURATION: 92 % | BODY MASS INDEX: 26.07 KG/M2 | SYSTOLIC BLOOD PRESSURE: 151 MMHG | WEIGHT: 176 LBS | TEMPERATURE: 98.7 F | HEART RATE: 99 BPM | HEIGHT: 69 IN | DIASTOLIC BLOOD PRESSURE: 103 MMHG

## 2019-01-23 DIAGNOSIS — R74.8 ELEVATED ALKALINE PHOSPHATASE LEVEL: Primary | ICD-10-CM

## 2019-01-23 NOTE — PROGRESS NOTES
1. Have you been to the ER, urgent care clinic since your last visit? Hospitalized since your last visit? Yes Where: Yes, [facility] for a fall    2. Have you seen or consulted any other health care providers outside of the [address] [place] [address] since your last visit? Include any pap smears or colon screening. Yes Where: Yes, PCP     Chief Complaint   Patient presents with    Follow-up     Visit Vitals  BP (!) 151/103 (BP 1 Location: Left arm, BP Patient Position: Sitting)   Pulse 99   Temp 98.7 °F (37.1 °C) (Tympanic)   Ht 5' 9\" (1.753 m)   Wt 176 lb (79.8 kg)   SpO2 92%   BMI 25.99 kg/m²        Learning Assessment 1/23/2019   PRIMARY LEARNER Patient   HIGHEST LEVEL OF EDUCATION - PRIMARY LEARNER  -   BARRIERS PRIMARY LEARNER NONE   CO-LEARNER CAREGIVER No   PRIMARY LANGUAGE ENGLISH   LEARNER PREFERENCE PRIMARY LISTENING   LEARNING SPECIAL TOPICS -   ANSWERED BY patient   RELATIONSHIP SELF     Abuse Screening Questionnaire 1/23/2019   Do you ever feel afraid of your partner? N   Are you in a relationship with someone who physically or mentally threatens you? N   Is it safe for you to go home?  Michelle Enriquez

## 2019-01-23 NOTE — PROGRESS NOTES
70 Amrik Brooks MD, [address], [place], 5800 [facility]       April Amelia Rubalcava, KAREY Boswell, PA-C    Sandy Patel, [facility]-BC   KAREY Gamez Mt, KAREY Oliveira [facility] 136    at [address]    [address] [facility], [facility] Nithin Leahy  22.    [phone]    FAX: [address] [place] [address] Avenue    [address], [address], [address]    [phone]    FAX: [phone]     Patient Care Team:  Vivian Vargas MD as PCP - General (Family Practice)  Luis Prado MD (Cardiology)  Rasheeda Medrano MD (General Surgery)  Mathew Parsons NP (Nurse Practitioner)  Rob Apgar, MD (Cardiology)  Antonina Claudio MD (Orthopedic Surgery)  Manuel Munoz MD (Pulmonary Disease)    Problem List  Date Reviewed: 2019          Codes Class Noted    Elevated alkaline phosphatase level ICD-10-CM: R74.8  ICD-9-CM: 790.5  2018        CAP (community acquired pneumonia) ICD-10-CM: J18.9  ICD-9-CM: 486  3/30/2018        AMI (acute myocardial infarction) [facility]) ICD-10-CM: I21.9  ICD-9-CM: 410.90  2017    Overview Addendum 2018 12:25 PM by Kristina Kebede., NP     Occurred in . Old radiology/procedures/imaging is not available for some reason today.              Chronic cholecystitis without calculus ICD-10-CM: K81.1  ICD-9-CM: 575.11  9/3/2559        Systolic CHF ([facility] [address] [facility]-33-MA: I50.20  ICD-9-CM: 428.20, 428.0  2015        Acute cholecystitis ICD-10-CM: K81.0  ICD-9-CM: 575.0  2015        Hypertension ICD-10-CM: I10  ICD-9-CM: 401.9  2014        Sarcoid ICD-10-CM: D86.9  ICD-9-CM: 986  Unknown        DDD (degenerative disc disease), lumbar ICD-10-CM: M51.36  ICD-9-CM: 722.52  Unknown        Asthma ICD-10-CM: J45.909  ICD-9-CM: 493.90  2013 Sarcoidosis ICD-10-CM: D86.9  ICD-9-CM: 765  1/8/2013        Bipolar 2 disorder ([facility] ICD-10-CM: F31.81  ICD-9-CM: 296.89  1/8/2013            Umer Galvan returns to the [address] for management of elevated alkaline phosphatase. The active problem list, all pertinent past medical history, medications, liver histology, radiologic findings and laboratory findings related to the liver disorder were reviewed with the patient. The patient is a 48 y.o. Black male who was first noted to have abnormalities in alkaline phosphatase going back to 2011. Serologic evaluation for markers of chronic liver disease were either not performed or available to me. Imaging of the liver was normal.      An assessment of liver fibrosis with elastography showed no fibrosis. The patient notes pain in his LLQ. His PCP is working with him to try to determine what is causing the pain. All work up has previously been negative. The patient has not experienced yellowing of the eyes or skin or problems concentrating. The patient has moderate limitations in functional activities which can be attributed to other medical problems not related to the liver disease. He had a recent eye injury and will be needing multiple surgeries to correct it. ASSESSMENT AND PLAN:  Persistent elevation in alkaline phosphatase and transaminases with no fibrosis. Now his ALT and AST are elevated. I am ordering all new serologies. ALP is elevated, tests of hepatic synthetic and metabolic function are normal, total bilirubin is normal, albumin is normal and the platelet count is normal.    Based upon laboratory studies, imaging and elastography, the patient does not appear to have advanced liver disease or cirrhosis. Serologic testing for causes of chronic liver disease will be ordered or repeated today. Will perform laboratory testing to monitor liver function and degree of liver injury.  This included BMP, hepatic panel, CBC with platelet count and INR. Treatment of other medical problems in patients with chronic liver disease  There are no contraindications for the patient to take any medications that are necessary for treatment of other medical issues. The patient can take oral diabetic agents for treatment of diabetes and statins for treatment of hypercholesterolemia. This will not impact the patient's current liver disease. The patient does not consume alcohol daily. Normal doses of acetaminophen can be used for pain as needed. Normal doses of acetaminophen as recommended on the label are not hepatotoxic, even in patients with cirrhosis. Counseling for alcohol in patients with chronic liver disease  The patient has not consumed alcohol since 2017. Vaccinations   The need for vaccination against viral hepatitis A and B will be assessed with serologic and instituted as appropriate. Routine vaccinations against other bacterial and viral agents can be performed as indicated. Annual flu vaccination should be administered if indicated. Screening for hepatocellular carcinoma  HCC screening is not necessary as the patient has no signs of cirrhosis. ALLERGIES  Allergies   Allergen Reactions    Pcn [Penicillins] Anaphylaxis    Shellfish Derived Anaphylaxis    Iodine Hives and Swelling     MEDICATIONS  Current Outpatient Medications   Medication Sig    prednisoLONE acetate (PRED FORTE) 1 % ophthalmic suspension Administer 1 Drop to both eyes four (4) times daily.  meloxicam (MOBIC) 15 mg tablet Take 1 Tab by mouth daily.  amLODIPine (NORVASC) 5 mg tablet Take 1 Tab by mouth daily.  carvedilol (COREG) 25 mg tablet Take 1 Tab by mouth two (2) times daily (with meals).  lidocaine (LIDODERM) 5 % 1 Patch by TransDERmal route daily as needed for Pain. Apply patch to the affected area for 12 hours a day and remove for 12 hours a day.     predniSONE (DELTASONE) 5 mg tablet Take 1 Tab by mouth daily. 30 mg daily  Indications: sarcoidosis    losartan (COZAAR) 100 mg tablet Take 1 Tab by mouth daily.  fluticasone-vilanterol (BREO ELLIPTA) 200-25 mcg/dose inhaler Take 1 Puff by inhalation daily.  loratadine (CLARITIN) 10 mg tablet Take 1 Tab by mouth daily as needed for Allergies.  fluticasone (FLONASE) 50 mcg/actuation nasal spray Use as directed    aspirin delayed-release 81 mg tablet Take 81 mg by mouth daily.  lithium carbonate CR (ESKALITH CR) 450 mg CR tablet Take 450 mg by mouth two (2) times a day.  citalopram (CELEXA) 40 mg tablet Take 40 mg by mouth daily.  Omeprazole delayed release (PRILOSEC D/R) 20 mg tablet Take 2 Tabs by mouth daily. Take 10-20 min before breakfast    calcium-vitamin D (CALCIUM 500+D) 500 mg(1,250mg) -200 unit per tablet Take 1 Tab by mouth two (2) times daily (with meals).  furosemide (LASIX) 20 mg tablet Take 1 Tab by mouth two (2) times a day.  nitroglycerin (NITROSTAT) 0.4 mg SL tablet 1 Tab by SubLINGual route every five (5) minutes as needed for Chest Pain (Max dose of 3). No current facility-administered medications for this visit. SYSTEM REVIEW NOT RELATED TO LIVER DISEASE OR REVIEWED ABOVE:  Constitution systems: Negative for fever, chills, weight gain, weight loss. Eyes: Negative for visual changes. ENT: Negative for sore throat, painful swallowing. Respiratory: Negative for cough, hemoptysis, SOB. Cardiology: Negative for chest pain, palpitations. GI:  Negative for constipation or diarrhea. : Negative for urinary frequency, dysuria, hematuria, nocturia. Skin: Negative for rash. Hematology: Negative for easy bruising, blood clots. Musculo-skeletal: Negative for back pain, muscle pain, weakness. Neurologic: Negative for headaches, dizziness, vertigo, memory problems not related to HE. Psychology: Negative for anxiety, depression. FAMILY HISTORY:  The father is alive and healthy.     The mother has hypertension. She is 92 or 93. There is no family history of liver disease. SOCIAL HISTORY:  The patient has never been . The patient has 2 children and 5 grandchildren. The patient stopped using tobacco products in 11/2017. The patient didn't drink daily but stopped drinking anything in 2017. The patient is currently receiving disability. PHYSICAL EXAMINATION:  Visit Vitals  BP (!) 151/103 (BP 1 Location: Left arm, BP Patient Position: Sitting)   Pulse 99   Temp 98.7 °F (37.1 °C) (Tympanic)   Ht 5' 9\" (1.753 m)   Wt 176 lb (79.8 kg)   SpO2 92%   BMI 25.99 kg/m²     General: No acute distress. Eyes: Sclera anicteric. ENT: No oral lesions. Nodes: No adenopathy. Skin: No spider angiomata. No jaundice. No palmar erythema. Respiratory: Lungs clear to auscultation. He has supplemental oxygen with him. Cardiovascular: Regular heart rate. No murmurs. No JVD. Abdomen: Soft non-tender. Liver size normal to percussion/palpation. Spleen not palpable. No obvious ascites. Extremities: No edema. No muscle wasting. No gross arthritic changes. Neurologic: Alert and oriented. Cranial nerves grossly intact. No asterixis.     LABORATORY STUDIES:  [place] of [address] Units 1/18/2019 8/17/2018 4/4/2018   WBC 3.4 - 10.8 x10E3/uL 6.9 8.1    ANC 1.8 - 8.0 K/UL      HGB 13.0 - 17.7 g/dL 16.8 18.2 (H)    HGB 12.1 - 17.0 GM/DL      HGB (iSTAT) 12.1 - 17.0 GM/DL       - 379 x10E3/uL 176 176    INR 0.9 - 1.1        AST 0 - 40 IU/L 53 (H) 32    ALT 0 - 44 IU/L 63 (H) 54 (H)    Alk Phos 39 - 117 IU/L 458 (H) 431 (H) 417 (H)   Bili, Total 0.0 - 1.2 mg/dL 0.6 1.3 (H)    Bili, Direct 0.00 - 0.40 mg/dL  0.32    Albumin 3.5 - 5.5 g/dL 4.3 4.3    BUN 6 - 24 mg/dL 12 8    BUN (iSTAT) 9 - 20 MG/DL      Creat 0.76 - 1.27 mg/dL 0.93 1.21    Creat (iSTAT) 0.6 - 1.3 MG/DL      Na 134 - 144 mmol/L 141 139    K 3.5 - 5.2 mmol/L 4.8 4.5    Cl 96 - 106 mmol/L 104 100    CO2 20 - 29 mmol/L 17 (L) 20    Glucose 65 - 99 mg/dL 88 92    Magnesium 1.6 - 2.4 mg/dL        Liver [place] of [address] Units 4/4/2018   WBC 3.4 - 10.8 x10E3/uL    ANC 1.8 - 8.0 K/UL    HGB 13.0 - 17.7 g/dL    HGB 12.1 - 17.0 GM/DL    HGB (iSTAT) 12.1 - 17.0 GM/DL     - 379 x10E3/uL    INR 0.9 - 1.1      AST 0 - 40 IU/L 37   ALT 0 - 44 IU/L 42   Alk Phos 39 - 117 IU/L 423 (H)   Bili, Total 0.0 - 1.2 mg/dL 0.6   Bili, Direct 0.00 - 0.40 mg/dL    Albumin 3.5 - 5.5 g/dL 4.1   BUN 6 - 24 mg/dL 11   BUN (iSTAT) 9 - 20 MG/DL    Creat 0.76 - 1.27 mg/dL 1.28 (H)   Creat (iSTAT) 0.6 - 1.3 MG/DL    Na 134 - 144 mmol/L 142   K 3.5 - 5.2 mmol/L 4.3   Cl 96 - 106 mmol/L 101   CO2 20 - 29 mmol/L 24   Glucose 65 - 99 mg/dL 91   Magnesium 1.6 - 2.4 mg/dL      SEROLOGIES:  Serologies Latest Ref Rng & Units 6/29/2018   C-ANCA Neg:<1:20 titer <1:20   P-ANCA Neg:<1:20 titer <1:20   ANCA Neg:<1:20 titer <1:20   M2 Ab 0.0 - 20.0 Units 8.7   Alpha-1 antitrypsin level 90 - 200 mg/dL 127     LIVER HISTOLOGY:  8/2018. FibroScan performed at [facility] [place]. EkPa was 4.6. IQR/med 22%. The results suggested a fibrosis level of F0. CAP was 182, not consistent with fatty liver disease. ENDOSCOPIC PROCEDURES:  Not available or performed    RADIOLOGY:  8/2018. Abdominal ultrasound. There is no acute abnormality in the abdomen. Small left kidney parenchyma calcification or non obstructing calculus.      OTHER TESTING:  Not available or performed    [facility]  [facility] [place] [facility] [address] [place], [address] Nithin Leahy  22.  [phone]

## 2019-01-24 LAB
A1AT PHENOTYP SERPL IFE: NORMAL
A1AT SERPL-MCNC: 142 MG/DL (ref 90–200)
ACE SERPL-CCNC: 71 U/L (ref 14–82)
ANA SER QL: NEGATIVE
C-ANCA TITR SER IF: ABNORMAL TITER
CERULOPLASMIN SERPL-MCNC: 35.7 MG/DL (ref 16–31)
FERRITIN SERPL-MCNC: 116 NG/ML (ref 30–400)
HAV AB SER QL IA: NEGATIVE
HBV SURFACE AB SER QL: NON REACTIVE
HBV SURFACE AG SERPL QL IA: NEGATIVE
IRON SATN MFR SERPL: 27 % (ref 15–55)
IRON SERPL-MCNC: 80 UG/DL (ref 38–169)
MYELOPEROXIDASE AB SER IA-ACNC: <9 U/ML (ref 0–9)
P-ANCA ATYPICAL TITR SER IF: ABNORMAL TITER
P-ANCA TITR SER IF: ABNORMAL TITER
PROTEINASE3 AB SER IA-ACNC: 3.8 U/ML (ref 0–3.5)
TIBC SERPL-MCNC: 298 UG/DL (ref 250–450)
UIBC SERPL-MCNC: 218 UG/DL (ref 111–343)

## 2019-01-25 LAB
HCV RNA SERPL QL NAA+PROBE: NEGATIVE
MITOCHONDRIA M2 IGG SER-ACNC: <20 UNITS (ref 0–20)

## 2019-01-31 NOTE — PROGRESS NOTES
Reviewed results with pt via phone. No serological cause for increased enzyme and ALP. Recommend twinrix as pt needs vaccination against both A and B. Pt verbalized understanding. Follow up as scheduled.

## 2019-02-05 ENCOUNTER — TELEPHONE (OUTPATIENT)
Dept: INTERNAL MEDICINE CLINIC | Age: 54
End: 2019-02-05

## 2019-02-05 NOTE — TELEPHONE ENCOUNTER
I called patient and let him know that he does not need to complete Dr. Boaz Soto labs if he [facility] the [facility].

## 2019-02-05 NOTE — TELEPHONE ENCOUNTER
Patient stated he was instructed to call and speak to Dr. Mariya Slade nurse about [facility] lab [facility]. Patient asked for a call back. Thank you!

## 2019-04-03 DIAGNOSIS — I10 ESSENTIAL HYPERTENSION: ICD-10-CM

## 2019-04-03 DIAGNOSIS — I10 ESSENTIAL HYPERTENSION WITH GOAL BLOOD PRESSURE LESS THAN 140/90: ICD-10-CM

## 2019-04-03 RX ORDER — MELOXICAM 15 MG/1
15 TABLET ORAL DAILY
Qty: 30 TAB | Refills: 1 | Status: SHIPPED | OUTPATIENT
Start: 2019-04-03 | End: 2019-11-11 | Stop reason: ALTCHOICE

## 2019-04-03 RX ORDER — CARVEDILOL 25 MG/1
25 TABLET ORAL 2 TIMES DAILY WITH MEALS
Qty: 180 TAB | Refills: 3 | Status: SHIPPED | OUTPATIENT
Start: 2019-04-03 | End: 2020-04-22 | Stop reason: SDUPTHER

## 2019-04-25 ENCOUNTER — OFFICE VISIT (OUTPATIENT)
Dept: INTERNAL MEDICINE CLINIC | Age: 54
End: 2019-04-25

## 2019-04-25 VITALS
DIASTOLIC BLOOD PRESSURE: 100 MMHG | BODY MASS INDEX: 25.37 KG/M2 | HEART RATE: 100 BPM | HEIGHT: 69 IN | SYSTOLIC BLOOD PRESSURE: 127 MMHG | TEMPERATURE: 98 F | OXYGEN SATURATION: 93 % | RESPIRATION RATE: 18 BRPM | WEIGHT: 171.3 LBS

## 2019-04-25 DIAGNOSIS — I10 ESSENTIAL HYPERTENSION: ICD-10-CM

## 2019-04-25 RX ORDER — LOSARTAN POTASSIUM 100 MG/1
100 TABLET ORAL DAILY
Qty: 90 TAB | Refills: 4 | Status: SHIPPED | OUTPATIENT
Start: 2019-04-25 | End: 2020-05-14 | Stop reason: SDUPTHER

## 2019-04-25 NOTE — PROGRESS NOTES
No chief complaint on file. Pt who presents for follow up of a pre-existing problem of hypertension. Diet and Lifestyle: not attempting to follow a low fat, low cholesterol diet, not attempting to follow a low sodium diet, smoker daily  Home BP Monitoring: is not well controlled at home, ranging 140's/100's  Use of agents associated with hypertension: none. Cardiovascular ROS: taking medications as instructed, no medication side effects noted, no TIA's, no chest pain on exertion, no dyspnea on exertion, no swelling of ankles. New concerns: none. Reviewed and agree with Nurse Note and duplicated in this note. Reviewed PmHx, RxHx, FmHx, SocHx, AllgHx and updated and dated in the chart. Family History   Problem Relation Age of Onset    Cancer Father     Diabetes Father     Heart Disease Father     Hypertension Father     Stroke Father     Diabetes Mother     Hypertension Mother        Past Medical History:   Diagnosis Date    Asthma     Bipolar disorder ([facility]     Bi-polar, Anxiety    Cataracts, bilateral     DDD (degenerative disc disease)     Fracture of left humerus     non operative    Glaucoma     Heart failure ([facility] [facility]     Degenerated heart failure    Hypertension     Ill-defined condition     DX WITH SARCODOSIS    Ill-defined condition     USES O2 AT 3;30AM TO 5 AM EVERDAY & AS NEEDED    Ill-defined condition     HEP C    Other ill-defined conditions(799.89)     Glaucoma    Sarcoid     Sarcoidosis     lung       Social History     Socioeconomic History    Marital status: SINGLE     Spouse name: Not on file    Number of children: Not on file    Years of education: Not on file    Highest education level: Not on file   Tobacco Use    Smoking status: Former Smoker     Packs/day: 0.25     Years: 6.00     Pack years: 1.50     Last attempt to quit: 3/6/2018     Years since quittin.1    Smokeless tobacco: Never Used   Substance and Sexual Activity    Alcohol use:  No Alcohol/week: 0.0 oz     Comment: quit 2 years or so ago    Drug use: No    Sexual activity: Yes     Partners: Female     Birth control/protection: Condom   Social History Narrative    ** Merged History Encounter **        Father,  Son getting  soon and he's assisting with the wedding. Review of Systems - negative except as listed above      Objective:     Vitals:    04/25/19 1017   BP: (!) 127/100   Pulse: 100   Resp: 18   Temp: 98 °F (36.7 °C)   TempSrc: Oral   SpO2: 93%   Weight: 171 lb 4.8 oz (77.7 kg)   Height: 5' 9\" (1.753 m)       Physical Examination: General appearance - alert, well appearing, and in no distress  Eyes - pupils equal and reactive, extraocular eye movements intact  Ears - bilateral TM's and external ear canals normal  Nose - normal and patent, no erythema, discharge or polyps  Mouth - mucous membranes moist, pharynx normal without lesions  Neck - supple, no significant adenopathy  Chest - clear to auscultation, no wheezes, rales or rhonchi, symmetric air entry  Heart - normal rate, regular rhythm, normal S1, S2, no murmurs, rubs, clicks or gallops  Abdomen - soft, nontender, nondistended, no masses or organomegaly  Musculoskeletal - no joint tenderness, deformity or swelling  Extremities - peripheral pulses normal, no pedal edema, no clubbing or cyanosis  Skin - normal coloration and turgor, no rashes, no suspicious skin lesions noted    Assessment/ Plan:   Diagnoses and all orders for this visit:    1. Essential hypertension  -     losartan (COZAAR) 100 mg tablet; Take 1 Tab by mouth daily. Patient is following up with cardiology and may, will defer any change in medications for cardiologist to make.   Patient will return in 2 months for nurse visit or follow-up         Medication Side Effects and Warnings were discussed with patient,  Patient Labs were reviewed and or requested, and  Patient Past Records were reviewed and or requested  yes       I have discussed the diagnosis with the patient and the intended plan as seen in the above orders. The patient has received an after-visit summary and questions were answered concerning future plans. Pt agrees to call or return to clinic and/or go to closest ER with any worsening of symptoms. This may include, but not limited to increased fever (>100.4) with NSAIDS or Tylenol, increased edema, confusion, rash, worsening of presenting symptoms.

## 2019-05-28 ENCOUNTER — OFFICE VISIT (OUTPATIENT)
Dept: HEMATOLOGY | Age: 54
End: 2019-05-28

## 2019-05-28 VITALS
HEART RATE: 78 BPM | DIASTOLIC BLOOD PRESSURE: 78 MMHG | OXYGEN SATURATION: 95 % | TEMPERATURE: 97.2 F | BODY MASS INDEX: 24.32 KG/M2 | HEIGHT: 69 IN | SYSTOLIC BLOOD PRESSURE: 100 MMHG | WEIGHT: 164.2 LBS | RESPIRATION RATE: 16 BRPM

## 2019-05-28 DIAGNOSIS — R74.8 ELEVATED ALKALINE PHOSPHATASE LEVEL: Primary | ICD-10-CM

## 2019-05-28 PROBLEM — J18.9 CAP (COMMUNITY ACQUIRED PNEUMONIA): Status: RESOLVED | Noted: 2018-03-30 | Resolved: 2019-05-28

## 2019-05-28 NOTE — Clinical Note
7/13/19 Patient: Delfin Ortez YOB: 1965 Date of Visit: 5/28/2019 Yessenia Garcia MD 
[address] [id number] [address] In Basket Dear Yessenia Garcia MD, Thank you for referring Mr. Chato Carlson to [facility] [facility] for evaluation. My notes for this consultation are attached. If you have questions, please do not hesitate to call me. I look forward to following your patient along with you. Sincerely, Bekah Ma MD

## 2019-05-28 NOTE — PROGRESS NOTES
[address] [facility], Crissy GAONA Wilber Cash, MD Merdis Cam, FARNAZ Fine, [facility]     Cindy Mcuhgh, [facility]   RICHARD Bird, [facility]       Triny Oliveira [facility] 136    at [address], [address] [place] [address] 22.    [phone]    FAX: [address] [place] [address], [address]    [phone]    FAX: [phone]       Patient Care Team:  Jennifer Thompson MD as PCP - General (Family Practice)  Janina Francois MD (Cardiology)  Tyron Wylie MD (General Surgery)  Olegario Gay NP (Nurse Practitioner)  Ana Recinos MD (Cardiology)  Jeanne Diaz MD (Orthopedic Surgery)  Promise Bran MD (Pulmonary Disease)      Problem List  Date Reviewed: 4/25/2019          Codes Class Noted    Elevated alkaline phosphatase level ICD-10-CM: R74.8  ICD-9-CM: 790.5  6/29/2018        CAP (community acquired pneumonia) ICD-10-CM: J18.9  ICD-9-CM: 486  3/30/2018        AMI (acute myocardial infarction) [facility]) ICD-10-CM: I21.9  ICD-9-CM: 410.90  1/1/2017    Overview Addendum 6/29/2018 12:25 PM by Kit Dos Santos., NP     Occurred in 2017. Old radiology/procedures/imaging is not available for some reason today.              Chronic cholecystitis without calculus ICD-10-CM: K81.1  ICD-9-CM: 575.11  9/5/4051        Systolic CHF ([facility] VBE-43-HC: I50.20  ICD-9-CM: 428.20, 428.0  12/30/2015        Acute cholecystitis ICD-10-CM: K81.0  ICD-9-CM: 575.0  12/28/2015        Hypertension ICD-10-CM: I10  ICD-9-CM: 401.9  11/13/2014        Sarcoid ICD-10-CM: D86.9  ICD-9-CM: 135  Unknown        DDD (degenerative disc disease), lumbar ICD-10-CM: M51.36  ICD-9-CM: 722.52  Unknown        Asthma ICD-10-CM: J45.909  ICD-9-CM: 493.90  1/8/2013        Sarcoidosis ICD-10-CM: D86.9  ICD-9-CM: 419  1/8/2013        Bipolar 2 disorder ([facility].) ICD-10-CM: F31.81  ICD-9-CM: 296.89  1/8/2013              [facility] for management of elevated alkaline phosphatase. The active problem list, all pertinent past medical history, medications, liver histology, radiologic findings and laboratory findings related to the liver disorder were reviewed with the patient. The patient is a 48 y.o. Black male who was found to have abnormalities in alkaline phosphatase in 2011. Serologic evaluation for markers of chronic liver disease was negative. The most recent imaging of the liver was Ultrasound performed in 8/2018. Results suggest that the liver is normal.      Assessment of liver fibrosis was performed with Fibroscan in 8/2018. The result was 4.6 kPa which correlates with   no fibrosis    The patient has no symptoms which can be attributed to the liver disorder. The patient has not experienced fatigue, pain in the right side over the liver,     The patient completes all daily activities without any functional limitations. ASSESSMENT AND PLAN:  Persistent elevation in alkaline phosphatase   The liver transaminases are intermittently elevated. The liver funciton is normal.  The platelet count is normal.  Based upon laboratory studies, imaging and elastography, the patient does not appear to have advanced liver disease or cirrhosis. Serologic testing for causes of chronic liver disease were negative. The need to perform a liver biopsy to help determine the cause and severity of the liver test abnormalities was discussed. The risks of performing the liver biopsy including pain, puncture of the lung, gallbladder, intestine or kidney and bleeding were discussed. The patient has decided to have a liver biopsy. This will be scheduled.     Treatment of other medical problems in patients with chronic liver disease  There are no contraindications for the patient to take any medications that are necessary for treatment of other medical issues. The patient can take oral diabetic agents for treatment of diabetes and statins for treatment of hypercholesterolemia. This will not impact the patient's current liver disease. Counseling for alcohol in patients with chronic liver disease  The patient has not consumed alcohol since 2017. Vaccinations   Vaccination for viral hepatitis A and B is recommended since the patient has no serologic evidence of previous exposure or vaccination with immunity. Routine vaccinations against other bacterial and viral agents can be performed as indicated. Annual flu vaccination should be administered if indicated. ALLERGIES  Allergies   Allergen Reactions    Pcn [Penicillins] Anaphylaxis    Shellfish Derived Anaphylaxis    Iodine Hives and Swelling     MEDICATIONS  Current Outpatient Medications   Medication Sig    methotrexate (RHEUMATREX) 2.5 mg tablet dose pack Take  by mouth.  losartan (COZAAR) 100 mg tablet Take 1 Tab by mouth daily.  meloxicam (MOBIC) 15 mg tablet Take 1 Tab by mouth daily.  carvedilol (COREG) 25 mg tablet Take 1 Tab by mouth two (2) times daily (with meals). Indications: chronic heart failure, high blood pressure    prednisoLONE acetate (PRED FORTE) 1 % ophthalmic suspension Administer 1 Drop to both eyes four (4) times daily.  amLODIPine (NORVASC) 5 mg tablet Take 1 Tab by mouth daily.  fluticasone-vilanterol (BREO ELLIPTA) 200-25 mcg/dose inhaler Take 1 Puff by inhalation daily.  loratadine (CLARITIN) 10 mg tablet Take 1 Tab by mouth daily as needed for Allergies.  fluticasone (FLONASE) 50 mcg/actuation nasal spray Use as directed    aspirin delayed-release 81 mg tablet Take 81 mg by mouth daily.     lithium carbonate CR (ESKALITH CR) 450 mg CR tablet Take 450 mg by mouth two (2) times a day.    citalopram (CELEXA) 40 mg tablet Take 40 mg by mouth daily.  Omeprazole delayed release (PRILOSEC D/R) 20 mg tablet Take 2 Tabs by mouth daily. Take 10-20 min before breakfast    calcium-vitamin D (CALCIUM 500+D) 500 mg(1,250mg) -200 unit per tablet Take 1 Tab by mouth two (2) times daily (with meals).  furosemide (LASIX) 20 mg tablet Take 1 Tab by mouth two (2) times a day. No current facility-administered medications for this visit. SYSTEM REVIEW NOT RELATED TO LIVER DISEASE OR REVIEWED ABOVE:  Constitution systems: Negative for fever, chills, weight gain, weight loss. Eyes: Negative for visual changes. ENT: Negative for sore throat, painful swallowing. Respiratory: Negative for cough, hemoptysis, SOB. Cardiology: Negative for chest pain, palpitations. GI:  Negative for constipation or diarrhea. : Negative for urinary frequency, dysuria, hematuria, nocturia. Skin: Negative for rash. Hematology: Negative for easy bruising, blood clots. Musculo-skeletal: Negative for back pain, muscle pain, weakness. Neurologic: Negative for headaches, dizziness, vertigo, memory problems not related to HE. Psychology: Negative for anxiety, depression. FAMILY HISTORY:  The father is alive and healthy. The mother has hypertension. She is 92 or 93. There is no family history of liver disease. SOCIAL HISTORY:  The patient has never been . The patient has 2 children and 5 grandchildren. The patient stopped using tobacco products in 11/2017. The patient didn't drink daily but stopped drinking anything in 2017. The patient is currently receiving disability. PHYSICAL EXAMINATION:  There were no vitals taken for this visit. General: No acute distress. Eyes: Sclera anicteric. ENT: No oral lesions. Nodes: No adenopathy. Skin: No spider angiomata. No jaundice. No palmar erythema. Respiratory: Lungs clear to auscultation. He has supplemental oxygen with him. Cardiovascular: Regular heart rate. No murmurs. No JVD. Abdomen: Soft non-tender. Liver size normal to percussion/palpation. Spleen not palpable. No obvious ascites. Extremities: No edema. No muscle wasting. No gross arthritic changes. Neurologic: Alert and oriented. Cranial nerves grossly intact. No asterixis. LABORATORY STUDIES:  Liver [place] [address] Units 5/28/2019   WBC 4.1 - 11.1 K/uL 5.5   ANC 1.8 - 8.0 K/UL 3.2   HGB 12.1 - 17.0 g/dL 18.0 (H)   HGB 12.1 - 17.0 GM/DL    HGB (iSTAT) 12.1 - 17.0 GM/DL     - 400 K/uL 211   INR 0.8 - 1.2 1.0   AST 15 - 37 U/L 31   ALT 12 - 78 U/L 22   Alk Phos 45 - 117 U/L 466 (H)   Bili, Total 0.2 - 1.0 MG/DL 1.1   Bili, Direct 0.00 - 0.40 mg/dL 0.32   Albumin 3.5 - 5.0 g/dL 4.2   BUN 6 - 20 MG/DL 12   BUN (iSTAT) 9 - 20 MG/DL    Creat 0.70 - 1.30 MG/DL 1.12   Creat (iSTAT) 0.6 - 1.3 MG/DL    Na 136 - 145 mmol/L 136   K 3.5 - 5.1 mmol/L 4.6   Cl 97 - 108 mmol/L 100   CO2 21 - 32 mmol/L 19 (L)   Glucose 65 - 100 mg/dL 84     SEROLOGIES:  Serologies Latest Ref Rng & Units 1/23/2019 6/29/2018   Hep A Ab, Total Negative Negative    Hep B Surface Ag Negative Negative    Hep B Surface AB QL  Non Reactive    Ferritin 30 - 400 ng/mL 116    Iron % Saturation 15 - 55 % 27    FELIX Ab, Direct Negative Negative    C-ANCA Neg:<1:20 titer <1:20 <1:20   P-ANCA Neg:<1:20 titer <1:20 <1:20   ANCA Neg:<1:20 titer <1:20 <1:20   M2 Ab 0.0 - 20.0 Units <20.0 8.7   Ceruloplasmin 16.0 - 31.0 mg/dL 35.7 (H)    Alpha-1 antitrypsin level 90 - 200 mg/dL 142 127     LIVER HISTOLOGY:  8/2018. [facility]. EkPa was 4.6. IQR/med 22%. The results suggested a fibrosis level of F0. CAP was 182, not consistent with fatty liver disease. ENDOSCOPIC PROCEDURES:  Not available or performed    RADIOLOGY:  8/2018. Abdominal ultrasound. There is no acute abnormality in the abdomen.  Small left kidney parenchyma calcification or non obstructing calculus. OTHER TESTING:  Not available or performed    FOLLOW-UP:  All of the issues listed above in the Assessment and Plan were discussed with the patient. All questions were answered. The patient expressed a clear understanding of the above. [address] [place] [address] in 2 weeks after liver biopsy.       MD Rian Chambers[address] [place] [address] 22.  [phone]  [address] [place] [address]

## 2019-05-28 NOTE — PROGRESS NOTES
Jovita Dickens is a 48 y.o. male    Exam RM: 3       No chief complaint on file. 1. Have you been to the ER, urgent care clinic since your last visit? Hospitalized since your last visit? NO     2. Have you seen or consulted any other health care providers outside of the [address] [place] [address] since your last visit? Include any pap smears or colon screening. NO     There are no preventive care reminders to display for this patient.       Visit Vitals  /78 (BP 1 Location: Left arm, BP Patient Position: Sitting)   Pulse 78   Temp 97.2 °F (36.2 °C) (Tympanic)   Resp 16   Ht 5' 9\" (1.753 m)   Wt 164 lb 3.2 oz (74.5 kg)   SpO2 95%   BMI 24.25 kg/m²

## 2019-05-29 LAB
ACTIN IGG SERPL-ACNC: 9 UNITS (ref 0–19)
ALBUMIN SERPL-MCNC: 4.2 G/DL (ref 3.5–5.5)
ALP SERPL-CCNC: 466 IU/L (ref 39–117)
ALT SERPL-CCNC: 22 IU/L (ref 0–44)
AST SERPL-CCNC: 31 IU/L (ref 0–40)
BASOPHILS # BLD AUTO: 0 X10E3/UL (ref 0–0.2)
BASOPHILS NFR BLD AUTO: 1 %
BILIRUB DIRECT SERPL-MCNC: 0.32 MG/DL (ref 0–0.4)
BILIRUB SERPL-MCNC: 1.1 MG/DL (ref 0–1.2)
BUN SERPL-MCNC: 12 MG/DL (ref 6–24)
BUN/CREAT SERPL: 11 (ref 9–20)
CALCIUM SERPL-MCNC: 9.5 MG/DL (ref 8.7–10.2)
CHLORIDE SERPL-SCNC: 100 MMOL/L (ref 96–106)
CO2 SERPL-SCNC: 19 MMOL/L (ref 20–29)
CREAT SERPL-MCNC: 1.12 MG/DL (ref 0.76–1.27)
EOSINOPHIL # BLD AUTO: 0.2 X10E3/UL (ref 0–0.4)
EOSINOPHIL NFR BLD AUTO: 4 %
ERYTHROCYTE [DISTWIDTH] IN BLOOD BY AUTOMATED COUNT: 14.4 % (ref 12.3–15.4)
GLUCOSE SERPL-MCNC: 84 MG/DL (ref 65–99)
HCT VFR BLD AUTO: 51.2 % (ref 37.5–51)
HGB BLD-MCNC: 18 G/DL (ref 13–17.7)
IMM GRANULOCYTES # BLD AUTO: 0 X10E3/UL (ref 0–0.1)
IMM GRANULOCYTES NFR BLD AUTO: 0 %
INR PPP: 1 (ref 0.8–1.2)
LYMPHOCYTES # BLD AUTO: 1.3 X10E3/UL (ref 0.7–3.1)
LYMPHOCYTES NFR BLD AUTO: 24 %
MCH RBC QN AUTO: 29.6 PG (ref 26.6–33)
MCHC RBC AUTO-ENTMCNC: 35.2 G/DL (ref 31.5–35.7)
MCV RBC AUTO: 84 FL (ref 79–97)
MONOCYTES # BLD AUTO: 0.7 X10E3/UL (ref 0.1–0.9)
MONOCYTES NFR BLD AUTO: 13 %
NEUTROPHILS # BLD AUTO: 3.2 X10E3/UL (ref 1.4–7)
NEUTROPHILS NFR BLD AUTO: 58 %
PLATELET # BLD AUTO: 211 X10E3/UL (ref 150–450)
POTASSIUM SERPL-SCNC: 4.6 MMOL/L (ref 3.5–5.2)
PROT SERPL-MCNC: 7.2 G/DL (ref 6–8.5)
PROTHROMBIN TIME: 10.8 SEC (ref 9.1–12)
RBC # BLD AUTO: 6.09 X10E6/UL (ref 4.14–5.8)
SODIUM SERPL-SCNC: 136 MMOL/L (ref 134–144)
WBC # BLD AUTO: 5.5 X10E3/UL (ref 3.4–10.8)

## 2019-06-06 ENCOUNTER — APPOINTMENT (OUTPATIENT)
Dept: ULTRASOUND IMAGING | Age: 54
End: 2019-06-06
Attending: INTERNAL MEDICINE
Payer: MEDICAID

## 2019-06-11 ENCOUNTER — APPOINTMENT (OUTPATIENT)
Dept: ULTRASOUND IMAGING | Age: 54
End: 2019-06-11
Attending: INTERNAL MEDICINE
Payer: MEDICAID

## 2019-06-11 ENCOUNTER — HOSPITAL ENCOUNTER (OUTPATIENT)
Age: 54
Setting detail: OUTPATIENT SURGERY
Discharge: HOME | End: 2019-06-11
Attending: INTERNAL MEDICINE | Admitting: INTERNAL MEDICINE
Payer: MEDICAID

## 2019-06-11 VITALS
RESPIRATION RATE: 14 BRPM | OXYGEN SATURATION: 88 % | HEART RATE: 57 BPM | SYSTOLIC BLOOD PRESSURE: 120 MMHG | DIASTOLIC BLOOD PRESSURE: 87 MMHG | TEMPERATURE: 97.5 F

## 2019-06-11 DIAGNOSIS — R74.8 ELEVATED LIVER ENZYMES: ICD-10-CM

## 2019-06-11 PROCEDURE — 77030014115: Performed by: INTERNAL MEDICINE

## 2019-06-11 PROCEDURE — 74011250636 HC RX REV CODE- 250/636

## 2019-06-11 PROCEDURE — 76040000019: Performed by: INTERNAL MEDICINE

## 2019-06-11 PROCEDURE — 77030013826 HC NDL BIOP MAXCOR BARD -B: Performed by: INTERNAL MEDICINE

## 2019-06-11 PROCEDURE — 76942 ECHO GUIDE FOR BIOPSY: CPT

## 2019-06-11 PROCEDURE — 74011250636 HC RX REV CODE- 250/636: Performed by: INTERNAL MEDICINE

## 2019-06-11 RX ORDER — HYDROMORPHONE HYDROCHLORIDE 1 MG/ML
INJECTION, SOLUTION INTRAMUSCULAR; INTRAVENOUS; SUBCUTANEOUS
Status: COMPLETED
Start: 2019-06-11 | End: 2019-06-11

## 2019-06-11 RX ORDER — LIDOCAINE HYDROCHLORIDE 10 MG/ML
10 INJECTION INFILTRATION; PERINEURAL ONCE
Status: DISCONTINUED | OUTPATIENT
Start: 2019-06-11 | End: 2019-06-11 | Stop reason: HOSPADM

## 2019-06-11 RX ORDER — SODIUM CHLORIDE 0.9 % (FLUSH) 0.9 %
5-40 SYRINGE (ML) INJECTION EVERY 8 HOURS
Status: DISCONTINUED | OUTPATIENT
Start: 2019-06-11 | End: 2019-06-11 | Stop reason: HOSPADM

## 2019-06-11 RX ORDER — SODIUM CHLORIDE 0.9 % (FLUSH) 0.9 %
5-40 SYRINGE (ML) INJECTION AS NEEDED
Status: DISCONTINUED | OUTPATIENT
Start: 2019-06-11 | End: 2019-06-11 | Stop reason: HOSPADM

## 2019-06-11 RX ORDER — ONDANSETRON 2 MG/ML
4 INJECTION INTRAMUSCULAR; INTRAVENOUS
Status: DISCONTINUED | OUTPATIENT
Start: 2019-06-11 | End: 2019-06-11 | Stop reason: HOSPADM

## 2019-06-11 RX ORDER — HYDROMORPHONE HYDROCHLORIDE 1 MG/ML
1 INJECTION, SOLUTION INTRAMUSCULAR; INTRAVENOUS; SUBCUTANEOUS
Status: DISCONTINUED | OUTPATIENT
Start: 2019-06-11 | End: 2019-06-11 | Stop reason: HOSPADM

## 2019-06-11 RX ADMIN — HYDROMORPHONE HYDROCHLORIDE 1 MG: 1 INJECTION, SOLUTION INTRAMUSCULAR; INTRAVENOUS; SUBCUTANEOUS at 08:22

## 2019-06-11 RX ADMIN — HYDROMORPHONE HYDROCHLORIDE 1 MG: 1 INJECTION, SOLUTION INTRAMUSCULAR; INTRAVENOUS; SUBCUTANEOUS at 09:55

## 2019-06-11 NOTE — H&P
[address] [facility]      Jessy Celestin MD, MD Bhaskar Warner PA-C Audra Mess, [facility]     April Jet Arenas, KAREY Mcclure, KAREY [facility] 136    at [facility]    [facility], [address] [place] [address], [facility] 22.    [phone]    FAX: [address] [place] [address], [address]    [phone]    FAX: [phone]         PRE-PROCEDURE NOTE - LIVER BIOPSY    H and P from last office visit reviewed. Allergies reviewed. Out-patient medication list reviewed. Patient Active Problem List   Diagnosis Code    Asthma J45.909    Sarcoidosis D86.9    [facility] ([facility] F31.81    DDD (degenerative disc disease), lumbar M51.36    Hypertension K08    Systolic CHF ([facility] N46.65    Chronic cholecystitis without calculus K81.1    Elevated alkaline phosphatase level R74.8    AMI (acute myocardial infarction) ([facility]) I21.9       Allergies   Allergen Reactions    Pcn [Penicillins] Anaphylaxis    Shellfish Derived Anaphylaxis    Iodine Hives and Swelling       No current facility-administered medications on file prior to encounter. Current Outpatient Medications on File Prior to Encounter   Medication Sig Dispense Refill    methotrexate (RHEUMATREX) 2.5 mg tablet dose pack Take  by mouth.  losartan (COZAAR) 100 mg tablet Take 1 Tab by mouth daily. 90 Tab 4    meloxicam (MOBIC) 15 mg tablet Take 1 Tab by mouth daily. 30 Tab 1    carvedilol (COREG) 25 mg tablet Take 1 Tab by mouth two (2) times daily (with meals).  Indications: chronic heart failure, high blood pressure 180 Tab 3    prednisoLONE acetate (PRED FORTE) 1 % ophthalmic suspension Administer 1 Drop to both eyes four (4) times daily.      amLODIPine (NORVASC) 5 mg tablet Take 1 Tab by mouth daily. 30 Tab 0    fluticasone-vilanterol (BREO ELLIPTA) 200-25 mcg/dose inhaler Take 1 Puff by inhalation daily. 60 Each 6    loratadine (CLARITIN) 10 mg tablet Take 1 Tab by mouth daily as needed for Allergies. 10 Tab 0    fluticasone (FLONASE) 50 mcg/actuation nasal spray Use as directed 1 Bottle 0    aspirin delayed-release 81 mg tablet Take 81 mg by mouth daily.  lithium carbonate CR (ESKALITH CR) 450 mg CR tablet Take 450 mg by mouth two (2) times a day.  citalopram (CELEXA) 40 mg tablet Take 40 mg by mouth daily.  Omeprazole delayed release (PRILOSEC D/R) 20 mg tablet Take 2 Tabs by mouth daily. Take 10-20 min before breakfast 120 Tab 12    calcium-vitamin D (CALCIUM 500+D) 500 mg(1,250mg) -200 unit per tablet Take 1 Tab by mouth two (2) times daily (with meals). 120 Tab 12    furosemide (LASIX) 20 mg tablet Take 1 Tab by mouth two (2) times a day. 61 Tab 0       For liver biopsy to assess for Sarcoidosis    The risks of the procedure were discussed with the patient. This included bleeding, pain, and puncture of other organs. All questions were answered. The patient wishes to proceed with the procedure. PHYSICAL EXAMINATION:  VS: per nursing note  General: No acute distress. Eyes: Sclera anicteric. ENT: No oral lesions. Thyroid normal.  Nodes: No adenopathy. Skin: No spider angiomata. No jaundice. No palmar erythema. Respiratory: Lungs clear to auscultation. Cardiovascular: Regular heart rate. No murmurs. No JVD. Abdomen: Soft non-tender, liver size normal to percussion/palpation. Spleen not palpable. No obvious ascites. Extremities: No edema. No muscle wasting. No gross arthritic changes. Neurologic: Alert and oriented. Cranial nerves grossly intact. No asterixis.       LABS:  Lab Results   Component Value Date/Time    WBC 5.5 05/28/2019 10:02 AM    Hemoglobin (POC) 18.7 (H) 12/28/2015 07:03 PM HGB 18.0 (H) 05/28/2019 10:02 AM    Hematocrit (POC) 55 (H) 12/28/2015 07:03 PM    HCT 51.2 (H) 05/28/2019 10:02 AM    PLATELET 157 31/74/9877 10:02 AM    MCV 84 05/28/2019 10:02 AM     Lab Results   Component Value Date/Time    INR 1.0 05/28/2019 10:02 AM    INR 0.9 11/09/2017 09:04 AM    INR 1.0 05/19/2016 12:20 PM    Prothrombin time 10.8 05/28/2019 10:02 AM    Prothrombin time 9.1 11/09/2017 09:04 AM    Prothrombin time 10.7 05/19/2016 12:20 PM       ASSESSMENT AND PLAN:  Liver biopsy under ultrasound guidance.     Julienne Donis MD  [address] 22.  [phone]  [address] [place] [address]

## 2019-06-11 NOTE — DISCHARGE INSTRUCTIONS
[address] [facility]      Mitesh Dawkins MD, MD Xenia Bose, FARNAZ Constantino, [facility]     April Anali Burleson, KAREY Lunsford, KAREY Pandya, KAREY Oliveira [facility] 136    at [address], [address] [place] [address], [facility] 22.    [phone]    FAX: [address] [place] [address], [address]    [phone]    FAX: [phone]         LIVER BIOPSY DISCHARGE INSTRUCTIONS      Lamont Jain  1965  Date: 6/11/2019    DIET:    Marilyn Walker may resume your previous diet. ACTIVITIES:  Rest quietly the rest of today. You should not lift any objects more than 20 pounds for the next 2 days. If you work sitting down without strenuous activity you may return to work tomorrow. If you exert yourself or do heavy lifting at work you should take tomorrow off. Do not drive or operate hazardous machinery for 12 hours. SPECIAL INSTRUCTIONS:  Do not use any aspirin or non-steroidal (Motin, Advil, Naproxen, etc) pain medications for the next 3 days. You may use extra-strength Tylenol (acetaminophen) if you experience pain or discomfort later today. Call the [address] office if you experience any of the following:  Persistent or severe abdominal pain. Persistent or severe abdominal distention. Fever and chills   Nausea and vomiting. New or unusual symptoms. Follow-up care: You should have a follow up appointment with Dr. Allie Colorado to review the results of the liver biopsy results in 2 weeks. If you do not have an appointment please call the office at the number listed above to schedule this. Other instructions:    If you have any problems or questions call the [facility] office at the phone number listed above. DISCHARGE SUMMARY from Nurse:     The following personal items collected during your admission are returned to you:   Dental Appliance:    Vision:    Hearing Aid:    Jewelry:    Clothing:    Other Valuables:    Valuables sent to safe:

## 2019-06-11 NOTE — PROCEDURES
[address] [facility]      Ronnie Grey MD, MD Arnel Randall, FARNAZ Arango, [facility]-BC     April Karely Coburn, KAREY Esteban, KAREY Templeton, KAREY Oliveira [facility] 136    at [address], [address] [place] [address] Leannejoanna  22.    [phone]    FAX: [address] [place] [address] Drive, [facility], [address] [place] [address] - [address]    [phone]    FAX: [phone]         LIVER BIOPSY PROCEDURE NOTE    Eric Milton  1965    INDICATIONS/PRE-OPERATIVE  DIAGNOSIS:  Saroidosis    : Ronnie Grey MD    SEDATION: 1% Lidocaine injection 10 ml    PROCEDURE:  Informed consent to perform the procedure was obtained from the patient. The patient was positioned on the edge of the stretcher lying flat in the supine position. Ultrasound was utilized to image the liver. The diaphragm and any major mass lesion or vascular structures within the liver were identified. An appropriate site for liver biopsy was identified. The distance from the surface of the skin to the liver capsule was 3 cm. This area was prepped with betadine and draped in sterile fashion. The skin was infiltrated with 1% lidocaine. The deeper subcutanous tissues and liver capsule overlying the biopsy site were then infiltrated with 1% lidocaine until appropriate anesthesia was obtained. A small incision was made in the skin so the biopsy devise could be easily inserted. A total of 4 passes with the 16 gauge Bard biopsy devise was then made into the liver. Core(s) of liver tissue totaling 4 cm in length were obtained and placed into tissue fixative. A band aid was placed over the biopsy site.   The patient was then repositioned on the right side and transported to the recovery area on the stretcher for routine monitoring until discharge. The specimen was sent to pathology for processing via the normal transport mechanism. SPECIMEN REMOVED: Liver    ESTIMATED BLOOD LOSS: Negligible.       POST-OPERATIVE DIAGNOSIS: Same as Pre-operative Diagnosis    Jeff Pantoja MD [facility], [facility] 22.  [phone]  [address] [place] [address]

## 2019-06-11 NOTE — ROUTINE PROCESS
Donald Lamar  1965  [id number]    Situation:  Verbal report received from: Ajay Sylvester RN  Procedure: Procedure(s):  LIVER BIOPSY    Background:    Preoperative diagnosis: Elevated serum alkaline phosphatase level [R74.8]  Postoperative diagnosis: * No post-op diagnosis entered *    :  Dr. Jose Carlos Andujar  Assistant(s): Endoscopy RN-1: Juan Miguel Hollis RN  Endoscopy RN-2: Nicola Koroma RN    Specimens:   ID Type Source Tests Collected by Time Destination   1 : liver biopsy Preservative   Aníbal Rodriguez MD 6/11/2019 7533 Pathology     H. Pylori  no    Assessment:  Intra-procedure medications     Anesthesia gave intra-procedure sedation and medications, see anesthesia flow sheet yes    Intravenous fluids: NS@ KVO     Vital signs stable     Abdominal assessment: round and soft     Recommendation:  Discharge patient per MD order.     Family or Friend   Permission to share finding with family or friend yes

## 2019-06-11 NOTE — PROGRESS NOTES
Bandaid changed-pad  covered with dry blood. New bandaid on with folded 4x4. Site without bruising or tender to touch,no hemstoma noted. Home via [address] [place] [address].

## 2019-06-12 ENCOUNTER — HOSPITAL ENCOUNTER (EMERGENCY)
Age: 54
Discharge: HOME | End: 2019-06-12
Attending: EMERGENCY MEDICINE
Payer: MEDICAID

## 2019-06-12 ENCOUNTER — APPOINTMENT (OUTPATIENT)
Dept: CT IMAGING | Age: 54
End: 2019-06-12
Attending: EMERGENCY MEDICINE
Payer: MEDICAID

## 2019-06-12 VITALS
SYSTOLIC BLOOD PRESSURE: 165 MMHG | RESPIRATION RATE: 18 BRPM | BODY MASS INDEX: 24.99 KG/M2 | OXYGEN SATURATION: 95 % | DIASTOLIC BLOOD PRESSURE: 103 MMHG | TEMPERATURE: 98 F | HEART RATE: 68 BPM | HEIGHT: 70 IN | WEIGHT: 174.56 LBS

## 2019-06-12 DIAGNOSIS — G89.18 POST-OP PAIN: Primary | ICD-10-CM

## 2019-06-12 LAB
ALBUMIN SERPL-MCNC: 3 G/DL (ref 3.5–5)
ALBUMIN/GLOB SERPL: 0.8 {RATIO} (ref 1.1–2.2)
ALP SERPL-CCNC: 426 U/L (ref 45–117)
ALT SERPL-CCNC: 67 U/L (ref 12–78)
ANION GAP SERPL CALC-SCNC: 8 MMOL/L (ref 5–15)
AST SERPL-CCNC: 52 U/L (ref 15–37)
BASOPHILS # BLD: 0 K/UL (ref 0–0.1)
BASOPHILS NFR BLD: 0 % (ref 0–1)
BILIRUB SERPL-MCNC: 0.7 MG/DL (ref 0.2–1)
BUN SERPL-MCNC: 13 MG/DL (ref 6–20)
BUN/CREAT SERPL: 12 (ref 12–20)
CALCIUM SERPL-MCNC: 8.9 MG/DL (ref 8.5–10.1)
CHLORIDE SERPL-SCNC: 107 MMOL/L (ref 97–108)
CO2 SERPL-SCNC: 27 MMOL/L (ref 21–32)
CREAT SERPL-MCNC: 1.1 MG/DL (ref 0.7–1.3)
DIFFERENTIAL METHOD BLD: NORMAL
EOSINOPHIL # BLD: 0.1 K/UL (ref 0–0.4)
EOSINOPHIL NFR BLD: 1 % (ref 0–7)
ERYTHROCYTE [DISTWIDTH] IN BLOOD BY AUTOMATED COUNT: 13 % (ref 11.5–14.5)
GLOBULIN SER CALC-MCNC: 3.9 G/DL (ref 2–4)
GLUCOSE SERPL-MCNC: 97 MG/DL (ref 65–100)
HCT VFR BLD AUTO: 42.9 % (ref 36.6–50.3)
HGB BLD-MCNC: 14.6 G/DL (ref 12.1–17)
IMM GRANULOCYTES # BLD AUTO: 0 K/UL (ref 0–0.04)
IMM GRANULOCYTES NFR BLD AUTO: 0 % (ref 0–0.5)
LIPASE SERPL-CCNC: 91 U/L (ref 73–393)
LYMPHOCYTES # BLD: 1.2 K/UL (ref 0.8–3.5)
LYMPHOCYTES NFR BLD: 15 % (ref 12–49)
MCH RBC QN AUTO: 29.5 PG (ref 26–34)
MCHC RBC AUTO-ENTMCNC: 34 G/DL (ref 30–36.5)
MCV RBC AUTO: 86.7 FL (ref 80–99)
MONOCYTES # BLD: 0.9 K/UL (ref 0–1)
MONOCYTES NFR BLD: 11 % (ref 5–13)
NEUTS SEG # BLD: 5.7 K/UL (ref 1.8–8)
NEUTS SEG NFR BLD: 73 % (ref 32–75)
NRBC # BLD: 0 K/UL (ref 0–0.01)
NRBC BLD-RTO: 0 PER 100 WBC
PLATELET # BLD AUTO: 153 K/UL (ref 150–400)
PMV BLD AUTO: 10.7 FL (ref 8.9–12.9)
POTASSIUM SERPL-SCNC: 3.1 MMOL/L (ref 3.5–5.1)
PROT SERPL-MCNC: 6.9 G/DL (ref 6.4–8.2)
RBC # BLD AUTO: 4.95 M/UL (ref 4.1–5.7)
SODIUM SERPL-SCNC: 142 MMOL/L (ref 136–145)
WBC # BLD AUTO: 8 K/UL (ref 4.1–11.1)

## 2019-06-12 PROCEDURE — 70450 CT HEAD/BRAIN W/O DYE: CPT

## 2019-06-12 PROCEDURE — 96374 THER/PROPH/DIAG INJ IV PUSH: CPT

## 2019-06-12 PROCEDURE — 83690 ASSAY OF LIPASE: CPT

## 2019-06-12 PROCEDURE — 85025 COMPLETE CBC W/AUTO DIFF WBC: CPT

## 2019-06-12 PROCEDURE — 36415 COLL VENOUS BLD VENIPUNCTURE: CPT

## 2019-06-12 PROCEDURE — 80053 COMPREHEN METABOLIC PANEL: CPT

## 2019-06-12 PROCEDURE — 99283 EMERGENCY DEPT VISIT LOW MDM: CPT

## 2019-06-12 PROCEDURE — 74176 CT ABD & PELVIS W/O CONTRAST: CPT

## 2019-06-12 PROCEDURE — 74011250636 HC RX REV CODE- 250/636: Performed by: EMERGENCY MEDICINE

## 2019-06-12 RX ORDER — MORPHINE SULFATE 4 MG/ML
2 INJECTION, SOLUTION INTRAMUSCULAR; INTRAVENOUS
Status: COMPLETED | OUTPATIENT
Start: 2019-06-12 | End: 2019-06-12

## 2019-06-12 RX ADMIN — MORPHINE SULFATE 2 MG: 4 INJECTION, SOLUTION INTRAMUSCULAR; INTRAVENOUS at 16:32
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
## 2019-06-12 NOTE — ED NOTES
Patient  given copy of dc instructions and 0 paper script(s) and 0 electronic scripts. Patient ( verbalized understanding of instructions and script (s). Patient given a current medication reconciliation form and verbalized understanding of their medications. Patient verbalized understanding of the importance of discussing medications with  his or her physician or clinic they will be following up with. Patient alert and oriented and in no acute distress. Patient offered wheelchair from treatment area to hospital entrance, patient declined wheelchair.

## 2019-06-12 NOTE — ED TRIAGE NOTES
Patient presents to the ED with c/o abdominal pain above the umbilicus that radiates to his right side. Pt reports vomiting. Pt denies any urinary symptoms. Pt reports that his gallbladder has been removed. Pt is alert and oriented. Pt skin is warm and dry. Pt is ambulatory independently. Emergency Department Nursing Plan of Care       The Nursing Plan of Care is developed from the Nursing assessment and Emergency Department Attending provider initial evaluation. The plan of care may be reviewed in the ED Provider note.     The Plan of Care was developed with the following considerations:   Patient / Family readiness to learn indicated by:verbalized understanding  Persons(s) to be included in education: patient  Barriers to Learning/Limitations:No    Signed     Norah Drought    6/12/2019   4:02 PM

## 2019-06-12 NOTE — DISCHARGE INSTRUCTIONS
Patient Education        Acute Pain After Surgery: Care Instructions  Your Care Instructions    It's common to have some pain after surgery. Pain doesn't mean that something is wrong or that the surgery didn't go well. But when the pain is severe, it's important to work with your doctor to manage it. It's also important to be aware of a few facts about pain and pain medicine. · You are the only person who knows what your pain feels like. So be sure to tell your doctor when you are in pain or when the pain changes. Then he or she will know how to adjust your medicines. · Pain is often easier to control right after it starts. So it may be better to take regular doses of pain medicine and not wait until the pain gets bad. · Medicine can help control pain. But this doesn't mean you'll have no pain. Medicine works to keep the pain at a level you can live with. With time, you will feel better. Follow-up care is a key part of your treatment and safety. Be sure to make and go to all appointments, and call your doctor if you are having problems. It's also a good idea to know your test results and keep a list of the medicines you take. How can you care for yourself at home? · Be safe with medicines. Read and follow all instructions on the label. ? If the doctor gave you a prescription medicine for pain, take it as prescribed. ? If you are not taking a prescription pain medicine, ask your doctor if you can take an over-the-counter medicine. · If you take an over-the-counter pain medicine, such as acetaminophen (Tylenol), ibuprofen (Advil, Motrin), or naproxen (Aleve), read and follow all instructions on the label. · Do not take two or more pain medicines at the same time unless the doctor told you to. · Do not drink alcohol while you are taking pain medicines. · Try to walk each day if your doctor recommends it. Start by walking a little more than you did the day before. Bit by bit, increase the amount you walk. Walking increases blood flow. It also helps prevent pneumonia and constipation. · To prevent constipation from opioid pain medicines:  ? Talk to your doctor about a laxative. ? Include fruits, vegetables, beans, and whole grains in your diet each day. These foods are high in fiber. ? Drink plenty of fluids, enough so that your urine is light yellow or clear like water. Drink water, fruit juice, or other drinks that do not contain caffeine or alcohol. If you have kidney, heart, or liver disease and have to limit fluids, talk with your doctor before you increase the amount of fluids you drink. ? Take a fiber supplement, such as Citrucel or Metamucil, every day if needed. Read and follow all instructions on the label. If you take pain medicine for more than a few days, talk to your doctor before you take fiber. When should you call for help? Call your doctor now or seek immediate medical care if:    · Your pain gets worse.     · Your pain is not controlled by medicine.    Watch closely for changes in your health, and be sure to contact your doctor if you have any problems. Where can you learn more? Go to http://marylin-chan.info/. Enter ([phone] [phone] in the search box to learn more about \"Acute Pain After Surgery: Care Instructions. \"  Current as of: September 23, 2018  Content Version: 11.9  © 6825-0050 [employer]. Care instructions adapted under license by [employer] (which disclaims liability or warranty for this information). If you have questions about a medical condition or this instruction, always ask your healthcare professional. [place] [address] any warranty or liability for your use of this information.

## 2019-06-13 ENCOUNTER — DOCUMENTATION ONLY (OUTPATIENT)
Dept: HEMATOLOGY | Age: 54
End: 2019-06-13

## 2019-06-13 NOTE — PROGRESS NOTES
Per Dr. Shannan Rahman, patient to  prescription of Dilaudid at . Confirmed with patient that  person will be Rubina Goodson, on phi list.  Patient had no further questions.

## 2019-06-27 ENCOUNTER — OFFICE VISIT (OUTPATIENT)
Dept: HEMATOLOGY | Age: 54
End: 2019-06-27

## 2019-06-27 VITALS
SYSTOLIC BLOOD PRESSURE: 129 MMHG | WEIGHT: 166 LBS | HEART RATE: 58 BPM | BODY MASS INDEX: 23.77 KG/M2 | OXYGEN SATURATION: 95 % | HEIGHT: 70 IN | RESPIRATION RATE: 16 BRPM | DIASTOLIC BLOOD PRESSURE: 83 MMHG | TEMPERATURE: 98.9 F

## 2019-06-27 DIAGNOSIS — D86.9 SARCOIDOSIS: Primary | ICD-10-CM

## 2019-06-27 NOTE — PROGRESS NOTES
Visit Vitals  /83 (BP 1 Location: Left arm, BP Patient Position: Sitting)   Pulse (!) 58   Temp 98.9 °F (37.2 °C) (Tympanic)   Resp 16   Ht 5' 10\" (1.778 m)   Wt 166 lb (75.3 kg)   SpO2 95%   BMI 23.82 kg/m²       Chief Complaint   Patient presents with    Chronic Liver Disease     Pt is here for f/u for liver disease. 1. Have you been to the ER, urgent care clinic since your last visit? Hospitalized since your last visit? Yes 13 June 2019 at [facility]     2. Have you seen or consulted any other health care providers outside of the [address] [place] [address] since your last visit? Include any pap smears or colon screening. No     There are no preventive care reminders to display for this patient.     Asa Barber is a 48 y.o. male

## 2019-06-27 NOTE — PROGRESS NOTES
[address] [facility], Jeff GAONA Nelle Daring, MD Baby Boon PA-BA Arenas, ACNP-BC     Cindy Mchugh, [facility]   ROGER Nash-BA Samuel, [facility]       Triny Liu De [address] 136    at [address], [address] [place] [address], Leannei  22.    [phone]    FAX: [address]    at [address], [address], [address]    [phone]    FAX: [phone]       Patient Care Team:  Triny Gilmore MD as PCP - General (Family Practice)  Lilia Gifford MD (Cardiology)  Jennifer Bañuelos MD (General Surgery)  Jefferson Prieto NP (Nurse Practitioner)  Elizabeth Crowell MD (Cardiology)  Jaylene Butler MD (Orthopedic Surgery)  Umer Patiño MD (Pulmonary Disease)      Problem List  Date Reviewed: 7/13/2019          Codes Class Noted    Hepatic granuloma associated with sarcoidosis ICD-10-CM: D86.89  ICD-9-CM: 092  6/29/2018        AMI (acute myocardial infarction) [facility]) ICD-10-CM: I21.9  ICD-9-CM: 410.90  1/1/2017    Overview Addendum 6/29/2018 12:25 PM by Greta Sanz NP     Occurred in 2017. Old radiology/procedures/imaging is not available for some reason today.              Chronic cholecystitis without calculus ICD-10-CM: K81.1  ICD-9-CM: 575.11  5/0/8984        Systolic CHF ([facility] KQT-72-OA: I50.20  ICD-9-CM: 428.20, 428.0  12/30/2015        Hypertension ICD-10-CM: I10  ICD-9-CM: 401.9  11/13/2014        DDD (degenerative disc disease), lumbar ICD-10-CM: M51.36  ICD-9-CM: 722.52  Unknown        Asthma ICD-10-CM: J45.909  ICD-9-CM: 493.90  1/8/2013        Bipolar 2 disorder ([facility] ICD-10-CM: F31.81  ICD-9-CM: 296.89  1/8/2013              Janessa Ruggiero returns to the [address] for management of elevated sarcoidosis. The active problem list, all pertinent past medical history, medications, liver histology, radiologic findings and laboratory findings related to the liver disorder were reviewed with the patient. The patient is a 48 y.o. Black male who was found to have abnormalities in alkaline phosphatase in 2011. Serologic evaluation for markers of chronic liver disease was negative. The patient underwent a liver biopsy in 6/2018. The procedure was well tolerated. I have personally reviewed the liver biopsy slides. This demonstrates granulomas consistent with hepatic sarcoidosis. The patient has no symptoms which can be attributed to the liver disorder. The patient has not experienced fatigue, pain in the right side over the liver,     The patient completes all daily activities without any functional limitations. ASSESSMENT AND PLAN:  Hepatic Sarcoidosis  The diagnosis was made by liver biopsy. Liver histology demonstrates non-caseating granulomas consistent with sarcoidosis and none-to only mild portal fibrosis. Hepatic Sarcoidosis does not lead to progressive liver injury or cirhrosis in 90% of patients. There is in general no reason to treat hepatic sarcoidosis since the natural history is benign. Persistent elevation in alkaline phosphatase   This is due to hepatic sarcoidosis. Serologic testing for causes of chronic liver disease was negative. No further evaluation is needed. Treatment of other medical problems in patients with chronic liver disease  There are no contraindications for the patient to take any medications that are necessary for treatment of other medical issues. The patient can take oral diabetic agents for treatment of diabetes and statins for treatment of hypercholesterolemia. This will not impact the patient's current liver disease.     Counseling for alcohol in patients with chronic liver disease  The patient has not consumed alcohol since 2017.    Vaccinations   Vaccination for viral hepatitis A and B is recommended since the patient has no serologic evidence of previous exposure or vaccination with immunity. Routine vaccinations against other bacterial and viral agents can be performed as indicated. Annual flu vaccination should be administered if indicated. ALLERGIES  Allergies   Allergen Reactions    Pcn [Penicillins] Anaphylaxis    Shellfish Derived Anaphylaxis    Iodine Hives and Swelling     MEDICATIONS  Current Outpatient Medications   Medication Sig    methotrexate (RHEUMATREX) 2.5 mg tablet dose pack Take  by mouth.  losartan (COZAAR) 100 mg tablet Take 1 Tab by mouth daily.  meloxicam (MOBIC) 15 mg tablet Take 1 Tab by mouth daily.  carvedilol (COREG) 25 mg tablet Take 1 Tab by mouth two (2) times daily (with meals). Indications: chronic heart failure, high blood pressure    prednisoLONE acetate (PRED FORTE) 1 % ophthalmic suspension Administer 1 Drop to both eyes four (4) times daily.  amLODIPine (NORVASC) 5 mg tablet Take 1 Tab by mouth daily.  fluticasone-vilanterol (BREO ELLIPTA) 200-25 mcg/dose inhaler Take 1 Puff by inhalation daily.  lithium carbonate CR (ESKALITH CR) 450 mg CR tablet Take 450 mg by mouth two (2) times a day.  citalopram (CELEXA) 40 mg tablet Take 40 mg by mouth daily.  Omeprazole delayed release (PRILOSEC D/R) 20 mg tablet Take 2 Tabs by mouth daily. Take 10-20 min before breakfast    calcium-vitamin D (CALCIUM 500+D) 500 mg(1,250mg) -200 unit per tablet Take 1 Tab by mouth two (2) times daily (with meals).  loratadine (CLARITIN) 10 mg tablet Take 1 Tab by mouth daily as needed for Allergies.  fluticasone (FLONASE) 50 mcg/actuation nasal spray Use as directed    aspirin delayed-release 81 mg tablet Take 81 mg by mouth daily.  furosemide (LASIX) 20 mg tablet Take 1 Tab by mouth two (2) times a day. No current facility-administered medications for this visit. SYSTEM REVIEW NOT RELATED TO LIVER DISEASE OR REVIEWED ABOVE:  Constitution systems: Negative for fever, chills, weight gain, weight loss. Eyes: Negative for visual changes. ENT: Negative for sore throat, painful swallowing. Respiratory: Negative for cough, hemoptysis, SOB. Cardiology: Negative for chest pain, palpitations. GI:  Negative for constipation or diarrhea. : Negative for urinary frequency, dysuria, hematuria, nocturia. Skin: Negative for rash. Hematology: Negative for easy bruising, blood clots. Musculo-skeletal: Negative for back pain, muscle pain, weakness. Neurologic: Negative for headaches, dizziness, vertigo, memory problems not related to HE. Psychology: Negative for anxiety, depression. FAMILY HISTORY:  The father is alive and healthy. The mother has hypertension. She is 92 or 93. There is no family history of liver disease. SOCIAL HISTORY:  The patient has never been . The patient has 2 children and 5 grandchildren. The patient stopped using tobacco products in 11/2017. The patient didn't drink daily but stopped drinking anything in 2017. The patient is currently receiving disability. PHYSICAL EXAMINATION:  Visit Vitals  /83 (BP 1 Location: Left arm, BP Patient Position: Sitting)   Pulse (!) 58   Temp 98.9 °F (37.2 °C) (Tympanic)   Resp 16   Ht 5' 10\" (1.778 m)   Wt 166 lb (75.3 kg)   SpO2 95%   BMI 23.82 kg/m²     General: No acute distress. Eyes: Sclera anicteric. ENT: No oral lesions. Nodes: No adenopathy. Skin: No spider angiomata. No jaundice. No palmar erythema. Respiratory: Lungs clear to auscultation. He has supplemental oxygen with him. Cardiovascular: Regular heart rate. No murmurs. No JVD. Abdomen: Soft non-tender. Liver size normal to percussion/palpation. Spleen not palpable. No obvious ascites. Extremities: No edema. No muscle wasting. No gross arthritic changes. Neurologic: Alert and oriented.  Cranial nerves grossly intact. No asterixis. LABORATORY STUDIES:  Liver [place] of [address] Units 5/28/2019   WBC 4.1 - 11.1 K/uL 5.5   ANC 1.8 - 8.0 K/UL 3.2   HGB 12.1 - 17.0 g/dL 18.0 (H)   HGB 12.1 - 17.0 GM/DL    HGB (iSTAT) 12.1 - 17.0 GM/DL     - 400 K/uL 211   INR 0.8 - 1.2 1.0   AST 15 - 37 U/L 31   ALT 12 - 78 U/L 22   Alk Phos 45 - 117 U/L 466 (H)   Bili, Total 0.2 - 1.0 MG/DL 1.1   Bili, Direct 0.00 - 0.40 mg/dL 0.32   Albumin 3.5 - 5.0 g/dL 4.2   BUN 6 - 20 MG/DL 12   BUN (iSTAT) 9 - 20 MG/DL    Creat 0.70 - 1.30 MG/DL 1.12   Creat (iSTAT) 0.6 - 1.3 MG/DL    Na 136 - 145 mmol/L 136   K 3.5 - 5.1 mmol/L 4.6   Cl 97 - 108 mmol/L 100   CO2 21 - 32 mmol/L 19 (L)   Glucose 65 - 100 mg/dL 84     SEROLOGIES:  Serologies Latest Ref Rng & Units 1/23/2019 6/29/2018   Hep A Ab, Total Negative Negative    Hep B Surface Ag Negative Negative    Hep B Surface AB QL  Non Reactive    Ferritin 30 - 400 ng/mL 116    Iron % Saturation 15 - 55 % 27    FELIX Ab, Direct Negative Negative    C-ANCA Neg:<1:20 titer <1:20 <1:20   P-ANCA Neg:<1:20 titer <1:20 <1:20   ANCA Neg:<1:20 titer <1:20 <1:20   M2 Ab 0.0 - 20.0 Units <20.0 8.7   Ceruloplasmin 16.0 - 31.0 mg/dL 35.7 (H)    Alpha-1 antitrypsin level 90 - 200 mg/dL 142 127     LIVER HISTOLOGY:  8/2018. FibroScan performed at [address]. EkPa was 4.6. IQR/med 22%. The results suggested a fibrosis level of F0. CAP was 182, not consistent with fatty liver disease. 6/2019. Slides reviewed by MLS. Hepatic granulomas consistent with sarcoidosis. No inflammation. None significant fibrosis. Margarita Pilar ENDOSCOPIC PROCEDURES:  Not available or performed    RADIOLOGY:  8/2018. Abdominal ultrasound. There is no acute abnormality in the abdomen. Small left kidney parenchyma calcification or non obstructing calculus.      OTHER TESTING:  Not available or performed    FOLLOW-UP:  All of the issues listed above in the Assessment and Plan were discussed with the patient. All questions were answered. The patient expressed a clear understanding of the above. [address] [place] [address] in 6 months for monitoring.         Evin Dumas MD  [address].  [phone]  [address] [place] [address]

## 2019-06-27 NOTE — Clinical Note
7/13/19 Patient: Cole Watts YOB: 1965 Date of Visit: 6/27/2019 Casimiro Minor MD 
[facility] [address] [address] VIA In Basket Dear Casimiro Minor MD, Thank you for referring Mr. Karen Power to [address] for evaluation. My notes for this consultation are attached. If you have questions, please do not hesitate to call me. I look forward to following your patient along with you. Sincerely, Jason Weiss MD

## 2019-06-28 ENCOUNTER — TELEPHONE (OUTPATIENT)
Dept: CARDIOLOGY CLINIC | Age: 54
End: 2019-06-28

## 2019-06-28 NOTE — TELEPHONE ENCOUNTER
Returned call to [place], 2 pt identifiers used    Advised no cardiac MRI available and last seen by Dr. Stefan Adams in 12/2017 but would send note, echo, stress test and cath report to fax # provided [phone].

## 2019-06-28 NOTE — TELEPHONE ENCOUNTER
Angela Haas with [facility] calling to obtain the cardiac MRI that was recently done as well as the last office note  Phone: [phone]  Fax: [phone]

## 2019-07-08 ENCOUNTER — CLINICAL SUPPORT (OUTPATIENT)
Dept: INTERNAL MEDICINE CLINIC | Age: 54
End: 2019-07-08

## 2019-07-08 VITALS
BODY MASS INDEX: 23.7 KG/M2 | WEIGHT: 165.2 LBS | DIASTOLIC BLOOD PRESSURE: 80 MMHG | HEART RATE: 93 BPM | SYSTOLIC BLOOD PRESSURE: 120 MMHG

## 2019-07-08 DIAGNOSIS — I10 ESSENTIAL HYPERTENSION: Primary | ICD-10-CM

## 2019-07-08 NOTE — PROGRESS NOTES
Asa Barber  in for BP check. He  states that he  takes amlodipine 5 mg 1 tab PO daily,losarton 100 mg 1 tab PO daily and coreg 25 mg 1 tab PO BID  /80   Pulse 93   Wt 165 lb 3.2 oz (74.9 kg)   BMI 23.70 kg/m²   Patient instructed to continue taking medication as prescribed  RTO in 2 months for doctors visit.   Per Dr. Dean Allen, DINORAHN

## 2019-07-13 PROBLEM — D86.89 HEPATIC GRANULOMA ASSOCIATED WITH SARCOIDOSIS: Status: ACTIVE | Noted: 2018-06-29

## 2019-08-15 ENCOUNTER — OFFICE VISIT (OUTPATIENT)
Dept: INTERNAL MEDICINE CLINIC | Age: 54
End: 2019-08-15

## 2019-08-15 VITALS
DIASTOLIC BLOOD PRESSURE: 83 MMHG | HEART RATE: 86 BPM | TEMPERATURE: 97.4 F | WEIGHT: 172.3 LBS | SYSTOLIC BLOOD PRESSURE: 112 MMHG | OXYGEN SATURATION: 95 % | HEIGHT: 70 IN | RESPIRATION RATE: 16 BRPM | BODY MASS INDEX: 24.67 KG/M2

## 2019-08-15 DIAGNOSIS — K21.9 GASTROESOPHAGEAL REFLUX DISEASE, ESOPHAGITIS PRESENCE NOT SPECIFIED: ICD-10-CM

## 2019-08-15 DIAGNOSIS — I10 ESSENTIAL HYPERTENSION: Primary | ICD-10-CM

## 2019-08-15 RX ORDER — PREDNISONE 10 MG/1
40 TABLET ORAL DAILY
COMMUNITY

## 2019-08-15 RX ORDER — RANITIDINE 300 MG/1
300 TABLET ORAL DAILY
Qty: 30 TAB | Refills: 3 | Status: SHIPPED | OUTPATIENT
Start: 2019-08-15 | End: 2019-11-11 | Stop reason: SDUPTHER

## 2019-08-15 NOTE — PROGRESS NOTES
Chief Complaint   Patient presents with    Heartburn     he is a 48y.o. year old male who presents for heartburn x 2 weeks. States that he is taking over-the-counter Prilosec with no resolution of symptoms. Patient states that he does not have any blood in stool or nausea or vomiting but has acid feeling after he belches. Hypertension - well controlled on Coreg and amlodipine along with Lasix. No headaches or dizziness. Not checking BP at home. Reviewed and agree with Nurse Note and duplicated in this note. Reviewed PmHx, RxHx, FmHx, SocHx, AllgHx and updated and dated in the chart.     Family History   Problem Relation Age of Onset    Cancer Father         pancreas    Diabetes Father     Heart Disease Father     Hypertension Father     Stroke Father     Diabetes Mother     Hypertension Mother        Past Medical History:   Diagnosis Date    Asthma     Bipolar disorder ([facility]     Bi-polar, Anxiety    Cataracts, bilateral     DDD (degenerative disc disease)     Fracture of left humerus     non operative    GERD (gastroesophageal reflux disease)     Glaucoma     bilateral    Heart failure ([facility]     Degenerated heart failure    Hypertension     Ill-defined condition     DX WITH SARCODOSIS    Ill-defined condition     USES O2 AT 3;30AM TO 5 AM EVERDAY & AS NEEDED    Ill-defined condition     HEP C    Other ill-defined conditions(799.89)     Glaucoma    Sarcoid     Sarcoidosis     lung       Social History     Socioeconomic History    Marital status: SINGLE     Spouse name: Not on file    Number of children: Not on file    Years of education: Not on file    Highest education level: Not on file   Tobacco Use    Smoking status: Former Smoker     Packs/day: 0.25     Years: 6.00     Pack years: 1.50     Last attempt to quit: 3/6/2018     Years since quittin.4    Smokeless tobacco: Never Used   Substance and Sexual Activity    Alcohol use: No     Alcohol/week: 0.0 standard drinks     Comment: quit 2 years or so ago    Drug use: No    Sexual activity: Yes     Partners: Female     Birth control/protection: Condom   Social History Narrative    ** Merged History Encounter **        Father,  Son getting  soon and he's assisting with the wedding. Review of Systems - negative except as listed above      Objective:     Vitals:    08/15/19 1516   BP: 112/83   Pulse: 86   Resp: 16   Temp: 97.4 °F (36.3 °C)   TempSrc: Oral   SpO2: 95%   Weight: 172 lb 4.8 oz (78.2 kg)   Height: 5' 10\" (1.778 m)       Physical Examination: General appearance - alert, well appearing, and in no distress  Eyes - pupils equal and reactive, extraocular eye movements intact  Ears - bilateral TM's and external ear canals normal  Nose - normal and patent, no erythema, discharge or polyps  Mouth - mucous membranes moist, pharynx normal without lesions  Neck - supple, no significant adenopathy  Chest - clear to auscultation, no wheezes, rales or rhonchi, symmetric air entry  Heart - normal rate, regular rhythm, normal S1, S2, no murmurs, rubs, clicks or gallops  Abdomen - soft, nontender, nondistended, no masses or organomegaly  Musculoskeletal - no joint tenderness, deformity or swelling  Extremities - peripheral pulses normal, no pedal edema, no clubbing or cyanosis  Skin - normal coloration and turgor, no rashes, no suspicious skin lesions noted    Assessment/ Plan:   Diagnoses and all orders for this visit:    1. Essential hypertension    2. Gastroesophageal reflux disease, esophagitis presence not specified    Other orders  -     raNITIdine (ZANTAC) 300 mg tab; Take 1 Tab by mouth daily. Follow-up in 6 months for blood pressure    I have reviewed/discussed the above normal BMI with the patient. I have recommended the following interventions: dietary management education, guidance, and counseling .       I have discussed the diagnosis with the patient and the intended plan as seen in the above orders. The patient has received an after-visit summary and questions were answered concerning future plans. Medication Side Effects and Warnings were discussed with patient,  Patient Labs were reviewed and or requested, and  Patient Past Records were reviewed and or requested  yes       Pt agrees to call or return to clinic and/or go to closest ER with any worsening of symptoms. This may include, but not limited to increased fever (>100.4) with NSAIDS or Tylenol, increased edema, confusion, rash, worsening of presenting symptoms.

## 2019-08-15 NOTE — PATIENT INSTRUCTIONS
DASH Diet: Care Instructions  Your Care Instructions    The DASH diet is an eating plan that can help lower your blood pressure. DASH stands for Dietary Approaches to Stop Hypertension. Hypertension is high blood pressure. The DASH diet focuses on eating foods that are high in calcium, potassium, and magnesium. These nutrients can lower blood pressure. The foods that are highest in these nutrients are fruits, vegetables, low-fat dairy products, nuts, seeds, and legumes. But taking calcium, potassium, and magnesium supplements instead of eating foods that are high in those nutrients does not have the same effect. The DASH diet also includes whole grains, fish, and poultry. The DASH diet is one of several lifestyle changes your doctor may recommend to lower your high blood pressure. Your doctor may also want you to decrease the amount of sodium in your diet. Lowering sodium while following the DASH diet can lower blood pressure even further than just the DASH diet alone. Follow-up care is a key part of your treatment and safety. Be sure to make and go to all appointments, and call your doctor if you are having problems. It's also a good idea to know your test results and keep a list of the medicines you take. How can you care for yourself at home? Following the DASH diet  · Eat 4 to 5 servings of fruit each day. A serving is 1 medium-sized piece of fruit, ½ cup chopped or canned fruit, 1/4 cup dried fruit, or 4 ounces (½ cup) of fruit juice. Choose fruit more often than fruit juice. · Eat 4 to 5 servings of vegetables each day. A serving is 1 cup of lettuce or raw leafy vegetables, ½ cup of chopped or cooked vegetables, or 4 ounces (½ cup) of vegetable juice. Choose vegetables more often than vegetable juice. · Get 2 to 3 servings of low-fat and fat-free dairy each day. A serving is 8 ounces of milk, 1 cup of yogurt, or 1 ½ ounces of cheese. · Eat 6 to 8 servings of grains each day.  A serving is 1 slice of bread, 1 ounce of dry cereal, or ½ cup of cooked rice, pasta, or cooked cereal. Try to choose whole-grain products as much as possible. · Limit lean meat, poultry, and fish to 2 servings each day. A serving is 3 ounces, about the size of a deck of cards. · Eat 4 to 5 servings of nuts, seeds, and legumes (cooked dried beans, lentils, and split peas) each week. A serving is 1/3 cup of nuts, 2 tablespoons of seeds, or ½ cup of cooked beans or peas. · Limit fats and oils to 2 to 3 servings each day. A serving is 1 teaspoon of vegetable oil or 2 tablespoons of salad dressing. · Limit sweets and added sugars to 5 servings or less a week. A serving is 1 tablespoon jelly or jam, ½ cup sorbet, or 1 cup of lemonade. · Eat less than 2,300 milligrams (mg) of sodium a day. If you limit your sodium to 1,500 mg a day, you can lower your blood pressure even more. Tips for success  · Start small. Do not try to make dramatic changes to your diet all at once. You might feel that you are missing out on your favorite foods and then be more likely to not follow the plan. Make small changes, and stick with them. Once those changes become habit, add a few more changes. · Try some of the following:  ? Make it a goal to eat a fruit or vegetable at every meal and at snacks. This will make it easy to get the recommended amount of fruits and vegetables each day. ? Try yogurt topped with fruit and nuts for a snack or healthy dessert. ? Add lettuce, tomato, cucumber, and onion to sandwiches. ? Combine a ready-made pizza crust with low-fat mozzarella cheese and lots of vegetable toppings. Try using tomatoes, squash, spinach, broccoli, carrots, cauliflower, and onions. ? Have a variety of cut-up vegetables with a low-fat dip as an appetizer instead of chips and dip. ? Sprinkle sunflower seeds or chopped almonds over salads. Or try adding chopped walnuts or almonds to cooked vegetables.   ? Try some vegetarian meals using beans and peas. Add garbanzo or kidney beans to salads. Make burritos and tacos with mashed hand beans or black beans. Where can you learn more? Go to http://marylin-chan.info/. Enter O621 in the search box to learn more about \"DASH Diet: Care Instructions. \"  Current as of: July 22, 2018  Content Version: 12.1  © 9812-0457 [employer], [employer]. Care instructions adapted under license by "[employer] (which disclaims liability or warranty for this information). If you have questions about a medical condition or this instruction, always ask your healthcare professional. [address] any warranty or liability for your use of this information.

## 2019-11-11 ENCOUNTER — OFFICE VISIT (OUTPATIENT)
Dept: INTERNAL MEDICINE CLINIC | Age: 54
End: 2019-11-11

## 2019-11-11 VITALS
WEIGHT: 180 LBS | HEIGHT: 70 IN | HEART RATE: 78 BPM | OXYGEN SATURATION: 90 % | RESPIRATION RATE: 22 BRPM | SYSTOLIC BLOOD PRESSURE: 94 MMHG | BODY MASS INDEX: 25.77 KG/M2 | TEMPERATURE: 97.7 F | DIASTOLIC BLOOD PRESSURE: 62 MMHG

## 2019-11-11 DIAGNOSIS — I50.22 CHRONIC SYSTOLIC CONGESTIVE HEART FAILURE (HCC): Primary | ICD-10-CM

## 2019-11-11 DIAGNOSIS — Z01.818 PRE-OP EVALUATION: ICD-10-CM

## 2019-11-11 RX ORDER — RANITIDINE 300 MG/1
300 TABLET ORAL DAILY
Qty: 30 TAB | Refills: 3 | Status: SHIPPED | OUTPATIENT
Start: 2019-11-11 | End: 2020-01-13 | Stop reason: SDUPTHER
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
## 2020-01-14 RX ORDER — RANITIDINE 300 MG/1
300 TABLET ORAL DAILY
Qty: 30 TAB | Refills: 3 | Status: SHIPPED | OUTPATIENT
Start: 2020-01-14

## 2020-01-15 ENCOUNTER — OFFICE VISIT (OUTPATIENT)
Dept: HEMATOLOGY | Age: 55
End: 2020-01-15

## 2020-01-15 VITALS
TEMPERATURE: 98.5 F | HEART RATE: 94 BPM | WEIGHT: 188 LBS | DIASTOLIC BLOOD PRESSURE: 72 MMHG | SYSTOLIC BLOOD PRESSURE: 111 MMHG | OXYGEN SATURATION: 92 % | BODY MASS INDEX: 26.92 KG/M2 | HEIGHT: 70 IN

## 2020-01-15 DIAGNOSIS — D86.9 SARCOID: Primary | ICD-10-CM

## 2020-01-15 NOTE — PROGRESS NOTES
Sabina Alexandra is a 60 y.o. male  Chief Complaint   Patient presents with    Follow-up     Pt came in the office with oxygen and expericing little shortness of breath, pt states that he just had eye surgery on his left eye, which makes his eyes very  watery. Pt also state of left side abdominal pain, pain scale 10. Visit Vitals  /72 (BP 1 Location: Left arm, BP Patient Position: Sitting)   Pulse 94   Temp 98.5 °F (36.9 °C) (Tympanic)   Ht 5' 10\" (1.778 m)   Wt 188 lb (85.3 kg)   SpO2 92%   BMI 26.98 kg/m²     3 most recent PHQ Screens 2/20/2018   Little interest or pleasure in doing things Not at all   Feeling down, depressed, irritable, or hopeless Not at all   Total Score PHQ 2 0     Learning Assessment 1/23/2019   PRIMARY LEARNER Patient   HIGHEST LEVEL OF EDUCATION - PRIMARY LEARNER  -   BARRIERS PRIMARY LEARNER NONE   CO-LEARNER CAREGIVER No   PRIMARY LANGUAGE ENGLISH   LEARNER PREFERENCE PRIMARY LISTENING   LEARNING SPECIAL TOPICS -   ANSWERED BY patient   RELATIONSHIP SELF     Abuse Screening Questionnaire 1/23/2019   Do you ever feel afraid of your partner? N   Are you in a relationship with someone who physically or mentally threatens you? N   Is it safe for you to go home? Y         1. Have you been to the ER, urgent care clinic since your last visit? Hospitalized since your last visit? Pt seen in [facility] ER due to heart attack on November 3 2019.    2. Have you seen or consulted any other health care providers outside of the [address] [place] [address] since your last visit? Include any pap smears or colon screening.  No

## 2020-01-15 NOTE — PROGRESS NOTES
[address] Amrik Brooks MD, Moira Gunter, Clari Morenojoanna Red, [place]       KAREY Serrato PA-C Bennet Colace, JEANETTE-BC   KAREY Diamond NP [address] [facility] [address]    at [facility]    [facility], [address] [place] [address], [facility].    [phone]    FAX: [address] [place] [address], [address], [address]    [phone]    FAX: [phone]     Patient Care Team:  Ethel Joseph MD as PCP - General (Family Practice)  Ethel Joseph MD as PCP - [facility]  Estrella Yadav MD (Cardiology)  Rashid Stein MD (General Surgery)  Janey Downing NP (Nurse Practitioner)  Annika Kelly MD (Cardiology)  Zach Chamberlain MD (Orthopedic Surgery)  Jolie Cooper MD (Pulmonary Disease)    Problem List  Date Reviewed: 11/11/2019          Codes Class Noted    Hepatic granuloma associated with sarcoidosis ICD-10-CM: D86.89  ICD-9-CM: 579  6/29/2018        AMI (acute myocardial infarction) [facility]) ICD-10-CM: I21.9  ICD-9-CM: 410.90  1/1/2017    Overview Addendum 6/29/2018 12:25 PM by Millie Peña NP     Occurred in 2017. Old radiology/procedures/imaging is not available for some reason today.              Chronic cholecystitis without calculus ICD-10-CM: K81.1  ICD-9-CM: 575.11  3/8/7385        Systolic CHF ([facility] NFO-21-DF: I50.20  ICD-9-CM: 428.20, 428.0  12/30/2015        Hypertension ICD-10-CM: I10  ICD-9-CM: 401.9  11/13/2014        DDD (degenerative disc disease), lumbar ICD-10-CM: M51.36  ICD-9-CM: 722.52  Unknown        Asthma ICD-10-CM: J45.909  ICD-9-CM: 493.90  1/8/2013        Bipolar 2 disorder ([facility] ICD-10-CM: F31.81  ICD-9-CM: 296.89  1/8/2013            Josefina Klein returns to the [facility] for management of elevated alkaline phosphatase. The active problem list, all pertinent past medical history, medications, liver histology, radiologic findings and laboratory findings related to the liver disorder were reviewed with the patient. The patient is a 60 y.o. Black male who was first noted to have abnormalities in alkaline phosphatase going back to 2011. Serologic evaluation for markers of chronic liver disease were either not performed or available to me. Imaging of the liver was normal.      An assessment of liver fibrosis with elastography showed no fibrosis. The patient notes pain in his LLQ. His PCP is working with him to try to determine what is causing the pain. All work up has previously been negative. The patient has not experienced yellowing of the eyes or skin or problems concentrating. The patient has moderate limitations in functional activities which can be attributed to other medical problems not related to the liver disease. Since I have seen him last, he has had an AMI, which he was told was due to fluid overload. He did not have stents placed. He now has the LifeVest and sleeps with it on at night. When he had his MI, grandson put it on him to alert EMS, etc.     He had another eye surgery on 12/5/2019. His prednisone was increased up to 60 mg. He requires about 4L O2 NC when walking longer distances and sleeps with it on at 3L. ASSESSMENT AND PLAN:  Persistent elevation in alkaline phosphatase and transaminases with no fibrosis. The AST is elevated. The ALT is normal. ALP is elevated, tests of hepatic synthetic and metabolic function are normal, total bilirubin is normal, albumin is normal and the platelet count is normal.    Will perform laboratory testing to monitor liver function and degree of liver injury. This included BMP, hepatic panel, CBC with platelet count and INR.     Treatment of other medical problems in patients with chronic liver disease  There are no contraindications for the patient to take any medications necessary for treatment of other medical issues. The patient can take oral diabetic agents for treatment of diabetes and statins for treatment of hypercholesterolemia. This will not impact the patient's current liver disease. The patient does not consume alcohol daily. Normal doses of acetaminophen can be used for pain as needed. Normal doses of acetaminophen as recommended on the label are not hepatotoxic, even in patients with cirrhosis. Counseling for alcohol in patients with chronic liver disease  The patient has not consumed alcohol since 2017. Vaccinations   He has received vaccinations for A and B but was told he is still not showing immunity. They have checked twice after he received the series. Routine vaccinations against other bacterial and viral agents can be performed as indicated. Annual flu vaccination should be administered if indicated. Screening for hepatocellular carcinoma  HCC screening is not necessary as the patient has no signs of cirrhosis. ALLERGIES  Allergies   Allergen Reactions    Latex Anaphylaxis    Pcn [Penicillins] Anaphylaxis    Shellfish Derived Anaphylaxis    Iodine Hives and Swelling     MEDICATIONS  Current Outpatient Medications   Medication Sig    raNITIdine (ZANTAC) 300 mg tab Take 1 Tab by mouth daily.  predniSONE (DELTASONE) 10 mg tablet Take 40 mg by mouth daily.  methotrexate (RHEUMATREX) 2.5 mg tablet dose pack Take  by mouth.  losartan (COZAAR) 100 mg tablet Take 1 Tab by mouth daily.  carvedilol (COREG) 25 mg tablet Take 1 Tab by mouth two (2) times daily (with meals). Indications: chronic heart failure, high blood pressure    prednisoLONE acetate (PRED FORTE) 1 % ophthalmic suspension Administer 1 Drop to both eyes four (4) times daily.  amLODIPine (NORVASC) 5 mg tablet Take 1 Tab by mouth daily.     fluticasone-vilanterol (BREO ELLIPTA) 200-25 mcg/dose inhaler Take 1 Puff by inhalation daily.  fluticasone (FLONASE) 50 mcg/actuation nasal spray Use as directed    aspirin delayed-release 81 mg tablet Take 81 mg by mouth daily.  lithium carbonate CR (ESKALITH CR) 450 mg CR tablet Take 450 mg by mouth two (2) times a day.  citalopram (CELEXA) 40 mg tablet Take 40 mg by mouth daily.  calcium-vitamin D (CALCIUM 500+D) 500 mg(1,250mg) -200 unit per tablet Take 1 Tab by mouth two (2) times daily (with meals).  furosemide (LASIX) 20 mg tablet Take 1 Tab by mouth two (2) times a day. No current facility-administered medications for this visit. SYSTEM REVIEW NOT RELATED TO LIVER DISEASE OR REVIEWED ABOVE:  Constitution systems: Negative for fever, chills, weight gain, weight loss. Eyes: Negative for visual changes. ENT: Negative for sore throat, painful swallowing. Respiratory: SOB. Negative for cough, hemoptysis. Cardiology: Negative for chest pain, palpitations. GI:  Negative for constipation or diarrhea. : Negative for urinary frequency, dysuria, hematuria, nocturia. Skin: Negative for rash. Hematology: Negative for easy bruising, blood clots. Musculo-skeletal: Negative for back pain, muscle pain, weakness. Neurologic: Negative for headaches, dizziness, vertigo, memory problems not related to HE. Psychology: Negative for anxiety, depression. FAMILY HISTORY:  The father is alive and healthy. The mother has hypertension. She is 92 or 93. There is no family history of liver disease. SOCIAL HISTORY:  The patient has never been . The patient has 2 children and 5 grandchildren. The patient stopped using tobacco products in 11/2017. The patient didn't drink daily but stopped drinking anything in 2017. The patient is currently receiving disability.       PHYSICAL EXAMINATION:  Visit Vitals  /72 (BP 1 Location: Left arm, BP Patient Position: Sitting)   Pulse 94   Temp 98.5 °F (36.9 °C) (Tympanic)   Ht 5' 10\" (1.778 m)   Wt 188 lb (85.3 kg)   SpO2 92%   BMI 26.98 kg/m²     General: No acute distress. Eyes: Sclera anicteric. ENT: No oral lesions. Nodes: No adenopathy. Skin: No spider angiomata. No jaundice. No palmar erythema. Respiratory: Lungs clear to auscultation. He has supplemental oxygen with him. Cardiovascular: Regular heart rate. No murmurs. No JVD. Abdomen: Soft non-tender. Liver size normal to percussion/palpation. Spleen not palpable. No obvious ascites. Extremities: No edema. No muscle wasting. No gross arthritic changes. Neurologic: Alert and oriented. Cranial nerves grossly intact. No asterixis.     LABORATORY STUDIES:  Liver [place] of [address] & Units 6/12/2019 5/28/2019   WBC 4.1 - 11.1 K/uL 8.0 5.5   ANC 1.8 - 8.0 K/UL 5.7 3.2   HGB 12.1 - 17.0 g/dL 14.6 18.0 (H)   HGB 12.1 - 17.0 GM/DL     HGB (iSTAT) 12.1 - 17.0 GM/DL      - 400 K/uL 153 211   INR 0.8 - 1.2  1.0   AST 15 - 37 U/L 52 (H) 31   ALT 12 - 78 U/L 67 22   Alk Phos 45 - 117 U/L 426 (H) 466 (H)   Bili, Total 0.2 - 1.0 MG/DL 0.7 1.1   Bili, Direct 0.00 - 0.40 mg/dL  0.32   Albumin 3.5 - 5.0 g/dL 3.0 (L) 4.2   BUN 6 - 20 MG/DL 13 12   BUN (iSTAT) 9 - 20 MG/DL     Creat 0.70 - 1.30 MG/DL 1.10 1.12   Creat (iSTAT) 0.6 - 1.3 MG/DL     Na 136 - 145 mmol/L 142 136   K 3.5 - 5.1 mmol/L 3.1 (L) 4.6   Cl 97 - 108 mmol/L 107 100   CO2 21 - 32 mmol/L 27 19 (L)   Glucose 65 - 100 mg/dL 97 84   Magnesium 1.6 - 2.4 mg/dL       Liver [place] of [address] [place] [address] Ref Rng & Units 1/18/2019 8/17/2018   WBC 4.1 - 11.1 K/uL 6.9 8.1   ANC 1.8 - 8.0 K/UL     HGB 12.1 - 17.0 g/dL 16.8 18.2 (H)   HGB 12.1 - 17.0 GM/DL     HGB (iSTAT) 12.1 - 17.0 GM/DL      - 400 K/uL 176 176   INR 0.8 - 1.2     AST 15 - 37 U/L 53 (H) 32   ALT 12 - 78 U/L 63 (H) 54 (H)   Alk Phos 45 - 117 U/L 458 (H) 431 (H)   Bili, Total 0.2 - 1.0 MG/DL 0.6 1.3 (H)   Bili, Direct 0.00 - 0.40 mg/dL  0.32   Albumin 3.5 - 5.0 g/dL 4.3 4. 3   BUN 6 - 20 MG/DL 12 8   BUN (iSTAT) 9 - 20 MG/DL     Creat 0.70 - 1.30 MG/DL 0.93 1.21   Creat (iSTAT) 0.6 - 1.3 MG/DL     Na 136 - 145 mmol/L 141 139   K 3.5 - 5.1 mmol/L 4.8 4.5   Cl 97 - 108 mmol/L 104 100   CO2 21 - 32 mmol/L 17 (L) 20   Glucose 65 - 100 mg/dL 88 92   Magnesium 1.6 - 2.4 mg/dL       SEROLOGIES:  Serologies Latest Ref Rng & Units 5/28/2019 1/23/2019   Hep A Ab, Total Negative  Negative   Hep B Surface Ag Negative  Negative   Hep B Surface AB QL   Non Reactive   Ferritin 30 - 400 ng/mL  116   Iron % Saturation 15 - 55 %  27   FELIX Ab, Direct Negative  Negative   C-ANCA Neg:<1:20 titer  <1:20   P-ANCA Neg:<1:20 titer  <1:20   ANCA Neg:<1:20 titer  <1:20   ASMCA 0 - 19 Units 9    M2 Ab 0.0 - 20.0 Units  <20.0   Ceruloplasmin 16.0 - 31.0 mg/dL  35.7 (H)   Alpha-1 antitrypsin level 90 - 200 mg/dL  142     Serologies Latest Ref Rng & Units 6/29/2018   Hep A Ab, Total Negative    Hep B Surface Ag Negative    Hep B Surface AB QL     Ferritin 30 - 400 ng/mL    Iron % Saturation 15 - 55 %    FELIX Ab, Direct Negative    C-ANCA Neg:<1:20 titer <1:20   P-ANCA Neg:<1:20 titer <1:20   ANCA Neg:<1:20 titer <1:20   ASMCA 0 - 19 Units    M2 Ab 0.0 - 20.0 Units 8.7   Ceruloplasmin 16.0 - 31.0 mg/dL    Alpha-1 antitrypsin level 90 - 200 mg/dL 127     LIVER HISTOLOGY:  6/2019. Liver biopsy. 6/2019. Slides reviewed by MLS. Hepatic granulomas consistent with sarcoidosis. No inflammation. No significant fibrosis. 8/2018. FibroScan performed at [address]. EkPa was 4.6. IQR/med 22%. The results suggested a fibrosis level of F0. CAP was 182, not consistent with fatty liver disease. ENDOSCOPIC PROCEDURES:  Not available or performed    RADIOLOGY:  8/2018. Abdominal ultrasound. There is no acute abnormality in the abdomen. Small left kidney parenchyma calcification or non obstructing calculus.      OTHER TESTING:  Not available or performed    FOLLOW-UP:  All of the issues listed above in the assessment and plan were discussed with the patient. All questions were answered.  The patient expressed a clear understanding of the above.     Follow-up Jairon Carey 32 in 6 months for monitoring and repeat FibroScan.      Manolo Officer, [facility]-BC  Liver [place] [facility], [address] [place] [address] Nithin Brown  22.  [phone]

## 2020-01-16 LAB
ALBUMIN SERPL-MCNC: 3.9 G/DL (ref 3.6–4.8)
ALP SERPL-CCNC: 228 IU/L (ref 39–117)
ALT SERPL-CCNC: 70 IU/L (ref 0–44)
AST SERPL-CCNC: 40 IU/L (ref 0–40)
BILIRUB DIRECT SERPL-MCNC: 0.17 MG/DL (ref 0–0.4)
BILIRUB SERPL-MCNC: 0.5 MG/DL (ref 0–1.2)
BUN SERPL-MCNC: 15 MG/DL (ref 8–27)
BUN/CREAT SERPL: 15 (ref 10–24)
CALCIUM SERPL-MCNC: 8.9 MG/DL (ref 8.6–10.2)
CHLORIDE SERPL-SCNC: 106 MMOL/L (ref 96–106)
CO2 SERPL-SCNC: 22 MMOL/L (ref 20–29)
CREAT SERPL-MCNC: 1.03 MG/DL (ref 0.76–1.27)
ERYTHROCYTE [DISTWIDTH] IN BLOOD BY AUTOMATED COUNT: 15.8 % (ref 11.6–15.4)
GLUCOSE SERPL-MCNC: 93 MG/DL (ref 65–99)
HCT VFR BLD AUTO: 48.6 % (ref 37.5–51)
HGB BLD-MCNC: 16.2 G/DL (ref 13–17.7)
INR PPP: 0.9 (ref 0.8–1.2)
MCH RBC QN AUTO: 29.8 PG (ref 26.6–33)
MCHC RBC AUTO-ENTMCNC: 33.3 G/DL (ref 31.5–35.7)
MCV RBC AUTO: 90 FL (ref 79–97)
NRBC BLD AUTO-RTO: 1 % (ref 0–0)
PLATELET # BLD AUTO: 138 X10E3/UL (ref 150–450)
POTASSIUM SERPL-SCNC: 3.8 MMOL/L (ref 3.5–5.2)
PROT SERPL-MCNC: 6.4 G/DL (ref 6–8.5)
PROTHROMBIN TIME: 9.9 SEC (ref 9.1–12)
RBC # BLD AUTO: 5.43 X10E6/UL (ref 4.14–5.8)
SODIUM SERPL-SCNC: 145 MMOL/L (ref 134–144)
WBC # BLD AUTO: 10.3 X10E3/UL (ref 3.4–10.8)

## 2020-02-24 ENCOUNTER — OFFICE VISIT (OUTPATIENT)
Dept: INTERNAL MEDICINE CLINIC | Age: 55
End: 2020-02-24

## 2020-02-24 VITALS
RESPIRATION RATE: 18 BRPM | WEIGHT: 185 LBS | TEMPERATURE: 97.8 F | HEIGHT: 70 IN | HEART RATE: 107 BPM | DIASTOLIC BLOOD PRESSURE: 104 MMHG | BODY MASS INDEX: 26.48 KG/M2 | OXYGEN SATURATION: 92 % | SYSTOLIC BLOOD PRESSURE: 150 MMHG

## 2020-02-24 DIAGNOSIS — I10 ESSENTIAL HYPERTENSION: Primary | ICD-10-CM

## 2020-02-24 DIAGNOSIS — M54.50 ACUTE MIDLINE LOW BACK PAIN WITHOUT SCIATICA: ICD-10-CM

## 2020-02-24 RX ORDER — TIZANIDINE 4 MG/1
4 TABLET ORAL
Qty: 30 TAB | Refills: 0 | Status: SHIPPED | OUTPATIENT
Start: 2020-02-24

## 2020-02-24 NOTE — PROGRESS NOTES
Chief Complaint   Patient presents with    Hypertension     Pt who presents for follow up of a pre-existing problem of hypertension. Diet and Lifestyle: not attempting to follow a low fat, low cholesterol diet, not attempting to follow a low sodium diet, sedentary, smoker daily  Home BP Monitoring: is not measured at home  Use of agents associated with hypertension: none. Cardiovascular ROS: taking medications as instructed, no medication side effects noted, no TIA's, no chest pain on exertion, no dyspnea on exertion, no swelling of ankles. New concerns: he is a 60y.o. year old male who presents for follow up of injury. Follow Up Pain Assessment Encounter      Onset of Symptoms: a couple days  ________________________________________________________________________  Description: Pain is now back after a fall     Pain Scale:(1-10): 10  Duration:  continuous  Radiation: none  What makes it better?: OTC meds and rest  What makes it worse?:exercise, sitting, sleep and strecthing  Medications tried: acetaminophen, ibuprofen  Modalities tried: PT   .     Reviewed and agree with Nurse Note and duplicated in this note. Reviewed PmHx, RxHx, FmHx, SocHx, AllgHx and updated and dated in the chart.     Family History   Problem Relation Age of Onset    Cancer Father         pancreas    Diabetes Father     Heart Disease Father     Hypertension Father     Stroke Father     Diabetes Mother     Hypertension Mother        Past Medical History:   Diagnosis Date    Asthma     Bipolar disorder ([facility] [address]     Bi-polar, Anxiety    Cataracts, bilateral     DDD (degenerative disc disease)     Fracture of left humerus     non operative    GERD (gastroesophageal reflux disease)     Glaucoma     bilateral    Heart failure (HCC)     Degenerated heart failure    Hypertension     Ill-defined condition 2000    DX WITH SARCODOSIS    Ill-defined condition     USES O2 AT 3;30AM TO 5 AM EVERDAY & AS NEEDED    Ill-defined condition 2016    HEP C    Other ill-defined conditions(799.89)     Glaucoma    Sarcoid     Sarcoidosis     lung       Social History     Socioeconomic History    Marital status: SINGLE     Spouse name: Not on file    Number of children: Not on file    Years of education: Not on file    Highest education level: Not on file   Tobacco Use    Smoking status: Former Smoker     Packs/day: 0.25     Years: 6.00     Pack years: 1.50     Last attempt to quit: 3/6/2018     Years since quittin.9    Smokeless tobacco: Never Used   Substance and Sexual Activity    Alcohol use: No     Alcohol/week: 0.0 standard drinks     Comment: quit 2 years or so ago    Drug use: No    Sexual activity: Yes     Partners: Female     Birth control/protection: Condom   Social History Narrative    ** Merged History Encounter **        Father,  Son getting  soon and he's assisting with the wedding. Review of Systems - negative except as listed above      Objective: There were no vitals filed for this visit.     Physical Examination: General appearance - alert, well appearing, and in no distress  Eyes - pupils equal and reactive, extraocular eye movements intact  Ears - bilateral TM's and external ear canals normal  Nose - normal and patent, no erythema, discharge or polyps  Mouth - mucous membranes moist, pharynx normal without lesions  Neck - supple, no significant adenopathy  Chest - clear to auscultation, no wheezes, rales or rhonchi, symmetric air entry  Heart - normal rate, regular rhythm, normal S1, S2, no murmurs, rubs, clicks or gallops  Abdomen - soft, nontender, nondistended, no masses or organomegaly  Back exam - full range of motion, no tenderness, palpable spasm or pain on motion  Musculoskeletal - no joint tenderness, deformity or swelling  Extremities - peripheral pulses normal, no pedal edema, no clubbing or cyanosis  Skin - normal coloration and turgor, no rashes, no suspicious skin lesions noted    Assessment/ Plan:   Diagnoses and all orders for this visit:    1. Essential hypertension    2. Acute midline low back pain without sciatica  -     XR SPINE LUMB 2 OR 3 V; Future  -     REFERRAL TO PHYSICAL THERAPY  -     tiZANidine (ZANAFLEX) 4 mg tablet; Take 1 Tab by mouth every six (6) hours as needed for Muscle Spasm(s). Medication Side Effects and Warnings were discussed with patient,  Patient Labs were reviewed and or requested, and  Patient Past Records were reviewed and or requested  yes       I have discussed the diagnosis with the patient and the intended plan as seen in the above orders. The patient has received an after-visit summary and questions were answered concerning future plans. Pt agrees to call or return to clinic and/or go to closest ER with any worsening of symptoms. This may include, but not limited to increased fever (>100.4) with NSAIDS or Tylenol, increased edema, confusion, rash, worsening of presenting symptoms.

## 2020-04-10 ENCOUNTER — DOCUMENTATION ONLY (OUTPATIENT)
Dept: HEMATOLOGY | Age: 55
End: 2020-04-10

## 2020-04-22 DIAGNOSIS — I10 ESSENTIAL HYPERTENSION: ICD-10-CM

## 2020-04-22 DIAGNOSIS — I10 ESSENTIAL HYPERTENSION WITH GOAL BLOOD PRESSURE LESS THAN 140/90: ICD-10-CM

## 2020-04-22 RX ORDER — CARVEDILOL 25 MG/1
25 TABLET ORAL 2 TIMES DAILY WITH MEALS
Qty: 180 TAB | Refills: 3 | Status: SHIPPED | OUTPATIENT
Start: 2020-04-22

## 2020-05-14 DIAGNOSIS — I10 ESSENTIAL HYPERTENSION: ICD-10-CM

## 2020-05-14 RX ORDER — LOSARTAN POTASSIUM 100 MG/1
100 TABLET ORAL DAILY
Qty: 90 TAB | Refills: 3 | Status: SHIPPED | OUTPATIENT
Start: 2020-05-14 | End: 2020-05-18 | Stop reason: SDUPTHER

## 2020-05-18 DIAGNOSIS — I10 ESSENTIAL HYPERTENSION: ICD-10-CM

## 2020-05-18 RX ORDER — LOSARTAN POTASSIUM 100 MG/1
100 TABLET ORAL DAILY
Qty: 90 TAB | Refills: 3 | Status: SHIPPED | OUTPATIENT
Start: 2020-05-18

## 2020-06-08 ENCOUNTER — OFFICE VISIT (OUTPATIENT)
Dept: INTERNAL MEDICINE CLINIC | Age: 55
End: 2020-06-08

## 2020-06-08 VITALS
RESPIRATION RATE: 20 BRPM | OXYGEN SATURATION: 94 % | HEIGHT: 70 IN | BODY MASS INDEX: 27.29 KG/M2 | SYSTOLIC BLOOD PRESSURE: 165 MMHG | DIASTOLIC BLOOD PRESSURE: 113 MMHG | WEIGHT: 190.6 LBS | TEMPERATURE: 97.9 F | HEART RATE: 88 BPM

## 2020-06-08 DIAGNOSIS — I10 ESSENTIAL HYPERTENSION: Primary | ICD-10-CM

## 2020-06-08 DIAGNOSIS — Z91.09 ENVIRONMENTAL ALLERGIES: ICD-10-CM

## 2020-06-08 RX ORDER — FLUTICASONE PROPIONATE 50 MCG
2 SPRAY, SUSPENSION (ML) NASAL DAILY
Qty: 1 BOTTLE | Refills: 1 | Status: SHIPPED | OUTPATIENT
Start: 2020-06-08 | End: 2020-09-10

## 2020-06-08 NOTE — PATIENT INSTRUCTIONS
DASH Diet: Care Instructions  Your Care Instructions     The DASH diet is an eating plan that can help lower your blood pressure. DASH stands for Dietary Approaches to Stop Hypertension. Hypertension is high blood pressure. The DASH diet focuses on eating foods that are high in calcium, potassium, and magnesium. These nutrients can lower blood pressure. The foods that are highest in these nutrients are fruits, vegetables, low-fat dairy products, nuts, seeds, and legumes. But taking calcium, potassium, and magnesium supplements instead of eating foods that are high in those nutrients does not have the same effect. The DASH diet also includes whole grains, fish, and poultry. The DASH diet is one of several lifestyle changes your doctor may recommend to lower your high blood pressure. Your doctor may also want you to decrease the amount of sodium in your diet. Lowering sodium while following the DASH diet can lower blood pressure even further than just the DASH diet alone. Follow-up care is a key part of your treatment and safety. Be sure to make and go to all appointments, and call your doctor if you are having problems. It's also a good idea to know your test results and keep a list of the medicines you take. How can you care for yourself at home? Following the DASH diet  · Eat 4 to 5 servings of fruit each day. A serving is 1 medium-sized piece of fruit, ½ cup chopped or canned fruit, 1/4 cup dried fruit, or 4 ounces (½ cup) of fruit juice. Choose fruit more often than fruit juice. · Eat 4 to 5 servings of vegetables each day. A serving is 1 cup of lettuce or raw leafy vegetables, ½ cup of chopped or cooked vegetables, or 4 ounces (½ cup) of vegetable juice. Choose vegetables more often than vegetable juice. · Get 2 to 3 servings of low-fat and fat-free dairy each day. A serving is 8 ounces of milk, 1 cup of yogurt, or 1 ½ ounces of cheese. · Eat 6 to 8 servings of grains each day.  A serving is 1 slice of bread, 1 ounce of dry cereal, or ½ cup of cooked rice, pasta, or cooked cereal. Try to choose whole-grain products as much as possible. · Limit lean meat, poultry, and fish to 2 servings each day. A serving is 3 ounces, about the size of a deck of cards. · Eat 4 to 5 servings of nuts, seeds, and legumes (cooked dried beans, lentils, and split peas) each week. A serving is 1/3 cup of nuts, 2 tablespoons of seeds, or ½ cup of cooked beans or peas. · Limit fats and oils to 2 to 3 servings each day. A serving is 1 teaspoon of vegetable oil or 2 tablespoons of salad dressing. · Limit sweets and added sugars to 5 servings or less a week. A serving is 1 tablespoon jelly or jam, ½ cup sorbet, or 1 cup of lemonade. · Eat less than 2,300 milligrams (mg) of sodium a day. If you limit your sodium to 1,500 mg a day, you can lower your blood pressure even more. Tips for success  · Start small. Do not try to make dramatic changes to your diet all at once. You might feel that you are missing out on your favorite foods and then be more likely to not follow the plan. Make small changes, and stick with them. Once those changes become habit, add a few more changes. · Try some of the following:  ? Make it a goal to eat a fruit or vegetable at every meal and at snacks. This will make it easy to get the recommended amount of fruits and vegetables each day. ? Try yogurt topped with fruit and nuts for a snack or healthy dessert. ? Add lettuce, tomato, cucumber, and onion to sandwiches. ? Combine a ready-made pizza crust with low-fat mozzarella cheese and lots of vegetable toppings. Try using tomatoes, squash, spinach, broccoli, carrots, cauliflower, and onions. ? Have a variety of cut-up vegetables with a low-fat dip as an appetizer instead of chips and dip. ? Sprinkle sunflower seeds or chopped almonds over salads. Or try adding chopped walnuts or almonds to cooked vegetables.   ? Try some vegetarian meals using beans and peas. Add garbanzo or kidney beans to salads. Make burritos and tacos with mashed hand beans or black beans. Where can you learn more? Go to http://marylin-chan.info/  Enter H967 in the search box to learn more about \"DASH Diet: Care Instructions. \"  Current as of: December 16, 2019               Content Version: 12.5  © 9424-9396 [employer]. Care instructions adapted under license by [employer] (which disclaims liability or warranty for this information). If you have questions about a medical condition or this instruction, always ask your healthcare professional. [address] any warranty or liability for your use of this information.

## 2020-06-08 NOTE — PROGRESS NOTES
Chief Complaint   Patient presents with    Hypertension     Pt who presents for follow up of a pre-existing problem of hypertension. Diet and Lifestyle: generally follows a low fat low cholesterol diet  Home BP Monitoring: is well controlled at home, ranging 120's/80's  Patient states that he got an argument respiratory get came in and asked why his blood pressure was high today. Use of agents associated with hypertension: none. Cardiovascular ROS: taking medications as instructed, no medication side effects noted, no TIA's, no chest pain on exertion, no dyspnea on exertion, no swelling of ankles. New concerns: .     Reviewed and agree with Nurse Note and duplicated in this note. Reviewed PmHx, RxHx, FmHx, SocHx, AllgHx and updated and dated in the chart.     Family History   Problem Relation Age of Onset    Cancer Father         pancreas    Diabetes Father     Heart Disease Father     Hypertension Father     Stroke Father     Diabetes Mother     Hypertension Mother        Past Medical History:   Diagnosis Date    Asthma     Bipolar disorder ([facility] [address]     Bi-polar, Anxiety    Cataracts, bilateral     DDD (degenerative disc disease)     Fracture of left humerus     non operative    GERD (gastroesophageal reflux disease)     Glaucoma     bilateral    Heart failure ([facility] [address]     Degenerated heart failure    Hypertension     Ill-defined condition 2000    DX WITH SARCODOSIS    Ill-defined condition     USES O2 AT 3;30AM TO 5 AM EVERDAY & AS NEEDED    Ill-defined condition 2016    HEP C    Other ill-defined conditions(799.89)     Glaucoma    Sarcoid     Sarcoidosis     lung       Social History     Socioeconomic History    Marital status: SINGLE     Spouse name: Not on file    Number of children: Not on file    Years of education: Not on file    Highest education level: Not on file   Tobacco Use    Smoking status: Former Smoker     Packs/day: 0.25     Years: 6.00     Pack years: 1.50     Last attempt to quit: 3/6/2018     Years since quittin.2    Smokeless tobacco: Never Used   Substance and Sexual Activity    Alcohol use: No     Alcohol/week: 0.0 standard drinks     Comment: quit 2 years or so ago    Drug use: No    Sexual activity: Yes     Partners: Female     Birth control/protection: Condom   Social History Narrative    ** Merged History Encounter **        Father,  Son getting  soon and he's assisting with the wedding. Review of Systems - negative except as listed above      Objective:     Vitals:    20 0928   BP: (!) 165/113   Pulse: 88   Resp: 20   Temp: 97.9 °F (36.6 °C)   TempSrc: Oral   SpO2: 94%   Weight: 190 lb 9.6 oz (86.5 kg)   Height: 5' 10\" (1.778 m)       Physical Examination: General appearance - alert, well appearing, and in no distress  Eyes - pupils equal and reactive, extraocular eye movements intact  Ears - bilateral TM's and external ear canals normal  Nose - normal and patent, no erythema, discharge or polyps  Mouth - mucous membranes moist, pharynx normal without lesions  Neck - supple, no significant adenopathy  Chest - clear to auscultation, no wheezes, rales or rhonchi, symmetric air entry  Heart - normal rate, regular rhythm, normal S1, S2, no murmurs, rubs, clicks or gallops  Abdomen - soft, nontender, nondistended, no masses or organomegaly  Extremities - peripheral pulses normal, no pedal edema, no clubbing or cyanosis  Skin - normal coloration and turgor, no rashes, no suspicious skin lesions noted     Assessment/ Plan:   Diagnoses and all orders for this visit:    1. Essential hypertension  Patient return to clinic in 2 weeks for blood pressure check with nurse  Patient will also call him with home blood pressures  2. Environmental allergies    Other orders  -     fluticasone propionate (FLONASE) 50 mcg/actuation nasal spray; 2 Sprays by Both Nostrils route daily.               Medication Side Effects and Warnings were discussed with patient,  Patient Labs were reviewed and or requested, and  Patient Past Records were reviewed and or requested  yes       I have discussed the diagnosis with the patient and the intended plan as seen in the above orders. The patient has received an after-visit summary and questions were answered concerning future plans. Pt agrees to call or return to clinic and/or go to closest ER with any worsening of symptoms. This may include, but not limited to increased fever (>100.4) with NSAIDS or Tylenol, increased edema, confusion, rash, worsening of presenting symptoms. Please note that this dictation was completed with [employer], the [employer] voice recognition software. Quite often unanticipated grammatical, syntax, homophones, and other interpretive errors are inadvertently transcribed by the computer software. Please disregard these errors. Please excuse any errors that have escaped final proofreading. Thank you.

## 2020-09-10 RX ORDER — FLUTICASONE PROPIONATE 50 MCG
SPRAY, SUSPENSION (ML) NASAL
Qty: 16 G | Refills: 1 | Status: SHIPPED | OUTPATIENT
Start: 2020-09-10 | End: 2020-11-30

## 2020-11-30 RX ORDER — FLUTICASONE PROPIONATE 50 MCG
SPRAY, SUSPENSION (ML) NASAL
Qty: 16 G | Refills: 1 | Status: SHIPPED | OUTPATIENT
Start: 2020-11-30 | End: 2021-01-28

## 2021-01-28 RX ORDER — FLUTICASONE PROPIONATE 50 MCG
SPRAY, SUSPENSION (ML) NASAL
Qty: 1 BOTTLE | Refills: 1 | Status: SHIPPED | OUTPATIENT
Start: 2021-01-28 | End: 2021-05-24

## 2021-05-24 RX ORDER — FLUTICASONE PROPIONATE 50 MCG
SPRAY, SUSPENSION (ML) NASAL
Qty: 1 BOTTLE | Refills: 1 | Status: SHIPPED | OUTPATIENT
Start: 2021-05-24

## 2021-09-10 ENCOUNTER — HOSPITAL ENCOUNTER (EMERGENCY)
Age: 56
Discharge: HOME | End: 2021-09-10
Attending: EMERGENCY MEDICINE
Payer: MEDICAID

## 2021-09-10 VITALS
SYSTOLIC BLOOD PRESSURE: 137 MMHG | DIASTOLIC BLOOD PRESSURE: 107 MMHG | HEIGHT: 68 IN | OXYGEN SATURATION: 96 % | BODY MASS INDEX: 26.37 KG/M2 | TEMPERATURE: 98.7 F | WEIGHT: 174 LBS | RESPIRATION RATE: 18 BRPM | HEART RATE: 94 BPM

## 2021-09-10 DIAGNOSIS — L72.0 EPIDERMOID CYST OF SKIN: ICD-10-CM

## 2021-09-10 DIAGNOSIS — J02.9 ACUTE PHARYNGITIS, UNSPECIFIED ETIOLOGY: Primary | ICD-10-CM

## 2021-09-10 LAB — DEPRECATED S PYO AG THROAT QL EIA: NEGATIVE

## 2021-09-10 PROCEDURE — 87070 CULTURE OTHR SPECIMN AEROBIC: CPT

## 2021-09-10 PROCEDURE — 87880 STREP A ASSAY W/OPTIC: CPT

## 2021-09-10 PROCEDURE — 99283 EMERGENCY DEPT VISIT LOW MDM: CPT

## 2021-09-10 RX ORDER — AZITHROMYCIN 500 MG/1
500 TABLET, FILM COATED ORAL DAILY
Qty: 5 TABLET | Refills: 0 | Status: SHIPPED | OUTPATIENT
Start: 2021-09-10 | End: 2021-09-15

## 2021-09-10 RX ORDER — LIDOCAINE HYDROCHLORIDE 20 MG/ML
15 SOLUTION OROPHARYNGEAL
Qty: 100 ML | Refills: 0 | Status: SHIPPED | OUTPATIENT
Start: 2021-09-10

## 2021-09-10 NOTE — ED TRIAGE NOTES
Reports being bit or stung by insect on Tuesday and has a lump midline upper back. States it drained a little pus last night and he removed a stinger from site. Also reports sore throat starting yesterday morning.
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
## 2021-09-12 LAB
BACTERIA SPEC CULT: NORMAL
SERVICE CMNT-IMP: NORMAL

## 2022-08-19 ENCOUNTER — HOSPITAL ENCOUNTER (EMERGENCY)
Age: 57
Discharge: HOME | End: 2022-08-19
Attending: EMERGENCY MEDICINE
Payer: MEDICAID

## 2022-08-19 ENCOUNTER — APPOINTMENT (OUTPATIENT)
Dept: GENERAL RADIOLOGY | Age: 57
End: 2022-08-19
Attending: EMERGENCY MEDICINE
Payer: MEDICAID

## 2022-08-19 ENCOUNTER — APPOINTMENT (OUTPATIENT)
Dept: CT IMAGING | Age: 57
End: 2022-08-19
Attending: EMERGENCY MEDICINE
Payer: MEDICAID

## 2022-08-19 VITALS
DIASTOLIC BLOOD PRESSURE: 87 MMHG | HEART RATE: 98 BPM | RESPIRATION RATE: 16 BRPM | TEMPERATURE: 98.6 F | SYSTOLIC BLOOD PRESSURE: 116 MMHG | OXYGEN SATURATION: 91 %

## 2022-08-19 DIAGNOSIS — J18.9 PNEUMONIA OF LEFT LOWER LOBE DUE TO INFECTIOUS ORGANISM: Primary | ICD-10-CM

## 2022-08-19 LAB
ALBUMIN SERPL-MCNC: 3.4 G/DL (ref 3.5–5)
ALBUMIN/GLOB SERPL: 0.7 {RATIO} (ref 1.1–2.2)
ALP SERPL-CCNC: 438 U/L (ref 45–117)
ALT SERPL-CCNC: 120 U/L (ref 12–78)
ANION GAP SERPL CALC-SCNC: 4 MMOL/L (ref 5–15)
APPEARANCE UR: CLEAR
AST SERPL-CCNC: 55 U/L (ref 15–37)
BACTERIA URNS QL MICRO: NEGATIVE /HPF
BASOPHILS # BLD: 0.1 K/UL (ref 0–0.1)
BASOPHILS NFR BLD: 0 % (ref 0–1)
BILIRUB SERPL-MCNC: 1 MG/DL (ref 0.2–1)
BILIRUB UR QL: NEGATIVE
BUN SERPL-MCNC: 9 MG/DL (ref 6–20)
BUN/CREAT SERPL: 7 (ref 12–20)
CALCIUM SERPL-MCNC: 9.3 MG/DL (ref 8.5–10.1)
CHLORIDE SERPL-SCNC: 101 MMOL/L (ref 97–108)
CO2 SERPL-SCNC: 30 MMOL/L (ref 21–32)
COLOR UR: ABNORMAL
COMMENT, HOLDF: NORMAL
CREAT SERPL-MCNC: 1.37 MG/DL (ref 0.7–1.3)
DIFFERENTIAL METHOD BLD: ABNORMAL
EOSINOPHIL # BLD: 0.1 K/UL (ref 0–0.4)
EOSINOPHIL NFR BLD: 0 % (ref 0–7)
EPITH CASTS URNS QL MICRO: ABNORMAL /LPF
ERYTHROCYTE [DISTWIDTH] IN BLOOD BY AUTOMATED COUNT: 17 % (ref 11.5–14.5)
GLOBULIN SER CALC-MCNC: 5 G/DL (ref 2–4)
GLUCOSE SERPL-MCNC: 99 MG/DL (ref 65–100)
GLUCOSE UR STRIP.AUTO-MCNC: NEGATIVE MG/DL
HCT VFR BLD AUTO: 49.5 % (ref 36.6–50.3)
HGB BLD-MCNC: 15.9 G/DL (ref 12.1–17)
HGB UR QL STRIP: NEGATIVE
HYALINE CASTS URNS QL MICRO: ABNORMAL /LPF (ref 0–5)
IMM GRANULOCYTES # BLD AUTO: 0.1 K/UL (ref 0–0.04)
IMM GRANULOCYTES NFR BLD AUTO: 1 % (ref 0–0.5)
KETONES UR QL STRIP.AUTO: ABNORMAL MG/DL
LEUKOCYTE ESTERASE UR QL STRIP.AUTO: NEGATIVE
LYMPHOCYTES # BLD: 2.9 K/UL (ref 0.8–3.5)
LYMPHOCYTES NFR BLD: 16 % (ref 12–49)
MCH RBC QN AUTO: 27.1 PG (ref 26–34)
MCHC RBC AUTO-ENTMCNC: 32.1 G/DL (ref 30–36.5)
MCV RBC AUTO: 84.3 FL (ref 80–99)
MONOCYTES # BLD: 1.8 K/UL (ref 0–1)
MONOCYTES NFR BLD: 10 % (ref 5–13)
NEUTS SEG # BLD: 13.3 K/UL (ref 1.8–8)
NEUTS SEG NFR BLD: 73 % (ref 32–75)
NITRITE UR QL STRIP.AUTO: NEGATIVE
NRBC # BLD: 0 K/UL (ref 0–0.01)
NRBC BLD-RTO: 0 PER 100 WBC
PH UR STRIP: 6 [PH] (ref 5–8)
PLATELET # BLD AUTO: 172 K/UL (ref 150–400)
PMV BLD AUTO: 10.5 FL (ref 8.9–12.9)
POTASSIUM SERPL-SCNC: 3.8 MMOL/L (ref 3.5–5.1)
PROT SERPL-MCNC: 8.4 G/DL (ref 6.4–8.2)
PROT UR STRIP-MCNC: NEGATIVE MG/DL
RBC # BLD AUTO: 5.87 M/UL (ref 4.1–5.7)
RBC #/AREA URNS HPF: ABNORMAL /HPF (ref 0–5)
SAMPLES BEING HELD,HOLD: NORMAL
SODIUM SERPL-SCNC: 135 MMOL/L (ref 136–145)
SP GR UR REFRACTOMETRY: 1.02 (ref 1–1.03)
UR CULT HOLD, URHOLD: NORMAL
UROBILINOGEN UR QL STRIP.AUTO: 1 EU/DL (ref 0.2–1)
WBC # BLD AUTO: 18.2 K/UL (ref 4.1–11.1)
WBC URNS QL MICRO: ABNORMAL /HPF (ref 0–4)

## 2022-08-19 PROCEDURE — 96374 THER/PROPH/DIAG INJ IV PUSH: CPT

## 2022-08-19 PROCEDURE — 74011250637 HC RX REV CODE- 250/637: Performed by: EMERGENCY MEDICINE

## 2022-08-19 PROCEDURE — 36415 COLL VENOUS BLD VENIPUNCTURE: CPT

## 2022-08-19 PROCEDURE — 85025 COMPLETE CBC W/AUTO DIFF WBC: CPT

## 2022-08-19 PROCEDURE — 74176 CT ABD & PELVIS W/O CONTRAST: CPT

## 2022-08-19 PROCEDURE — 93005 ELECTROCARDIOGRAM TRACING: CPT

## 2022-08-19 PROCEDURE — 99285 EMERGENCY DEPT VISIT HI MDM: CPT

## 2022-08-19 PROCEDURE — 71046 X-RAY EXAM CHEST 2 VIEWS: CPT

## 2022-08-19 PROCEDURE — 80053 COMPREHEN METABOLIC PANEL: CPT

## 2022-08-19 PROCEDURE — 74011250636 HC RX REV CODE- 250/636: Performed by: EMERGENCY MEDICINE

## 2022-08-19 PROCEDURE — 81001 URINALYSIS AUTO W/SCOPE: CPT

## 2022-08-19 RX ORDER — LEVOFLOXACIN 750 MG/1
750 TABLET ORAL DAILY
Qty: 5 TABLET | Refills: 0 | Status: SHIPPED | OUTPATIENT
Start: 2022-08-20 | End: 2022-08-25

## 2022-08-19 RX ORDER — HYDROMORPHONE HYDROCHLORIDE 1 MG/ML
0.5 INJECTION, SOLUTION INTRAMUSCULAR; INTRAVENOUS; SUBCUTANEOUS ONCE
Status: COMPLETED | OUTPATIENT
Start: 2022-08-19 | End: 2022-08-19

## 2022-08-19 RX ADMIN — LEVOFLOXACIN 750 MG: 500 TABLET, FILM COATED ORAL at 16:50

## 2022-08-19 RX ADMIN — HYDROMORPHONE HYDROCHLORIDE 0.5 MG: 1 INJECTION, SOLUTION INTRAMUSCULAR; INTRAVENOUS; SUBCUTANEOUS at 14:57

## 2022-08-19 NOTE — ED NOTES
MD reviewed discharge paperwork with patient. Patient states no further questions at this time. IV removed and patient transported home with case management assistance.

## 2022-08-19 NOTE — ED NOTES
RN reached out to Case Management to assist with pt's transportation home as pt is on O2 currently with increased needs from his baseline (MD aware), concerns his portable tank may not be enough to get him home.

## 2022-08-19 NOTE — ED TRIAGE NOTES
TRIAGE NOTE:  Patient arrives by EMS with c/o left flank pain. Patient has history of cyst on kidney. Denies difficulty urinating. Patient reports nasty taste in his mouth. \"Like a bunch of pennies\".
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
## 2022-08-19 NOTE — PROGRESS NOTES
8/19/2022; 1645 -   RADHA received call from Nursing indicating that patient is in need of discharge transport. Patient has O2 Tank at bedside, but it does not have enough O2 to get the patient home. CM Management approved [facility] transport, if they can transport the patient's tank.   CM contacted [facility]; ETA [phone]    Estimated cost: $104.05    CRM: Forest Portillo MPH, CHES  Z: [phone] or [phone]

## 2022-08-20 LAB
ATRIAL RATE: 78 BPM
CALCULATED P AXIS, ECG09: 50 DEGREES
CALCULATED R AXIS, ECG10: 84 DEGREES
CALCULATED T AXIS, ECG11: 60 DEGREES
DIAGNOSIS, 93000: NORMAL
P-R INTERVAL, ECG05: 118 MS
Q-T INTERVAL, ECG07: 406 MS
QRS DURATION, ECG06: 94 MS
QTC CALCULATION (BEZET), ECG08: 462 MS
VENTRICULAR RATE, ECG03: 78 BPM

## (undated) DEVICE — Z DISCONTINUED NO SUB IDED SET EXTN W/ 4 W STPCOCK M SPIN LOK 36IN

## (undated) DEVICE — CATH IV AUTOGRD BC BLU 22GA 25 -- INSYTE

## (undated) DEVICE — Device: Brand: MEDICAL ACTION INDUSTRIES

## (undated) DEVICE — CONTAINER SPEC 20 ML LID NEUT BUFF FORMALIN 10 % POLYPR STS

## (undated) DEVICE — MAX-CORE® DISPOSABLE CORE BIOPSY INSTRUMENT, 16G X 16CM: Brand: MAX-CORE

## (undated) DEVICE — KENDALL RADIOLUCENT FOAM MONITORING ELECTRODE -RECTANGULAR SHAPE: Brand: KENDALL

## (undated) DEVICE — BAG BELONG PT PERS CLEAR HANDL

## (undated) DEVICE — SYRINGE MED 20ML STD CLR PLAS LUERLOCK TIP N CTRL DISP

## (undated) DEVICE — SET ADMIN 16ML TBNG L100IN 2 Y INJ SITE IV PIGGY BK DISP

## (undated) DEVICE — NEONATAL-ADULT SPO2 SENSOR: Brand: NELLCOR

## (undated) DEVICE — TRAY BX SFT TISS W/ RUL ALC PREP PD FEN DRP TWL LUERLOCK